# Patient Record
Sex: MALE | Race: WHITE | NOT HISPANIC OR LATINO | Employment: FULL TIME | URBAN - METROPOLITAN AREA
[De-identification: names, ages, dates, MRNs, and addresses within clinical notes are randomized per-mention and may not be internally consistent; named-entity substitution may affect disease eponyms.]

---

## 2018-08-22 ENCOUNTER — OFFICE VISIT (OUTPATIENT)
Dept: CARDIOLOGY | Facility: CLINIC | Age: 63
End: 2018-08-22
Attending: INTERNAL MEDICINE
Payer: COMMERCIAL

## 2018-08-22 VITALS
HEART RATE: 69 BPM | HEIGHT: 73 IN | SYSTOLIC BLOOD PRESSURE: 135 MMHG | DIASTOLIC BLOOD PRESSURE: 79 MMHG | WEIGHT: 212 LBS | BODY MASS INDEX: 28.1 KG/M2

## 2018-08-22 DIAGNOSIS — I25.10 CORONARY ARTERY DISEASE INVOLVING NATIVE CORONARY ARTERY OF NATIVE HEART WITHOUT ANGINA PECTORIS: ICD-10-CM

## 2018-08-22 DIAGNOSIS — M54.50 CHRONIC MIDLINE LOW BACK PAIN WITHOUT SCIATICA: ICD-10-CM

## 2018-08-22 DIAGNOSIS — G89.29 CHRONIC MIDLINE LOW BACK PAIN WITHOUT SCIATICA: ICD-10-CM

## 2018-08-22 DIAGNOSIS — Z87.898 HISTORY OF CHEST PAIN: ICD-10-CM

## 2018-08-22 DIAGNOSIS — I44.7 LEFT BUNDLE BRANCH BLOCK: ICD-10-CM

## 2018-08-22 DIAGNOSIS — E78.00 HYPERCHOLESTEROLEMIA: ICD-10-CM

## 2018-08-22 DIAGNOSIS — Z95.1 HISTORY OF CORONARY ARTERY BYPASS SURGERY: ICD-10-CM

## 2018-08-22 DIAGNOSIS — E66.3 OVERWEIGHT: ICD-10-CM

## 2018-08-22 DIAGNOSIS — I25.10 CORONARY ARTERY DISEASE DUE TO LIPID RICH PLAQUE: ICD-10-CM

## 2018-08-22 DIAGNOSIS — I25.83 CORONARY ARTERY DISEASE DUE TO LIPID RICH PLAQUE: ICD-10-CM

## 2018-08-22 PROCEDURE — 99214 OFFICE O/P EST MOD 30 MIN: CPT | Mod: 25,S$GLB,, | Performed by: INTERNAL MEDICINE

## 2018-08-22 PROCEDURE — 93000 ELECTROCARDIOGRAM COMPLETE: CPT | Mod: S$GLB,,, | Performed by: INTERNAL MEDICINE

## 2018-08-22 PROCEDURE — 3008F BODY MASS INDEX DOCD: CPT | Mod: CPTII,S$GLB,, | Performed by: INTERNAL MEDICINE

## 2018-08-22 RX ORDER — ASPIRIN 81 MG/1
81 TABLET ORAL DAILY
Qty: 90 TABLET | Refills: 3 | COMMUNITY
Start: 2018-08-22 | End: 2019-08-15 | Stop reason: SDUPTHER

## 2018-08-22 NOTE — PROGRESS NOTES
Subjective:     Richie Johnson is a 63 y.o. male with hypercholesterolemia. He is overweight. He has severe coronary artery disease and underwent coronary artery bypass grafting on 3/4/2013 receiving five grafts. He had some chest discomfort in 2016 and then had epigastric pain 6/2018. No exertional chest pain and no pain pain playing tennis. He may have had myalgia on statin in the past but appears to tolerate rosuvastatin well now. No exertional chest pain or exertional dyspnea. No palpitations or weak spells. Feeling well overall.      Coronary Artery Disease   Presents for follow-up visit. The disease course has been stable. Symptoms include chest pain. Pertinent negatives include no chest pressure, chest tightness, dizziness, leg swelling, muscle weakness, palpitations, shortness of breath or weight gain. Risk factors include hyperlipidemia. Risk factors do not include decreased physical activity, diabetes, hypertension or obesity. The symptoms have been stable.   Chest Pain    This is a recurrent problem. The current episode started more than 1 month ago. The problem has been resolved. The pain is at a severity of 2/10. The quality of the pain is described as burning. The pain radiates to the epigastrium. Associated symptoms include back pain. Pertinent negatives include no abdominal pain, claudication, cough, diaphoresis, dizziness, exertional chest pressure, fever, headaches, hemoptysis, irregular heartbeat, leg pain, lower extremity edema, malaise/fatigue, nausea, near-syncope, numbness, orthopnea, palpitations, PND, shortness of breath, sputum production, syncope, vomiting or weakness.   His past medical history is significant for CAD and hyperlipidemia.   Pertinent negatives for past medical history include no diabetes, no hypertension and no muscle weakness.   Hyperlipidemia   This is a chronic problem. The current episode started more than 1 year ago. The problem is controlled. Recent lipid tests were  reviewed and are normal. He has no history of chronic renal disease, diabetes, hypothyroidism, liver disease, obesity or nephrotic syndrome. Associated symptoms include chest pain. Pertinent negatives include no focal sensory loss, focal weakness, leg pain, myalgias or shortness of breath.       Review of Systems   Constitution: Negative for chills, diaphoresis, fever, weakness, malaise/fatigue and weight gain.   HENT: Negative for nosebleeds.    Eyes: Negative for blurred vision, double vision, pain, vision loss in left eye and vision loss in right eye.   Cardiovascular: Positive for chest pain. Negative for claudication, dyspnea on exertion, irregular heartbeat, leg swelling, near-syncope, orthopnea, palpitations, paroxysmal nocturnal dyspnea and syncope.   Respiratory: Negative for chest tightness, cough, hemoptysis, shortness of breath, sputum production and wheezing.    Endocrine: Negative for cold intolerance and heat intolerance.   Hematologic/Lymphatic: Negative for bleeding problem. Does not bruise/bleed easily.   Skin: Negative for color change, itching and rash.   Musculoskeletal: Positive for back pain. Negative for falls, muscle weakness and myalgias.   Gastrointestinal: Negative for abdominal pain, flatus, heartburn, hematemesis, hematochezia, hemorrhoids, jaundice, melena, nausea and vomiting.   Genitourinary: Negative for dysuria and hematuria.   Neurological: Negative for dizziness, focal weakness, headaches, light-headedness, loss of balance, numbness and vertigo.   Psychiatric/Behavioral: Negative for altered mental status, depression and memory loss. The patient is not nervous/anxious.    Allergic/Immunologic: Negative for hives and persistent infections.       Current Outpatient Medications on File Prior to Visit   Medication Sig Dispense Refill    aspirin (ECOTRIN) 81 MG EC tablet TAKE 1 TABLET BY MOUTH EVERY DAY 30 tablet 5    betamethasone dipropionate (DIPROLENE) 0.05 % cream as needed.    "3    CALCIUM CARBONATE/VITAMIN D3 (VITAMIN D-3 ORAL) Take 2,000 Int'l Units by mouth once daily.      CRESTOR 40 mg Tab Take 40 mg by mouth once daily.  6    hydrocodone-acetaminophen 5-325mg (NORCO) 5-325 mg per tablet Take 1 tablet by mouth every 6 (six) hours as needed for Pain. 40 tablet 0    ibuprofen (ADVIL,MOTRIN) 800 MG tablet Take 800 mg by mouth every 8 (eight) hours as needed.  0    metoprolol succinate (TOPROL-XL) 50 MG 24 hr tablet TAKE 1 TABLET BY MOUTH EVERY DAY 30 tablet 5    UBIDECARENONE (COQ-10 ORAL) Take 200 mg by mouth once daily.      ZETIA 10 mg tablet Take 10 mg by mouth once daily.  6     No current facility-administered medications on file prior to visit.        /79   Pulse 69   Ht 6' 1" (1.854 m)   Wt 96.2 kg (212 lb)   BMI 27.97 kg/m²       Objective:     Physical Exam   Constitutional: He is oriented to person, place, and time. He appears well-developed and well-nourished. No distress.   HENT:   Head: Normocephalic and atraumatic.   Eyes: Conjunctivae are normal. Right eye exhibits no discharge. Left eye exhibits no discharge. No scleral icterus.   Neck: Neck supple. No JVD present. Carotid bruit is not present. No thyromegaly present.   Cardiovascular: Normal rate, regular rhythm, S1 normal and S2 normal. Exam reveals gallop and S4. Exam reveals no S3.   No murmur heard.  Pulses:       Radial pulses are 2+ on the right side, and 2+ on the left side.        Dorsalis pedis pulses are 2+ on the right side, and 2+ on the left side.        Posterior tibial pulses are 2+ on the right side, and 2+ on the left side.   Pulmonary/Chest: Effort normal and breath sounds normal.   Abdominal: Soft. Normal appearance. There is no tenderness.   Musculoskeletal:        Right ankle: He exhibits no swelling, no ecchymosis and no deformity.        Left ankle: He exhibits no swelling, no ecchymosis and no deformity.   Lymphadenopathy:        Head (right side): No submandibular adenopathy " present.        Head (left side): No submandibular adenopathy present.     He has no cervical adenopathy.   Neurological: He is alert and oriented to person, place, and time.   Skin: Skin is warm, dry and intact. No rash noted. He is not diaphoretic.   Psychiatric: He has a normal mood and affect. His speech is normal and behavior is normal. Judgment and thought content normal. Cognition and memory are normal.       Assessment:     1. Coronary artery disease involving native coronary artery of native heart without angina pectoris    2. History of coronary artery bypass surgery    3. History of chest pain    4. Left bundle branch block    5. Hypercholesterolemia    6. Overweight    7. Chronic midline low back pain without sciatica        Plan:      1. Coronary Artery Disease   2/28/2013: [facility]: Cath: 3V CAD.    3/4/2013: [facility]: CABG x 5: LIMA to LAD, SVG1 to D1, SVG2 to RI & OM1 and SVG3 to RCA.    On aspirin 81 mg Q24.   Appears stable.    2. History of Chest Pain   8/15/2016: Experienced chest pain.   8/30/2016: ECG: New LBBB.   10/20/2016: Echo: Normal left ventricular size and systolic function. Mild LVH. Moderate diastolic dysfunction. Mildly dilated LA.   10/20/2016: Regadenoson MPI: Small moderately dense fixed inferoapical defect.   Appeared atypical.   6/2018: Episode of epigastric pain.   Appears atypical.   Do Echo.    3. Left Bundle Branch Block   8/30/2016: Diagnosed.   8/22/2018: ECG: QRS narrow.   Intermittent left bundle branch block.    4. Blood Pressure   8/22/2018: 135/79 mmHg.   Keep log at home.    5. Hypercholesterolemia   5/16/2013: Chol 209. HDL 43. . .   Was on atorvastatin 20 mg Q24 - unable to tolerate 40 mg Q24.   On rosuvastatin 40 mg Q24 and ezetimide 10 mg Q24.   12/10/2014: Chol 172. HDL 46. LDL 87. .   9/24/2015: Chol 148. HDL 50. LDL 78. TG 99.   On rosuvastatin 40 mg Q24 and ezetimide 10 mg Q24.   Well controlled    6. Overweight   8/22/2018: Weight 96 kg. BMI  28.   Encouraged to lose weight.    7. Back pain   2012: Back Surgery.    8. Primary Care   Dr. Patricia Barrett.    F/u 2 months.    Tiffanie Hawk M.D.

## 2018-10-24 ENCOUNTER — OFFICE VISIT (OUTPATIENT)
Dept: CARDIOLOGY | Facility: CLINIC | Age: 63
End: 2018-10-24
Attending: INTERNAL MEDICINE
Payer: COMMERCIAL

## 2018-10-24 VITALS
HEIGHT: 73 IN | SYSTOLIC BLOOD PRESSURE: 123 MMHG | WEIGHT: 205 LBS | HEART RATE: 56 BPM | BODY MASS INDEX: 27.17 KG/M2 | DIASTOLIC BLOOD PRESSURE: 56 MMHG

## 2018-10-24 DIAGNOSIS — E78.00 HYPERCHOLESTEROLEMIA: ICD-10-CM

## 2018-10-24 DIAGNOSIS — I25.10 CORONARY ARTERY DISEASE INVOLVING NATIVE CORONARY ARTERY OF NATIVE HEART WITHOUT ANGINA PECTORIS: ICD-10-CM

## 2018-10-24 DIAGNOSIS — Z87.898 HISTORY OF CHEST PAIN: ICD-10-CM

## 2018-10-24 DIAGNOSIS — I44.7 LEFT BUNDLE BRANCH BLOCK: ICD-10-CM

## 2018-10-24 DIAGNOSIS — Z95.1 HISTORY OF CORONARY ARTERY BYPASS SURGERY: ICD-10-CM

## 2018-10-24 DIAGNOSIS — E66.3 OVERWEIGHT: ICD-10-CM

## 2018-10-24 PROCEDURE — 99214 OFFICE O/P EST MOD 30 MIN: CPT | Mod: S$GLB,,, | Performed by: INTERNAL MEDICINE

## 2018-10-24 PROCEDURE — 3008F BODY MASS INDEX DOCD: CPT | Mod: CPTII,S$GLB,, | Performed by: INTERNAL MEDICINE

## 2018-10-24 NOTE — PROGRESS NOTES
Subjective:     Richie Johnson is a 63 y.o. male with hypercholesterolemia. He is overweight. He has severe coronary artery disease and underwent coronary artery bypass grafting on 3/4/2013 receiving five grafts. He had some chest discomfort in 2016 and then had epigastric pain 6/2018. He has intermittent left bundle branch block. No exertional chest pain and no pain pain playing tennis. He may have had myalgia on statin in the past but appears to tolerate rosuvastatin well now. No exertional chest pain or exertional dyspnea. No palpitations or weak spells. Feeling well overall.      Coronary Artery Disease   Presents for follow-up visit. The disease course has been stable. Pertinent negatives include no chest pain, chest pressure, chest tightness, dizziness, leg swelling, muscle weakness, palpitations, shortness of breath or weight gain. Risk factors include hyperlipidemia. Risk factors do not include decreased physical activity, diabetes, hypertension or obesity. The symptoms have been stable.   Chest Pain    This is a recurrent problem. The current episode started more than 1 month ago. The problem has been resolved. The pain is at a severity of 2/10. The quality of the pain is described as burning. The pain radiates to the epigastrium. Associated symptoms include back pain. Pertinent negatives include no abdominal pain, claudication, cough, diaphoresis, dizziness, exertional chest pressure, fever, headaches, hemoptysis, irregular heartbeat, leg pain, lower extremity edema, malaise/fatigue, nausea, near-syncope, numbness, orthopnea, palpitations, PND, shortness of breath, sputum production, syncope, vomiting or weakness.   His past medical history is significant for CAD and hyperlipidemia.   Pertinent negatives for past medical history include no diabetes, no hypertension and no muscle weakness.   Hyperlipidemia   This is a chronic problem. The current episode started more than 1 year ago. The problem is  controlled. Recent lipid tests were reviewed and are normal. He has no history of chronic renal disease, diabetes, hypothyroidism, liver disease, obesity or nephrotic syndrome. Pertinent negatives include no chest pain, focal sensory loss, focal weakness, leg pain, myalgias or shortness of breath.       Review of Systems   Constitution: Negative for chills, diaphoresis, fever, weakness, malaise/fatigue and weight gain.   HENT: Negative for nosebleeds.    Eyes: Negative for blurred vision, double vision, pain, vision loss in left eye and vision loss in right eye.   Cardiovascular: Negative for chest pain, claudication, dyspnea on exertion, irregular heartbeat, leg swelling, near-syncope, orthopnea, palpitations, paroxysmal nocturnal dyspnea and syncope.   Respiratory: Negative for chest tightness, cough, hemoptysis, shortness of breath, sputum production and wheezing.    Endocrine: Negative for cold intolerance and heat intolerance.   Hematologic/Lymphatic: Negative for bleeding problem. Does not bruise/bleed easily.   Skin: Negative for color change, itching and rash.   Musculoskeletal: Positive for back pain. Negative for falls, muscle weakness and myalgias.   Gastrointestinal: Negative for abdominal pain, flatus, heartburn, hematemesis, hematochezia, hemorrhoids, jaundice, melena, nausea and vomiting.   Genitourinary: Negative for dysuria and hematuria.   Neurological: Negative for dizziness, focal weakness, headaches, light-headedness, loss of balance, numbness and vertigo.   Psychiatric/Behavioral: Negative for altered mental status, depression and memory loss. The patient is not nervous/anxious.    Allergic/Immunologic: Negative for hives and persistent infections.       Current Outpatient Medications on File Prior to Visit   Medication Sig Dispense Refill    aspirin (ECOTRIN) 81 MG EC tablet Take 1 tablet (81 mg total) by mouth once daily. 90 tablet 3    betamethasone dipropionate (DIPROLENE) 0.05 % cream as  "needed.   3    CALCIUM CARBONATE/VITAMIN D3 (VITAMIN D-3 ORAL) Take 2,000 Int'l Units by mouth once daily.      CRESTOR 40 mg Tab Take 40 mg by mouth once daily.  6    hydrocodone-acetaminophen 5-325mg (NORCO) 5-325 mg per tablet Take 1 tablet by mouth every 6 (six) hours as needed for Pain. 40 tablet 0    ibuprofen (ADVIL,MOTRIN) 800 MG tablet Take 800 mg by mouth every 8 (eight) hours as needed.  0    UBIDECARENONE (COQ-10 ORAL) Take 200 mg by mouth once daily.      ZETIA 10 mg tablet Take 10 mg by mouth once daily.  6     No current facility-administered medications on file prior to visit.        BP (!) 123/56   Pulse (!) 56   Ht 6' 1" (1.854 m)   Wt 93 kg (205 lb)   BMI 27.05 kg/m²       Objective:     Physical Exam   Constitutional: He is oriented to person, place, and time. He appears well-developed and well-nourished. No distress.   HENT:   Head: Normocephalic and atraumatic.   Eyes: Conjunctivae are normal. Right eye exhibits no discharge. Left eye exhibits no discharge. No scleral icterus.   Neck: Neck supple. No JVD present. Carotid bruit is not present. No thyromegaly present.   Cardiovascular: Normal rate, regular rhythm, S1 normal and S2 normal. Exam reveals gallop and S4. Exam reveals no S3.   No murmur heard.  Pulses:       Radial pulses are 2+ on the right side, and 2+ on the left side.        Dorsalis pedis pulses are 2+ on the right side, and 2+ on the left side.        Posterior tibial pulses are 2+ on the right side, and 2+ on the left side.   Pulmonary/Chest: Effort normal and breath sounds normal.   Abdominal: Soft. Normal appearance. There is no tenderness.   Musculoskeletal:        Right ankle: He exhibits no swelling, no ecchymosis and no deformity.        Left ankle: He exhibits no swelling, no ecchymosis and no deformity.   Lymphadenopathy:        Head (right side): No submandibular adenopathy present.        Head (left side): No submandibular adenopathy present.     He has no " cervical adenopathy.   Neurological: He is alert and oriented to person, place, and time.   Skin: Skin is warm, dry and intact. No rash noted. He is not diaphoretic.   Psychiatric: He has a normal mood and affect. His speech is normal and behavior is normal. Judgment and thought content normal. Cognition and memory are normal.       Assessment:     1. Coronary artery disease involving native coronary artery of native heart without angina pectoris    2. History of coronary artery bypass surgery    3. History of chest pain    4. Left bundle branch block    5. Hypercholesterolemia    6. Overweight        Plan:      1. Coronary Artery Disease   2/28/2013: [facility]: Cath: 3V CAD.    3/4/2013: [facility]: CABG x 5: LIMA to LAD, SVG1 to D1, SVG2 to RI & OM1 and SVG3 to RCA.    On aspirin 81 mg Q24.   Appears stable.    2. History of Chest Pain   8/15/2016: Experienced chest pain.   8/30/2016: ECG: New LBBB.   10/20/2016: Echo: Normal left ventricular size and systolic function. Mild LVH. Moderate diastolic dysfunction. Mildly dilated LA.   10/20/2016: Regadenoson MPI: Small moderately dense fixed inferoapical defect.   Appeared atypical.   6/2018: Episode of epigastric pain.   Appeared atypical.   Resolved.   Do Echo.    3. Left Bundle Branch Block   8/30/2016: Diagnosed.   8/22/2018: ECG: QRS narrow.   Intermittent left bundle branch block.    4. Blood Pressure   8/22/2018: 135/79 mmHg.   Encouraged to keep log at home.    5. Hypercholesterolemia   5/16/2013: Chol 209. HDL 43. . .   Was on atorvastatin 20 mg Q24 - unable to tolerate 40 mg Q24.   On rosuvastatin 40 mg Q24 and ezetimide 10 mg Q24.   12/10/2014: Chol 172. HDL 46. LDL 87. .   9/24/2015: Chol 148. HDL 50. LDL 78. TG 99.   9/20/2018: Chol 124. HDL 48. LDL 65. TG 54.   On rosuvastatin 40 mg Q24 and ezetimide 10 mg Q24.   Well controlled    6. Overweight   8/22/2018: Weight 96 kg. BMI 28.   Encouraged to lose weight.    7. Back pain   2012: Back  Surgery.    8. Primary Care   Dr. Patricia Barrett.    F/u 6 months.    Tiffanie Hawk M.D.      10/30/2018 6:28 PM, Addendum:    10/26/2018: Echo: Normal left ventricular size and systolic function. Mild LVH. Mildly dilated LA.    I discussed the above test result and the implications of the findings over the phone.    Tiffanie Hawk M.D.

## 2018-10-26 ENCOUNTER — CLINICAL SUPPORT (OUTPATIENT)
Dept: CARDIOLOGY | Facility: CLINIC | Age: 63
End: 2018-10-26
Attending: INTERNAL MEDICINE
Payer: COMMERCIAL

## 2018-10-26 DIAGNOSIS — I25.10 CORONARY ARTERY DISEASE INVOLVING NATIVE CORONARY ARTERY OF NATIVE HEART WITHOUT ANGINA PECTORIS: ICD-10-CM

## 2018-10-26 DIAGNOSIS — I44.7 LEFT BUNDLE BRANCH BLOCK: ICD-10-CM

## 2018-10-26 DIAGNOSIS — Z87.898 HISTORY OF CHEST PAIN: ICD-10-CM

## 2018-10-26 DIAGNOSIS — Z95.1 HISTORY OF CORONARY ARTERY BYPASS SURGERY: ICD-10-CM

## 2018-10-26 PROCEDURE — 93306 TTE W/DOPPLER COMPLETE: CPT | Mod: S$GLB,,, | Performed by: INTERNAL MEDICINE

## 2018-10-26 NOTE — PROGRESS NOTES
Echo done   Lupe Harrisonier  is not active on [facility]. Please contact pt with results and offer to mail a copy of results letter. Labs show normal blood counts, normal kidney testing, normal electrolytes, normal liver markers, normal fasting blood sugar. Cholesterol levels show normal LDL \"bad\" cholesterol and slightly low HDL \"good\" cholesterol. This is similar to previous check. Your triglyceride level however is significantly improved and is now in the normal range. Vitamin D level was low at 24.7, weekly high dose supplement is recommended. Thyroid testing was normal.  DUYEN was negative. Hemoglobin A1C was stable at 5.8%, in early prediabetic range.     Urine screening is normal

## 2019-04-24 ENCOUNTER — OFFICE VISIT (OUTPATIENT)
Dept: CARDIOLOGY | Facility: CLINIC | Age: 64
End: 2019-04-24
Attending: INTERNAL MEDICINE
Payer: COMMERCIAL

## 2019-04-24 VITALS
WEIGHT: 217 LBS | SYSTOLIC BLOOD PRESSURE: 159 MMHG | HEIGHT: 73 IN | HEART RATE: 56 BPM | BODY MASS INDEX: 28.76 KG/M2 | DIASTOLIC BLOOD PRESSURE: 86 MMHG

## 2019-04-24 DIAGNOSIS — Z95.1 HISTORY OF CORONARY ARTERY BYPASS SURGERY: ICD-10-CM

## 2019-04-24 DIAGNOSIS — E78.00 HYPERCHOLESTEROLEMIA: ICD-10-CM

## 2019-04-24 DIAGNOSIS — E66.3 OVERWEIGHT: ICD-10-CM

## 2019-04-24 DIAGNOSIS — M54.50 CHRONIC MIDLINE LOW BACK PAIN WITHOUT SCIATICA: ICD-10-CM

## 2019-04-24 DIAGNOSIS — G89.29 CHRONIC MIDLINE LOW BACK PAIN WITHOUT SCIATICA: ICD-10-CM

## 2019-04-24 DIAGNOSIS — I25.10 CORONARY ARTERY DISEASE INVOLVING NATIVE CORONARY ARTERY OF NATIVE HEART WITHOUT ANGINA PECTORIS: ICD-10-CM

## 2019-04-24 DIAGNOSIS — Z87.898 HISTORY OF CHEST PAIN: ICD-10-CM

## 2019-04-24 DIAGNOSIS — I44.7 LEFT BUNDLE BRANCH BLOCK: ICD-10-CM

## 2019-04-24 PROCEDURE — 3008F PR BODY MASS INDEX (BMI) DOCUMENTED: ICD-10-PCS | Mod: CPTII,S$GLB,, | Performed by: INTERNAL MEDICINE

## 2019-04-24 PROCEDURE — 3008F BODY MASS INDEX DOCD: CPT | Mod: CPTII,S$GLB,, | Performed by: INTERNAL MEDICINE

## 2019-04-24 PROCEDURE — 99214 OFFICE O/P EST MOD 30 MIN: CPT | Mod: S$GLB,,, | Performed by: INTERNAL MEDICINE

## 2019-04-24 PROCEDURE — 99214 PR OFFICE/OUTPT VISIT, EST, LEVL IV, 30-39 MIN: ICD-10-PCS | Mod: S$GLB,,, | Performed by: INTERNAL MEDICINE

## 2019-04-24 NOTE — PROGRESS NOTES
Subjective:     Richie Johnson is a 63 y.o. male with hypercholesterolemia. He is overweight. He has severe coronary artery disease and underwent coronary artery bypass grafting on 3/4/2013 receiving five grafts. He had some chest discomfort in 2016 and then had epigastric pain 6/2018. He has intermittent left bundle branch block. No exertional chest pain and no pain pain playing tennis. He may have had myalgia on statin in the past but appears to tolerate rosuvastatin well now. No exertional chest pain or exertional dyspnea. No palpitations or weak spells. Feeling well overall.      Coronary Artery Disease   Presents for follow-up visit. The disease course has been stable. Pertinent negatives include no chest pain, chest pressure, chest tightness, dizziness, leg swelling, muscle weakness, palpitations, shortness of breath or weight gain. Risk factors include hyperlipidemia. Risk factors do not include decreased physical activity, diabetes, hypertension or obesity. The symptoms have been stable.   Chest Pain    This is a recurrent problem. The current episode started more than 1 month ago. The problem has been resolved. The pain is at a severity of 2/10. The quality of the pain is described as burning. The pain radiates to the epigastrium. Associated symptoms include back pain. Pertinent negatives include no abdominal pain, claudication, cough, diaphoresis, dizziness, exertional chest pressure, fever, headaches, hemoptysis, irregular heartbeat, leg pain, lower extremity edema, malaise/fatigue, nausea, near-syncope, numbness, orthopnea, palpitations, PND, shortness of breath, sputum production, syncope, vomiting or weakness.   His past medical history is significant for CAD and hyperlipidemia.   Pertinent negatives for past medical history include no diabetes, no hypertension and no muscle weakness.   Hyperlipidemia   This is a chronic problem. The current episode started more than 1 year ago. The problem is  controlled. Recent lipid tests were reviewed and are normal. He has no history of chronic renal disease, diabetes, hypothyroidism, liver disease, obesity or nephrotic syndrome. Pertinent negatives include no chest pain, focal sensory loss, focal weakness, leg pain, myalgias or shortness of breath.       Review of Systems   Constitution: Negative for chills, diaphoresis, fever, malaise/fatigue and weight gain.   HENT: Negative for nosebleeds.    Eyes: Negative for blurred vision, double vision, pain, vision loss in left eye and vision loss in right eye.   Cardiovascular: Negative for chest pain, claudication, dyspnea on exertion, irregular heartbeat, leg swelling, near-syncope, orthopnea, palpitations, paroxysmal nocturnal dyspnea and syncope.   Respiratory: Negative for chest tightness, cough, hemoptysis, shortness of breath, sputum production and wheezing.    Endocrine: Negative for cold intolerance and heat intolerance.   Hematologic/Lymphatic: Negative for bleeding problem. Does not bruise/bleed easily.   Skin: Negative for color change, itching and rash.   Musculoskeletal: Positive for back pain. Negative for falls, muscle weakness and myalgias.   Gastrointestinal: Negative for abdominal pain, flatus, heartburn, hematemesis, hematochezia, hemorrhoids, jaundice, melena, nausea and vomiting.   Genitourinary: Negative for dysuria and hematuria.   Neurological: Negative for dizziness, focal weakness, headaches, light-headedness, loss of balance, numbness, tremors, vertigo and weakness.   Psychiatric/Behavioral: Negative for altered mental status, depression and memory loss. The patient is not nervous/anxious.    Allergic/Immunologic: Negative for hives and persistent infections.       Current Outpatient Medications on File Prior to Visit   Medication Sig Dispense Refill    aspirin (ECOTRIN) 81 MG EC tablet Take 1 tablet (81 mg total) by mouth once daily. 90 tablet 3    betamethasone dipropionate (DIPROLENE) 0.05 %  "cream as needed.   3    CALCIUM CARBONATE/VITAMIN D3 (VITAMIN D-3 ORAL) Take 2,000 Int'l Units by mouth once daily.      CRESTOR 40 mg Tab Take 40 mg by mouth once daily.  6    hydrocodone-acetaminophen 5-325mg (NORCO) 5-325 mg per tablet Take 1 tablet by mouth every 6 (six) hours as needed for Pain. 40 tablet 0    ibuprofen (ADVIL,MOTRIN) 800 MG tablet Take 800 mg by mouth every 8 (eight) hours as needed.  0    UBIDECARENONE (COQ-10 ORAL) Take 200 mg by mouth once daily.      ZETIA 10 mg tablet Take 10 mg by mouth once daily.  6     No current facility-administered medications on file prior to visit.        BP (!) 159/86   Pulse (!) 56   Ht 6' 1" (1.854 m)   Wt 98.4 kg (217 lb)   BMI 28.63 kg/m²       Objective:     Physical Exam   Constitutional: He is oriented to person, place, and time. He appears well-developed and well-nourished. No distress.   HENT:   Head: Normocephalic and atraumatic.   Eyes: Conjunctivae are normal. Right eye exhibits no discharge. Left eye exhibits no discharge. No scleral icterus.   Neck: Neck supple. No JVD present. Carotid bruit is not present. No thyromegaly present.   Cardiovascular: Normal rate, regular rhythm, S1 normal and S2 normal. Exam reveals gallop and S4. Exam reveals no S3.   No murmur heard.  Pulses:       Radial pulses are 2+ on the right side, and 2+ on the left side.        Dorsalis pedis pulses are 2+ on the right side, and 2+ on the left side.        Posterior tibial pulses are 2+ on the right side, and 2+ on the left side.   Pulmonary/Chest: Effort normal and breath sounds normal.   Median sternotomy scar.   Abdominal: Soft. Normal appearance. There is no tenderness.   Musculoskeletal:        Right ankle: He exhibits no swelling, no ecchymosis and no deformity.        Left ankle: He exhibits no swelling, no ecchymosis and no deformity.   Lymphadenopathy:        Head (right side): No submandibular adenopathy present.        Head (left side): No submandibular " adenopathy present.     He has no cervical adenopathy.   Neurological: He is alert and oriented to person, place, and time.   Skin: Skin is warm, dry and intact. No rash noted. He is not diaphoretic.   Psychiatric: He has a normal mood and affect. His speech is normal and behavior is normal. Judgment and thought content normal. Cognition and memory are normal.       Assessment:     1. Coronary artery disease involving native coronary artery of native heart without angina pectoris    2. History of coronary artery bypass surgery    3. History of chest pain    4. Left bundle branch block    5. Hypercholesterolemia    6. Overweight    7. Chronic midline low back pain without sciatica        Plan:      1. Coronary Artery Disease   2/28/2013: [facility]: Cath: 3V CAD.    3/4/2013: [facility]: CABG x 5: LIMA to LAD, SVG1 to D1, SVG2 to RI & OM1 and SVG3 to RCA.    On aspirin 81 mg Q24.   Appears stable.    2. History of Chest Pain   8/15/2016: Experienced chest pain.   8/30/2016: ECG: New LBBB.   10/20/2016: Echo: Normal left ventricular size and systolic function. Mild LVH. Moderate diastolic dysfunction. Mildly dilated LA.   10/20/2016: Regadenoson MPI: Small moderately dense fixed inferoapical defect.   10/26/2018: Echo: Normal left ventricular size and systolic function. Mild LVH. Mildly dilated LA.   6/2018: Episode of epigastric pain.   Appeared atypical.   Resolved.     3. Left Bundle Branch Block   8/30/2016: Diagnosed.   8/22/2018: ECG: QRS narrow.   Intermittent left bundle branch block.    4. Blood Pressure   8/22/2018: 135/79 mmHg.   4/24/2019: 159/86 mmHg.   Needs to keep log at home.    5. Hypercholesterolemia   5/16/2013: Chol 209. HDL 43. . .   Was on atorvastatin 20 mg Q24 - unable to tolerate 40 mg Q24.   On rosuvastatin 40 mg Q24 and ezetimide 10 mg Q24.   12/10/2014: Chol 172. HDL 46. LDL 87. .   9/24/2015: Chol 148. HDL 50. LDL 78. TG 99.   9/20/2018: Chol 124. HDL 48. LDL 65. TG 54.   On  rosuvastatin 40 mg Q24.   Well controlled    6. Overweight   8/22/2018: Weight 96 kg. BMI 28.   Encouraged to lose weight.    7. Back pain   2012: Back Surgery.    8. Primary Care   Dr. Patricia Barrett.    F/u 3 months.    Tiffanie Hawk M.D.

## 2019-04-28 ENCOUNTER — PATIENT MESSAGE (OUTPATIENT)
Dept: CARDIOLOGY | Facility: CLINIC | Age: 64
End: 2019-04-28

## 2019-08-15 ENCOUNTER — OFFICE VISIT (OUTPATIENT)
Dept: CARDIOLOGY | Facility: CLINIC | Age: 64
End: 2019-08-15
Attending: INTERNAL MEDICINE
Payer: COMMERCIAL

## 2019-08-15 VITALS
WEIGHT: 217 LBS | HEIGHT: 73 IN | DIASTOLIC BLOOD PRESSURE: 87 MMHG | SYSTOLIC BLOOD PRESSURE: 152 MMHG | HEART RATE: 59 BPM | BODY MASS INDEX: 28.76 KG/M2

## 2019-08-15 DIAGNOSIS — E78.00 HYPERCHOLESTEROLEMIA: ICD-10-CM

## 2019-08-15 DIAGNOSIS — E66.3 OVERWEIGHT: ICD-10-CM

## 2019-08-15 DIAGNOSIS — G89.29 CHRONIC MIDLINE LOW BACK PAIN WITHOUT SCIATICA: ICD-10-CM

## 2019-08-15 DIAGNOSIS — M54.50 CHRONIC MIDLINE LOW BACK PAIN WITHOUT SCIATICA: ICD-10-CM

## 2019-08-15 DIAGNOSIS — I25.10 CORONARY ARTERY DISEASE INVOLVING NATIVE CORONARY ARTERY OF NATIVE HEART WITHOUT ANGINA PECTORIS: ICD-10-CM

## 2019-08-15 DIAGNOSIS — Z95.1 HISTORY OF CORONARY ARTERY BYPASS SURGERY: ICD-10-CM

## 2019-08-15 DIAGNOSIS — I44.7 LEFT BUNDLE BRANCH BLOCK: ICD-10-CM

## 2019-08-15 DIAGNOSIS — Z87.898 HISTORY OF CHEST PAIN: ICD-10-CM

## 2019-08-15 PROCEDURE — 99214 OFFICE O/P EST MOD 30 MIN: CPT | Mod: S$GLB,,, | Performed by: INTERNAL MEDICINE

## 2019-08-15 PROCEDURE — 99214 PR OFFICE/OUTPT VISIT, EST, LEVL IV, 30-39 MIN: ICD-10-PCS | Mod: S$GLB,,, | Performed by: INTERNAL MEDICINE

## 2019-08-15 PROCEDURE — 3008F PR BODY MASS INDEX (BMI) DOCUMENTED: ICD-10-PCS | Mod: CPTII,S$GLB,, | Performed by: INTERNAL MEDICINE

## 2019-08-15 PROCEDURE — 3008F BODY MASS INDEX DOCD: CPT | Mod: CPTII,S$GLB,, | Performed by: INTERNAL MEDICINE

## 2019-08-15 RX ORDER — ROSUVASTATIN CALCIUM 40 MG/1
40 TABLET, COATED ORAL DAILY
Qty: 90 TABLET | Refills: 3 | Status: SHIPPED | OUTPATIENT
Start: 2019-08-15 | End: 2020-06-18 | Stop reason: SDUPTHER

## 2019-08-15 RX ORDER — ASPIRIN 81 MG/1
81 TABLET ORAL DAILY
Qty: 90 TABLET | Refills: 3 | COMMUNITY
Start: 2019-08-15 | End: 2020-06-18 | Stop reason: SDUPTHER

## 2019-08-15 NOTE — PROGRESS NOTES
Subjective:     Richie Johnson is a 64 y.o. male with hypercholesterolemia. He is overweight. He has severe coronary artery disease and underwent coronary artery bypass grafting on 3/4/2013 receiving five grafts. He had some chest discomfort in 2016 and then had epigastric pain 6/2018. He has intermittent left bundle branch block. No exertional chest pain and no pain pain playing tennis. He may have had myalgia on statin in the past but appears to tolerate rosuvastatin well now. No exertional chest pain or exertional dyspnea. No palpitations or weak spells. Feeling well overall.      Coronary Artery Disease   Presents for follow-up visit. The disease course has been stable. Symptoms include chest pain. Pertinent negatives include no chest pressure, chest tightness, dizziness, leg swelling, muscle weakness, palpitations, shortness of breath or weight gain. Risk factors include hyperlipidemia. Risk factors do not include decreased physical activity, diabetes, hypertension or obesity. The symptoms have been stable.   Chest Pain    This is a recurrent problem. The current episode started more than 1 month ago. The problem has been resolved. The pain is at a severity of 2/10. The quality of the pain is described as burning. The pain radiates to the epigastrium. Associated symptoms include back pain. Pertinent negatives include no abdominal pain, claudication, cough, diaphoresis, dizziness, exertional chest pressure, fever, headaches, hemoptysis, irregular heartbeat, leg pain, lower extremity edema, malaise/fatigue, nausea, near-syncope, numbness, orthopnea, palpitations, PND, shortness of breath, sputum production, syncope, vomiting or weakness.   His past medical history is significant for CAD and hyperlipidemia.   Pertinent negatives for past medical history include no diabetes, no hypertension and no muscle weakness.   Hyperlipidemia   This is a chronic problem. The current episode started more than 1 year ago. The  problem is controlled. Recent lipid tests were reviewed and are normal. He has no history of chronic renal disease, diabetes, hypothyroidism, liver disease, obesity or nephrotic syndrome. Associated symptoms include chest pain. Pertinent negatives include no focal sensory loss, focal weakness, leg pain, myalgias or shortness of breath.       Review of Systems   Constitution: Negative for chills, diaphoresis, fever, malaise/fatigue and weight gain.   HENT: Negative for nosebleeds.    Eyes: Negative for blurred vision, double vision, pain, vision loss in left eye and vision loss in right eye.   Cardiovascular: Positive for chest pain. Negative for claudication, dyspnea on exertion, irregular heartbeat, leg swelling, near-syncope, orthopnea, palpitations, paroxysmal nocturnal dyspnea and syncope.   Respiratory: Negative for chest tightness, cough, hemoptysis, shortness of breath, sputum production and wheezing.    Endocrine: Negative for cold intolerance and heat intolerance.   Hematologic/Lymphatic: Negative for bleeding problem. Does not bruise/bleed easily.   Skin: Negative for color change, itching and rash.   Musculoskeletal: Positive for back pain. Negative for falls, muscle weakness, myalgias and neck pain.   Gastrointestinal: Negative for abdominal pain, flatus, heartburn, hematemesis, hematochezia, hemorrhoids, jaundice, melena, nausea and vomiting.   Genitourinary: Negative for dysuria and hematuria.   Neurological: Negative for dizziness, focal weakness, headaches, light-headedness, loss of balance, numbness, sensory change, tremors, vertigo and weakness.   Psychiatric/Behavioral: Negative for altered mental status, depression and memory loss. The patient is not nervous/anxious.    Allergic/Immunologic: Negative for hives and persistent infections.       Current Outpatient Medications on File Prior to Visit   Medication Sig Dispense Refill    aspirin (ECOTRIN) 81 MG EC tablet Take 1 tablet (81 mg total) by  "mouth once daily. 90 tablet 3    betamethasone dipropionate (DIPROLENE) 0.05 % cream as needed.   3    CALCIUM CARBONATE/VITAMIN D3 (VITAMIN D-3 ORAL) Take 2,000 Int'l Units by mouth once daily.      CRESTOR 40 mg Tab Take 40 mg by mouth once daily.  6    hydrocodone-acetaminophen 5-325mg (NORCO) 5-325 mg per tablet Take 1 tablet by mouth every 6 (six) hours as needed for Pain. 40 tablet 0    ibuprofen (ADVIL,MOTRIN) 800 MG tablet Take 800 mg by mouth every 8 (eight) hours as needed.  0    UBIDECARENONE (COQ-10 ORAL) Take 200 mg by mouth once daily.      ZETIA 10 mg tablet Take 10 mg by mouth once daily.  6     No current facility-administered medications on file prior to visit.        BP (!) 152/87   Pulse (!) 59   Ht 6' 1" (1.854 m)   Wt 98.4 kg (217 lb)   BMI 28.63 kg/m²       Objective:     Physical Exam   Constitutional: He is oriented to person, place, and time. He appears well-developed and well-nourished. No distress.   HENT:   Head: Normocephalic and atraumatic.   Eyes: Conjunctivae are normal. Right eye exhibits no discharge. Left eye exhibits no discharge. No scleral icterus.   Neck: Neck supple. No JVD present. Carotid bruit is not present. No thyromegaly present.   Cardiovascular: Normal rate, regular rhythm, S1 normal and S2 normal. Exam reveals gallop and S4. Exam reveals no S3.   No murmur heard.  Pulses:       Radial pulses are 2+ on the right side, and 2+ on the left side.        Dorsalis pedis pulses are 2+ on the right side, and 2+ on the left side.        Posterior tibial pulses are 2+ on the right side, and 2+ on the left side.   Pulmonary/Chest: Effort normal and breath sounds normal.   Median sternotomy scar.   Abdominal: Soft. Normal appearance. There is no tenderness.   Musculoskeletal:        Right ankle: He exhibits no swelling, no ecchymosis and no deformity.        Left ankle: He exhibits no swelling, no ecchymosis and no deformity.   Lymphadenopathy:        Head (right " side): No submandibular adenopathy present.        Head (left side): No submandibular adenopathy present.     He has no cervical adenopathy.   Neurological: He is alert and oriented to person, place, and time.   Skin: Skin is warm, dry and intact. No rash noted. He is not diaphoretic.   Psychiatric: He has a normal mood and affect. His speech is normal and behavior is normal. Judgment and thought content normal. Cognition and memory are normal.       Assessment:     1. Coronary artery disease involving native coronary artery of native heart without angina pectoris    2. History of coronary artery bypass surgery    3. History of chest pain    4. Left bundle branch block    5. Hypercholesterolemia    6. Overweight    7. Chronic midline low back pain without sciatica        Plan:      1. Coronary Artery Disease   2/28/2013: [facility]: Cath: 3V CAD.    3/4/2013: [facility]: CABG x 5: LIMA to LAD, SVG1 to D1, SVG2 to RI & OM1 and SVG3 to RCA.    On aspirin 81 mg Q24.   Appears stable.    2. History of Chest Pain   8/15/2016: Experienced chest pain.   8/30/2016: ECG: New LBBB.   10/20/2016: Echo: Normal left ventricular size and systolic function. Mild LVH. Moderate diastolic dysfunction. Mildly dilated LA.   10/20/2016: Regadenoson MPI: Small moderately dense fixed inferoapical defect.   10/26/2018: Echo: Normal left ventricular size and systolic function. Mild LVH. Mildly dilated LA.   6/2018: Episode of epigastric pain.   Appeared atypical.   Resolved.     3. Left Bundle Branch Block   8/30/2016: Diagnosed.   8/22/2018: ECG: QRS narrow.   Intermittent left bundle branch block.    4. Blood Pressure   8/22/2018: 135/79 mmHg.   4/24/2019: 159/86 mmHg.   Keeping log at home. Readings 120-130/70-80 mmHg.   Well controlled.    5. Hypercholesterolemia   5/16/2013: Chol 209. HDL 43. . .   Was on atorvastatin 20 mg Q24 - unable to tolerate 40 mg Q24.   On rosuvastatin 40 mg Q24 and ezetimide 10 mg Q24.   12/10/2014: Chol  172. HDL 46. LDL 87. .   9/24/2015: Chol 148. HDL 50. LDL 78. TG 99.   9/20/2018: Chol 124. HDL 48. LDL 65. TG 54.   On rosuvastatin 40 mg Q24.   Well controlled    6. Overweight   8/22/2018: Weight 96 kg. BMI 28.   Encouraged to lose weight.    7. Back pain   2012: Back Surgery.    8. Primary Care   Dr. Patricia Barrett.    F/u 6 months.    Tiffanie Hawk M.D.

## 2019-10-14 ENCOUNTER — CLINICAL SUPPORT (OUTPATIENT)
Dept: OTHER | Facility: CLINIC | Age: 64
End: 2019-10-14
Payer: COMMERCIAL

## 2019-10-14 DIAGNOSIS — Z00.8 ENCOUNTER FOR OTHER GENERAL EXAMINATION: ICD-10-CM

## 2019-10-14 PROCEDURE — 80061 LIPID PANEL: CPT | Mod: QW,S$GLB,, | Performed by: INTERNAL MEDICINE

## 2019-10-14 PROCEDURE — 80061 PR  LIPID PANEL: ICD-10-PCS | Mod: QW,S$GLB,, | Performed by: INTERNAL MEDICINE

## 2019-10-14 PROCEDURE — 99401 PR PREVENT COUNSEL,INDIV,15 MIN: ICD-10-PCS | Mod: S$GLB,,, | Performed by: INTERNAL MEDICINE

## 2019-10-14 PROCEDURE — 82947 PR  ASSAY QUANTITATIVE,BLOOD GLUCOSE: ICD-10-PCS | Mod: QW,S$GLB,, | Performed by: INTERNAL MEDICINE

## 2019-10-14 PROCEDURE — 82947 ASSAY GLUCOSE BLOOD QUANT: CPT | Mod: QW,S$GLB,, | Performed by: INTERNAL MEDICINE

## 2019-10-14 PROCEDURE — 99401 PREV MED CNSL INDIV APPRX 15: CPT | Mod: S$GLB,,, | Performed by: INTERNAL MEDICINE

## 2019-10-15 VITALS — BODY MASS INDEX: 28.63 KG/M2 | HEIGHT: 73 IN

## 2019-10-15 LAB
GLUCOSE SERPL-MCNC: 129 MG/DL (ref 60–140)
HDLC SERPL-MCNC: 44 MG/DL
POC CHOLESTEROL, LDL (DOCK): 110 MG/DL
POC CHOLESTEROL, TOTAL: 204 MG/DL
TRIGL SERPL-MCNC: 253 MG/DL

## 2020-02-06 ENCOUNTER — OFFICE VISIT (OUTPATIENT)
Dept: CARDIOLOGY | Facility: CLINIC | Age: 65
End: 2020-02-06
Attending: INTERNAL MEDICINE
Payer: COMMERCIAL

## 2020-02-06 VITALS
WEIGHT: 220 LBS | HEART RATE: 57 BPM | BODY MASS INDEX: 29.16 KG/M2 | DIASTOLIC BLOOD PRESSURE: 80 MMHG | SYSTOLIC BLOOD PRESSURE: 153 MMHG | HEIGHT: 73 IN

## 2020-02-06 DIAGNOSIS — R07.2 PRECORDIAL PAIN: ICD-10-CM

## 2020-02-06 DIAGNOSIS — E66.3 OVERWEIGHT: ICD-10-CM

## 2020-02-06 DIAGNOSIS — M54.50 CHRONIC MIDLINE LOW BACK PAIN WITHOUT SCIATICA: ICD-10-CM

## 2020-02-06 DIAGNOSIS — Z95.1 HISTORY OF CORONARY ARTERY BYPASS SURGERY: ICD-10-CM

## 2020-02-06 DIAGNOSIS — Z91.89 AT RISK FOR CORONARY ARTERY DISEASE: ICD-10-CM

## 2020-02-06 DIAGNOSIS — G89.29 CHRONIC MIDLINE LOW BACK PAIN WITHOUT SCIATICA: ICD-10-CM

## 2020-02-06 DIAGNOSIS — I25.119 CORONARY ARTERY DISEASE INVOLVING NATIVE CORONARY ARTERY OF NATIVE HEART WITH ANGINA PECTORIS: Primary | ICD-10-CM

## 2020-02-06 DIAGNOSIS — I44.7 LEFT BUNDLE BRANCH BLOCK: ICD-10-CM

## 2020-02-06 DIAGNOSIS — I25.119 CORONARY ARTERY DISEASE INVOLVING NATIVE CORONARY ARTERY OF NATIVE HEART WITH ANGINA PECTORIS: ICD-10-CM

## 2020-02-06 DIAGNOSIS — E78.00 HYPERCHOLESTEROLEMIA: ICD-10-CM

## 2020-02-06 PROCEDURE — 3008F PR BODY MASS INDEX (BMI) DOCUMENTED: ICD-10-PCS | Mod: CPTII,S$GLB,, | Performed by: INTERNAL MEDICINE

## 2020-02-06 PROCEDURE — 93000 PR ELECTROCARDIOGRAM, COMPLETE: ICD-10-PCS | Mod: S$GLB,,, | Performed by: INTERNAL MEDICINE

## 2020-02-06 PROCEDURE — 99214 OFFICE O/P EST MOD 30 MIN: CPT | Mod: 25,S$GLB,, | Performed by: INTERNAL MEDICINE

## 2020-02-06 PROCEDURE — 3008F BODY MASS INDEX DOCD: CPT | Mod: CPTII,S$GLB,, | Performed by: INTERNAL MEDICINE

## 2020-02-06 PROCEDURE — 99214 PR OFFICE/OUTPT VISIT, EST, LEVL IV, 30-39 MIN: ICD-10-PCS | Mod: 25,S$GLB,, | Performed by: INTERNAL MEDICINE

## 2020-02-06 PROCEDURE — 93000 ELECTROCARDIOGRAM COMPLETE: CPT | Mod: S$GLB,,, | Performed by: INTERNAL MEDICINE

## 2020-02-06 RX ORDER — PANTOPRAZOLE SODIUM 40 MG/1
40 TABLET, DELAYED RELEASE ORAL DAILY
Qty: 30 TABLET | Refills: 11 | Status: SHIPPED | OUTPATIENT
Start: 2020-02-06 | End: 2020-06-18

## 2020-02-06 RX ORDER — NITROGLYCERIN 0.4 MG/1
0.4 TABLET SUBLINGUAL EVERY 5 MIN PRN
Qty: 30 TABLET | Refills: 11 | Status: SHIPPED | OUTPATIENT
Start: 2020-02-06 | End: 2022-01-13 | Stop reason: SDUPTHER

## 2020-02-06 RX ORDER — EZETIMIBE 10 MG/1
10 TABLET ORAL DAILY
COMMUNITY
End: 2020-06-18 | Stop reason: SDUPTHER

## 2020-02-06 NOTE — PROGRESS NOTES
Subjective:     Richie Johnson is a 64 y.o. male with hypercholesterolemia. He is overweight. He has severe coronary artery disease and underwent coronary artery bypass grafting on 3/4/2013 receiving five grafts. He had some chest discomfort in 2016 and then had epigastric pain 6/2018. He has intermittent left bundle branch block. In 11/2019 he began experience epigastric discomfort and left arm pain. Not clear exertional chest pain and no clear association with playing tennis. He may have had myalgia on statin in the past but appears to tolerate rosuvastatin well now. No exertional dyspnea. No palpitations or weak spells. Feeling well overall.      Coronary Artery Disease   Presents for follow-up visit. The disease course has been stable. Symptoms include chest pain. Pertinent negatives include no chest pressure, chest tightness, dizziness, leg swelling, muscle weakness, palpitations, shortness of breath or weight gain. Risk factors include hyperlipidemia. Risk factors do not include decreased physical activity, diabetes, hypertension or obesity. The symptoms have been stable.   Chest Pain    This is a recurrent problem. The current episode started more than 1 month ago. The problem has been resolved. The pain is at a severity of 2/10. The quality of the pain is described as burning. The pain radiates to the epigastrium. Associated symptoms include back pain. Pertinent negatives include no abdominal pain, claudication, cough, diaphoresis, dizziness, exertional chest pressure, fever, headaches, hemoptysis, irregular heartbeat, leg pain, lower extremity edema, malaise/fatigue, nausea, near-syncope, numbness, orthopnea, palpitations, PND, shortness of breath, sputum production, syncope, vomiting or weakness.   His past medical history is significant for CAD and hyperlipidemia.   Pertinent negatives for past medical history include no diabetes, no hypertension and no muscle weakness.   Hyperlipidemia   This is a  chronic problem. The current episode started more than 1 year ago. The problem is controlled. Recent lipid tests were reviewed and are normal. He has no history of chronic renal disease, diabetes, hypothyroidism, liver disease, obesity or nephrotic syndrome. Associated symptoms include chest pain. Pertinent negatives include no focal sensory loss, focal weakness, leg pain, myalgias or shortness of breath.       Review of Systems   Constitution: Negative for chills, diaphoresis, fever, malaise/fatigue and weight gain.   HENT: Negative for nosebleeds.    Eyes: Negative for blurred vision, double vision, pain, vision loss in left eye and vision loss in right eye.   Cardiovascular: Positive for chest pain. Negative for claudication, dyspnea on exertion, irregular heartbeat, leg swelling, near-syncope, orthopnea, palpitations, paroxysmal nocturnal dyspnea and syncope.   Respiratory: Negative for chest tightness, cough, hemoptysis, shortness of breath, sputum production and wheezing.    Endocrine: Negative for cold intolerance and heat intolerance.   Hematologic/Lymphatic: Negative for bleeding problem. Does not bruise/bleed easily.   Skin: Negative for color change, itching and rash.   Musculoskeletal: Positive for back pain. Negative for falls, muscle weakness, myalgias and neck pain.   Gastrointestinal: Negative for abdominal pain, flatus, heartburn, hematemesis, hematochezia, hemorrhoids, jaundice, melena, nausea and vomiting.   Genitourinary: Negative for dysuria and hematuria.   Neurological: Negative for dizziness, focal weakness, headaches, light-headedness, loss of balance, numbness, sensory change, tremors, vertigo and weakness.   Psychiatric/Behavioral: Negative for altered mental status, depression and memory loss. The patient is not nervous/anxious.    Allergic/Immunologic: Negative for hives and persistent infections.       Current Outpatient Medications on File Prior to Visit   Medication Sig Dispense  "Refill    aspirin (ECOTRIN) 81 MG EC tablet Take 1 tablet (81 mg total) by mouth once daily. 90 tablet 3    betamethasone dipropionate (DIPROLENE) 0.05 % cream as needed.   3    CALCIUM CARBONATE/VITAMIN D3 (VITAMIN D-3 ORAL) Take 2,000 Int'l Units by mouth once daily.      ezetimibe (ZETIA) 10 mg tablet Take 10 mg by mouth once daily.      hydrocodone-acetaminophen 5-325mg (NORCO) 5-325 mg per tablet Take 1 tablet by mouth every 6 (six) hours as needed for Pain. 40 tablet 0    ibuprofen (ADVIL,MOTRIN) 800 MG tablet Take 800 mg by mouth every 8 (eight) hours as needed.  0    rosuvastatin (CRESTOR) 40 MG Tab Take 1 tablet (40 mg total) by mouth once daily. 90 tablet 3    UBIDECARENONE (COQ-10 ORAL) Take 200 mg by mouth once daily.       No current facility-administered medications on file prior to visit.        BP (!) 153/80   Pulse (!) 57   Ht 6' 1" (1.854 m)   Wt 99.8 kg (220 lb)   BMI 29.03 kg/m²       Objective:     Physical Exam   Constitutional: He is oriented to person, place, and time. He appears well-developed and well-nourished. No distress.   HENT:   Head: Normocephalic and atraumatic.   Eyes: Conjunctivae are normal. Right eye exhibits no discharge. Left eye exhibits no discharge. No scleral icterus.   Neck: Neck supple. No JVD present. Carotid bruit is not present. No thyromegaly present.   Cardiovascular: Normal rate, regular rhythm, S1 normal and S2 normal. Exam reveals gallop and S4. Exam reveals no S3.   No murmur heard.  Pulses:       Radial pulses are 2+ on the right side, and 2+ on the left side.        Dorsalis pedis pulses are 2+ on the right side, and 2+ on the left side.        Posterior tibial pulses are 2+ on the right side, and 2+ on the left side.   Pulmonary/Chest: Effort normal and breath sounds normal.   Median sternotomy scar.   Abdominal: Soft. Normal appearance. There is no tenderness.   Musculoskeletal:        Right ankle: He exhibits no swelling, no ecchymosis and no " deformity.        Left ankle: He exhibits no swelling, no ecchymosis and no deformity.   Lymphadenopathy:        Head (right side): No submandibular adenopathy present.        Head (left side): No submandibular adenopathy present.     He has no cervical adenopathy.   Neurological: He is alert and oriented to person, place, and time.   Skin: Skin is warm, dry and intact. No rash noted. He is not diaphoretic.   Psychiatric: He has a normal mood and affect. His speech is normal and behavior is normal. Judgment and thought content normal. Cognition and memory are normal.       Assessment:     1. Coronary artery disease involving native coronary artery of native heart with angina pectoris    2. History of coronary artery bypass surgery    3. Precordial pain    4. Left bundle branch block    5. Hypercholesterolemia    6. Overweight    7. Chronic midline low back pain without sciatica        Plan:      1. Coronary Artery Disease   2/28/2013: [facility]: Cath: 3V CAD.    3/4/2013: [facility]: CABG x 5: LIMA to LAD, SVG1 to D1, SVG2 to RI & OM1 and SVG3 to RCA.    On aspirin 81 mg Q24.   Appears stable.    2. Chest Pain   8/15/2016: Experienced chest pain.   8/30/2016: ECG: New LBBB.   10/20/2016: Echo: Normal left ventricular size and systolic function. Mild LVH. Moderate diastolic dysfunction. Mildly dilated LA.   10/20/2016: Regadenoson MPI: Small moderately dense fixed inferoapical defect.   10/26/2018: Echo: Normal left ventricular size and systolic function. Mild LVH. Mildly dilated LA.   6/2018: Episode of epigastric pain.   Appeared atypical.   11/2019: Recurrent pain.   Do Stress Echo: Begin pantoprazole 40 mg Q24. NTG 0.4 mg sl PRN.     3. Left Bundle Branch Block   8/30/2016: Diagnosed.   8/22/2018: ECG: QRS narrow.   2/6/2020: NSR. RBBB.   Intermittent left bundle branch block.   He has been totally asymptomatic.   In the absence of any symptoms, no bradycardia and good chronotropic response does not appear further  "evaluation is warranted at this time.    Will need close follow up of conduction disease    4. Blood Pressure   8/22/2018: 135/79 mmHg.   4/24/2019: 159/86 mmHg.   Keeping log at home. Readings 120-130/70-80 mmHg.   Well controlled.    5. Hypercholesterolemia   5/16/2013: Chol 209. HDL 43. . .   Was on atorvastatin 20 mg Q24 - unable to tolerate 40 mg Q24.   On rosuvastatin 40 mg Q24 and ezetimide 10 mg Q24.   12/10/2014: Chol 172. HDL 46. LDL 87. .   9/24/2015: Chol 148. HDL 50. LDL 78. TG 99.   9/20/2018: Chol 124. HDL 48. LDL 65. TG 54.   On rosuvastatin 40 mg Q24.   Well controlled    6. Overweight   8/22/2018: Weight 96 kg. BMI 28.   Encouraged to lose weight.    7. Back pain   2012: Back Surgery.    8. Primary Care   Dr. Patricia Barrett.    F/u 2 months.    Tiffanie Hawk M.D.      2/7/2020 7:33 PM, Addendum:    2/7/2020: Stress Echo: 10:00 min. No CP. ECG "negative". RBBB. Echo: "negative". Normal left ventricular size and systolic function.    Appears that his recent chest and left shoulder and arm pain has a noncardiac cause.    I discussed the above test result and the implications of the findings with the patient.    Tiffanie Hawk M.D.          "

## 2020-02-07 ENCOUNTER — CLINICAL SUPPORT (OUTPATIENT)
Dept: CARDIOLOGY | Facility: CLINIC | Age: 65
End: 2020-02-07
Attending: INTERNAL MEDICINE
Payer: COMMERCIAL

## 2020-02-07 LAB
CV STRESS BASE HR: 57 BPM
DIASTOLIC BLOOD PRESSURE: 69 MMHG
OHS CV CPX 1 MINUTE RECOVERY HEART RATE: 105 BPM
OHS CV CPX 85 PERCENT MAX PREDICTED HEART RATE MALE: 133
OHS CV CPX ESTIMATED METS: 13
OHS CV CPX MAX PREDICTED HEART RATE: 156
OHS CV CPX PATIENT IS FEMALE: 0
OHS CV CPX PATIENT IS MALE: 1
OHS CV CPX PEAK DIASTOLIC BLOOD PRESSURE: 78 MMHG
OHS CV CPX PEAK HEAR RATE: 130 BPM
OHS CV CPX PEAK RATE PRESSURE PRODUCT: NORMAL
OHS CV CPX PEAK SYSTOLIC BLOOD PRESSURE: 152 MMHG
OHS CV CPX PERCENT MAX PREDICTED HEART RATE ACHIEVED: 83
OHS CV CPX RATE PRESSURE PRODUCT PRESENTING: 7923
STRESS ANGINA INDEX: 0
STRESS DUKE TREADMILL SCORE: 10
STRESS ECHO POST EXERCISE DUR MIN: 10 MINUTES
STRESS ECHO POST EXERCISE DUR SEC: 0 SECONDS
STRESS ST DEPRESSION: 0 MM
STRESS ST ELEVATION: 0 MM
SYSTOLIC BLOOD PRESSURE: 139 MMHG

## 2020-06-18 ENCOUNTER — OFFICE VISIT (OUTPATIENT)
Dept: CARDIOLOGY | Facility: CLINIC | Age: 65
End: 2020-06-18
Attending: INTERNAL MEDICINE
Payer: COMMERCIAL

## 2020-06-18 VITALS
SYSTOLIC BLOOD PRESSURE: 137 MMHG | BODY MASS INDEX: 28.52 KG/M2 | DIASTOLIC BLOOD PRESSURE: 79 MMHG | WEIGHT: 215.19 LBS | HEIGHT: 73 IN | HEART RATE: 50 BPM

## 2020-06-18 DIAGNOSIS — G89.29 CHRONIC MIDLINE LOW BACK PAIN WITHOUT SCIATICA: ICD-10-CM

## 2020-06-18 DIAGNOSIS — E78.00 HYPERCHOLESTEROLEMIA: ICD-10-CM

## 2020-06-18 DIAGNOSIS — E66.3 OVERWEIGHT: ICD-10-CM

## 2020-06-18 DIAGNOSIS — I25.10 CORONARY ARTERY DISEASE INVOLVING NATIVE CORONARY ARTERY OF NATIVE HEART WITHOUT ANGINA PECTORIS: ICD-10-CM

## 2020-06-18 DIAGNOSIS — Z87.898 HISTORY OF CHEST PAIN: ICD-10-CM

## 2020-06-18 DIAGNOSIS — I44.7 LEFT BUNDLE BRANCH BLOCK: ICD-10-CM

## 2020-06-18 DIAGNOSIS — M54.50 CHRONIC MIDLINE LOW BACK PAIN WITHOUT SCIATICA: ICD-10-CM

## 2020-06-18 DIAGNOSIS — Z95.1 HISTORY OF CORONARY ARTERY BYPASS SURGERY: ICD-10-CM

## 2020-06-18 PROCEDURE — 99214 PR OFFICE/OUTPT VISIT, EST, LEVL IV, 30-39 MIN: ICD-10-PCS | Mod: S$GLB,,, | Performed by: INTERNAL MEDICINE

## 2020-06-18 PROCEDURE — 99999 PR PBB SHADOW E&M-EST. PATIENT-LVL III: ICD-10-PCS | Mod: PBBFAC,,, | Performed by: INTERNAL MEDICINE

## 2020-06-18 PROCEDURE — 99214 OFFICE O/P EST MOD 30 MIN: CPT | Mod: S$GLB,,, | Performed by: INTERNAL MEDICINE

## 2020-06-18 PROCEDURE — 3008F PR BODY MASS INDEX (BMI) DOCUMENTED: ICD-10-PCS | Mod: CPTII,S$GLB,, | Performed by: INTERNAL MEDICINE

## 2020-06-18 PROCEDURE — 99999 PR PBB SHADOW E&M-EST. PATIENT-LVL III: CPT | Mod: PBBFAC,,, | Performed by: INTERNAL MEDICINE

## 2020-06-18 PROCEDURE — 3008F BODY MASS INDEX DOCD: CPT | Mod: CPTII,S$GLB,, | Performed by: INTERNAL MEDICINE

## 2020-06-18 RX ORDER — ASPIRIN 81 MG/1
81 TABLET ORAL DAILY
Qty: 90 TABLET | Refills: 3 | Status: SHIPPED | OUTPATIENT
Start: 2020-06-18 | End: 2021-01-14 | Stop reason: SDUPTHER

## 2020-06-18 RX ORDER — EZETIMIBE 10 MG/1
10 TABLET ORAL DAILY
Qty: 90 TABLET | Refills: 3 | Status: SHIPPED | OUTPATIENT
Start: 2020-06-18 | End: 2021-01-14 | Stop reason: SDUPTHER

## 2020-06-18 RX ORDER — ROSUVASTATIN CALCIUM 40 MG/1
40 TABLET, COATED ORAL DAILY
Qty: 90 TABLET | Refills: 3 | Status: SHIPPED | OUTPATIENT
Start: 2020-06-18 | End: 2021-01-14 | Stop reason: SDUPTHER

## 2020-06-18 NOTE — PROGRESS NOTES
Subjective:     Richie Johnson is a 65 y.o. male with hypercholesterolemia. He is overweight. He has severe coronary artery disease and underwent coronary artery bypass grafting on 3/4/2013 receiving five grafts. He had some chest discomfort in 2016 and then had epigastric pain 6/2018. He has intermittent left bundle branch block. In 11/2019 he began experience epigastric discomfort and left arm pain. Not clear exertional chest pain and no clear association with playing tennis. He may have had myalgia on statin in the past but appears to tolerate rosuvastatin well now. No exertional dyspnea. No palpitations or weak spells. Feeling well overall.      Coronary Artery Disease  Presents for follow-up visit. The disease course has been stable. Pertinent negatives include no chest pain, chest pressure, chest tightness, dizziness, leg swelling, muscle weakness, palpitations, shortness of breath or weight gain. Risk factors include hyperlipidemia. Risk factors do not include decreased physical activity, diabetes, hypertension or obesity. The symptoms have been improving.   Chest Pain   This is a chronic problem. The current episode started more than 1 month ago. The problem has been resolved. The pain is at a severity of 2/10. Associated symptoms include back pain. Pertinent negatives include no abdominal pain, claudication, cough, diaphoresis, dizziness, exertional chest pressure, fever, headaches, hemoptysis, irregular heartbeat, leg pain, lower extremity edema, malaise/fatigue, nausea, near-syncope, numbness, orthopnea, palpitations, PND, shortness of breath, sputum production, syncope, vomiting or weakness.   His past medical history is significant for CAD and hyperlipidemia.   Pertinent negatives for past medical history include no diabetes, no hypertension and no muscle weakness.   Hyperlipidemia  This is a chronic problem. The current episode started more than 1 year ago. The problem is controlled. Recent lipid  tests were reviewed and are normal. He has no history of chronic renal disease, diabetes, hypothyroidism, liver disease, obesity or nephrotic syndrome. Pertinent negatives include no chest pain, focal sensory loss, focal weakness, leg pain, myalgias or shortness of breath.       Review of Systems   Constitution: Negative for diaphoresis, fever, malaise/fatigue and weight gain.   HENT: Negative for nosebleeds.    Eyes: Negative for blurred vision, double vision, pain, vision loss in left eye and vision loss in right eye.   Cardiovascular: Negative for chest pain, claudication, dyspnea on exertion, irregular heartbeat, leg swelling, near-syncope, orthopnea, palpitations, paroxysmal nocturnal dyspnea and syncope.   Respiratory: Negative for chest tightness, cough, hemoptysis, shortness of breath, sputum production and wheezing.    Endocrine: Negative for cold intolerance and heat intolerance.   Hematologic/Lymphatic: Negative for bleeding problem. Does not bruise/bleed easily.   Skin: Negative for color change and itching.   Musculoskeletal: Positive for back pain. Negative for falls, muscle weakness and myalgias.   Gastrointestinal: Negative for abdominal pain, flatus, heartburn, hematemesis, hematochezia, hemorrhoids, jaundice, melena, nausea and vomiting.   Genitourinary: Negative for dysuria and hematuria.   Neurological: Negative for dizziness, focal weakness, headaches, light-headedness, loss of balance, numbness, sensory change, tremors and weakness.   Psychiatric/Behavioral: Negative for altered mental status, depression and memory loss. The patient is not nervous/anxious.    Allergic/Immunologic: Negative for hives and persistent infections.       Current Outpatient Medications on File Prior to Visit   Medication Sig Dispense Refill    aspirin (ECOTRIN) 81 MG EC tablet Take 1 tablet (81 mg total) by mouth once daily. 90 tablet 3    CALCIUM CARBONATE/VITAMIN D3 (VITAMIN D-3 ORAL) Take 2,000 Int'l Units by  "mouth once daily.      ezetimibe (ZETIA) 10 mg tablet Take 10 mg by mouth once daily.      ibuprofen (ADVIL,MOTRIN) 800 MG tablet Take 800 mg by mouth every 8 (eight) hours as needed.  0    rosuvastatin (CRESTOR) 40 MG Tab Take 1 tablet (40 mg total) by mouth once daily. 90 tablet 3    UBIDECARENONE (COQ-10 ORAL) Take 200 mg by mouth once daily.      betamethasone dipropionate (DIPROLENE) 0.05 % cream as needed.   3    hydrocodone-acetaminophen 5-325mg (NORCO) 5-325 mg per tablet Take 1 tablet by mouth every 6 (six) hours as needed for Pain. 40 tablet 0    nitroGLYCERIN (NITROSTAT) 0.4 MG SL tablet Place 1 tablet (0.4 mg total) under the tongue every 5 (five) minutes as needed for Chest pain (1 Tablet under the tongue PRN chest pain.). (Patient not taking: Reported on 6/18/2020) 30 tablet 11    pantoprazole (PROTONIX) 40 MG tablet Take 1 tablet (40 mg total) by mouth once daily. (Patient not taking: Reported on 6/18/2020) 30 tablet 11     No current facility-administered medications on file prior to visit.        /79 (BP Location: Left arm, Patient Position: Sitting)   Pulse (!) 50   Ht 6' 1" (1.854 m)   Wt 97.6 kg (215 lb 2.7 oz)   BMI 28.39 kg/m²       Objective:     Physical Exam   Constitutional: He is oriented to person, place, and time. He appears well-developed and well-nourished. No distress.   HENT:   Head: Normocephalic and atraumatic.   Eyes: Conjunctivae are normal. Right eye exhibits no discharge. Left eye exhibits no discharge. No scleral icterus.   Neck: Neck supple. No JVD present. Carotid bruit is not present. No thyromegaly present.   Cardiovascular: Normal rate, regular rhythm, S1 normal and S2 normal. Exam reveals gallop and S4. Exam reveals no S3.   No murmur heard.  Pulses:       Radial pulses are 2+ on the right side and 2+ on the left side.        Dorsalis pedis pulses are 2+ on the right side and 2+ on the left side.        Posterior tibial pulses are 2+ on the right side " "and 2+ on the left side.   Pulmonary/Chest: Effort normal and breath sounds normal.   Median sternotomy scar.   Abdominal: Soft. Normal appearance. There is no abdominal tenderness.   Musculoskeletal:      Right ankle: He exhibits no swelling, no ecchymosis and no deformity.      Left ankle: He exhibits no swelling, no ecchymosis and no deformity.   Lymphadenopathy:        Head (right side): No submandibular adenopathy present.        Head (left side): No submandibular adenopathy present.     He has no cervical adenopathy.   Neurological: He is alert and oriented to person, place, and time.   Skin: Skin is warm, dry and intact. No rash noted. He is not diaphoretic.   Psychiatric: He has a normal mood and affect. His speech is normal and behavior is normal. Judgment and thought content normal. Cognition and memory are normal.       Assessment:     1. Coronary artery disease involving native coronary artery of native heart without angina pectoris    2. History of coronary artery bypass surgery    3. History of chest pain    4. Left bundle branch block    5. Hypercholesterolemia    6. Overweight    7. Chronic midline low back pain without sciatica        Plan:      1. Coronary Artery Disease   2/28/2013: [facility]: Cath: 3V CAD.    3/4/2013: [facility]: CABG x 5: LIMA to LAD, SVG1 to D1, SVG2 to RI & OM1 and SVG3 to RCA.   2/7/2020: Stress Echo: 10:00 min. No CP. ECG "negative". RBBB. Echo: "negative". Normal left ventricular size and systolic function.   On aspirin 81 mg Q24.   Appears stable.    2. History of Chest Pain   8/15/2016: Experienced chest pain.   8/30/2016: ECG: New LBBB.   10/20/2016: Echo: Normal left ventricular size and systolic function. Mild LVH. Moderate diastolic dysfunction. Mildly dilated LA.   10/20/2016: Regadenoson MPI: Small moderately dense fixed inferoapical defect.   10/26/2018: Echo: Normal left ventricular size and systolic function. Mild LVH. Mildly dilated LA.   6/2018: Episode of epigastric " pain.   11/2019: Recurrent pain.   Appeared atypical.   Resolved.     3. Left Bundle Branch Block   8/30/2016: Diagnosed.   8/22/2018: ECG: QRS narrow.   2/6/2020: NSR. RBBB.   Intermittent left bundle branch block.   He has been totally asymptomatic.   In the absence of any symptoms, no bradycardia and good chronotropic response does not appear further evaluation is warranted at this time.    Will need close follow up of conduction disease    4. Blood Pressure   8/22/2018: 135/79 mmHg.   4/24/2019: 159/86 mmHg.   6/18/2020: 137/79 mmHg.   Keeping log at home.    Well controlled.    5. Hypercholesterolemia   5/16/2013: Chol 209. HDL 43. . .   Was on atorvastatin 20 mg Q24 - unable to tolerate 40 mg Q24.   On rosuvastatin 40 mg Q24 and ezetimide 10 mg Q24.   12/10/2014: Chol 172. HDL 46. LDL 87. .   9/24/2015: Chol 148. HDL 50. LDL 78. TG 99.   9/20/2018: Chol 124. HDL 48. LDL 65. TG 54.   On rosuvastatin 40 mg Q24 and ezetimibe 10 mg Q24.   Tells me well controlled    6. Overweight   8/22/2018: Weight 96 kg. BMI 28.   Encouraged to lose weight.    7. Back pain   2012: Back Surgery.    8. Primary Care   Dr. Patricia Barrett.    F/u 3 months.    Tiffanie Hawk M.D.

## 2020-09-17 ENCOUNTER — OFFICE VISIT (OUTPATIENT)
Dept: CARDIOLOGY | Facility: CLINIC | Age: 65
End: 2020-09-17
Attending: INTERNAL MEDICINE
Payer: COMMERCIAL

## 2020-09-17 VITALS
BODY MASS INDEX: 29.41 KG/M2 | DIASTOLIC BLOOD PRESSURE: 80 MMHG | HEART RATE: 54 BPM | SYSTOLIC BLOOD PRESSURE: 152 MMHG | HEIGHT: 73 IN | WEIGHT: 221.88 LBS

## 2020-09-17 DIAGNOSIS — I25.10 CORONARY ARTERY DISEASE INVOLVING NATIVE CORONARY ARTERY OF NATIVE HEART WITHOUT ANGINA PECTORIS: ICD-10-CM

## 2020-09-17 DIAGNOSIS — Z95.1 HISTORY OF CORONARY ARTERY BYPASS SURGERY: ICD-10-CM

## 2020-09-17 DIAGNOSIS — I10 ESSENTIAL HYPERTENSION: ICD-10-CM

## 2020-09-17 DIAGNOSIS — E78.00 HYPERCHOLESTEROLEMIA: ICD-10-CM

## 2020-09-17 DIAGNOSIS — I44.7 LEFT BUNDLE BRANCH BLOCK: ICD-10-CM

## 2020-09-17 DIAGNOSIS — Z87.898 HISTORY OF CHEST PAIN: ICD-10-CM

## 2020-09-17 DIAGNOSIS — M54.50 CHRONIC MIDLINE LOW BACK PAIN WITHOUT SCIATICA: ICD-10-CM

## 2020-09-17 DIAGNOSIS — G89.29 CHRONIC MIDLINE LOW BACK PAIN WITHOUT SCIATICA: ICD-10-CM

## 2020-09-17 DIAGNOSIS — E66.3 OVERWEIGHT: ICD-10-CM

## 2020-09-17 PROCEDURE — 99999 PR PBB SHADOW E&M-EST. PATIENT-LVL III: CPT | Mod: PBBFAC,,, | Performed by: INTERNAL MEDICINE

## 2020-09-17 PROCEDURE — 3008F PR BODY MASS INDEX (BMI) DOCUMENTED: ICD-10-PCS | Mod: CPTII,S$GLB,, | Performed by: INTERNAL MEDICINE

## 2020-09-17 PROCEDURE — 99214 OFFICE O/P EST MOD 30 MIN: CPT | Mod: S$GLB,,, | Performed by: INTERNAL MEDICINE

## 2020-09-17 PROCEDURE — 99999 PR PBB SHADOW E&M-EST. PATIENT-LVL III: ICD-10-PCS | Mod: PBBFAC,,, | Performed by: INTERNAL MEDICINE

## 2020-09-17 PROCEDURE — 99214 PR OFFICE/OUTPT VISIT, EST, LEVL IV, 30-39 MIN: ICD-10-PCS | Mod: S$GLB,,, | Performed by: INTERNAL MEDICINE

## 2020-09-17 PROCEDURE — 3008F BODY MASS INDEX DOCD: CPT | Mod: CPTII,S$GLB,, | Performed by: INTERNAL MEDICINE

## 2020-09-17 RX ORDER — MULTIVITAMIN
1 TABLET ORAL DAILY
COMMUNITY

## 2020-09-17 RX ORDER — NAPROXEN SODIUM 220 MG
220 TABLET ORAL 2 TIMES DAILY WITH MEALS
COMMUNITY

## 2020-09-17 RX ORDER — RAMIPRIL 5 MG/1
5 CAPSULE ORAL DAILY
Qty: 90 CAPSULE | Refills: 3 | Status: SHIPPED | OUTPATIENT
Start: 2020-09-17 | End: 2021-01-14 | Stop reason: SDUPTHER

## 2020-09-17 NOTE — PROGRESS NOTES
Subjective:     Richie Johnson is a 65 y.o. male with hypercholesterolemia. He is overweight. He has severe coronary artery disease and underwent coronary artery bypass grafting on 3/4/2013 receiving five grafts. He had some chest discomfort in 2016 and then had epigastric pain 6/2018. He has intermittent left bundle branch block. In 11/2019 he began experience epigastric discomfort and left arm pain. Not clear exertional chest pain and no clear association with playing tennis. He may have had myalgia on statin in the past but appears to tolerate rosuvastatin well now. No exertional dyspnea. No palpitations or weak spells. Feeling well overall.      Coronary Artery Disease  Presents for follow-up visit. The disease course has been stable. Pertinent negatives include no chest pain, chest pressure, chest tightness, dizziness, leg swelling, muscle weakness, palpitations, shortness of breath or weight gain. Risk factors include hyperlipidemia. Risk factors do not include decreased physical activity, diabetes, hypertension or obesity. The symptoms have been stable.   Chest Pain   This is a chronic problem. The current episode started more than 1 month ago. The problem has been resolved. The pain is at a severity of 2/10. Associated symptoms include back pain. Pertinent negatives include no abdominal pain, claudication, cough, diaphoresis, dizziness, exertional chest pressure, fever, headaches, hemoptysis, irregular heartbeat, leg pain, lower extremity edema, malaise/fatigue, nausea, near-syncope, numbness, orthopnea, palpitations, PND, shortness of breath, sputum production, syncope, vomiting or weakness.   His past medical history is significant for CAD and hyperlipidemia.   Pertinent negatives for past medical history include no diabetes, no hypertension and no muscle weakness.   Hyperlipidemia  This is a chronic problem. The current episode started more than 1 year ago. The problem is controlled. Recent lipid  tests were reviewed and are normal. He has no history of chronic renal disease, diabetes, hypothyroidism, liver disease, obesity or nephrotic syndrome. Pertinent negatives include no chest pain, focal sensory loss, focal weakness, leg pain, myalgias or shortness of breath.   Hypertension  The problem is unchanged. The problem is uncontrolled. Pertinent negatives include no anxiety, blurred vision, chest pain, headaches, malaise/fatigue, neck pain, orthopnea, palpitations, PND, shortness of breath or sweats. There is no history of chronic renal disease.       Review of Systems   Constitution: Negative for diaphoresis, fever, malaise/fatigue and weight gain.   HENT: Negative for nosebleeds.    Eyes: Negative for blurred vision, double vision, pain, vision loss in left eye and vision loss in right eye.   Cardiovascular: Negative for chest pain, claudication, dyspnea on exertion, irregular heartbeat, leg swelling, near-syncope, orthopnea, palpitations, paroxysmal nocturnal dyspnea and syncope.   Respiratory: Negative for chest tightness, cough, hemoptysis, shortness of breath, sputum production and wheezing.    Endocrine: Negative for cold intolerance and heat intolerance.   Hematologic/Lymphatic: Negative for bleeding problem. Does not bruise/bleed easily.   Skin: Negative for color change and itching.   Musculoskeletal: Positive for back pain. Negative for falls, muscle weakness, myalgias and neck pain.   Gastrointestinal: Negative for abdominal pain, flatus, heartburn, hematemesis, hematochezia, hemorrhoids, jaundice, melena, nausea and vomiting.   Genitourinary: Negative for dysuria and hematuria.   Neurological: Negative for dizziness, focal weakness, headaches, light-headedness, loss of balance, numbness, sensory change, tremors and weakness.   Psychiatric/Behavioral: Negative for altered mental status, depression and memory loss. The patient is not nervous/anxious.    Allergic/Immunologic: Negative for hives and  "persistent infections.       Current Outpatient Medications on File Prior to Visit   Medication Sig Dispense Refill    aspirin (ECOTRIN) 81 MG EC tablet Take 1 tablet (81 mg total) by mouth once daily. 90 tablet 3    betamethasone dipropionate (DIPROLENE) 0.05 % cream as needed.   3    CALCIUM CARBONATE/VITAMIN D3 (VITAMIN D-3 ORAL) Take 2,000 Int'l Units by mouth once daily.      ezetimibe (ZETIA) 10 mg tablet Take 1 tablet (10 mg total) by mouth once daily. 90 tablet 3    multivitamin (ONE DAILY MULTIVITAMIN) per tablet Take 1 tablet by mouth once daily.      naproxen sodium (ANAPROX) 220 MG tablet Take 220 mg by mouth 2 (two) times daily with meals.      rosuvastatin (CRESTOR) 40 MG Tab Take 1 tablet (40 mg total) by mouth once daily. 90 tablet 3    UBIDECARENONE (COQ-10 ORAL) Take 300 mg by mouth once daily.       ibuprofen (ADVIL,MOTRIN) 800 MG tablet Take 800 mg by mouth every 8 (eight) hours as needed.  0    nitroGLYCERIN (NITROSTAT) 0.4 MG SL tablet Place 1 tablet (0.4 mg total) under the tongue every 5 (five) minutes as needed for Chest pain (1 Tablet under the tongue PRN chest pain.). (Patient not taking: Reported on 6/18/2020) 30 tablet 11    [DISCONTINUED] hydrocodone-acetaminophen 5-325mg (NORCO) 5-325 mg per tablet Take 1 tablet by mouth every 6 (six) hours as needed for Pain. 40 tablet 0     No current facility-administered medications on file prior to visit.        BP (!) 152/80   Pulse (!) 54   Ht 6' 1" (1.854 m)   Wt 100.7 kg (221 lb 14.3 oz)   BMI 29.28 kg/m²       Objective:     Physical Exam   Constitutional: He is oriented to person, place, and time. He appears well-developed and well-nourished. No distress.   HENT:   Head: Normocephalic and atraumatic.   Eyes: Conjunctivae are normal. Right eye exhibits no discharge. Left eye exhibits no discharge. No scleral icterus.   Neck: Neck supple. No JVD present. Carotid bruit is not present. No thyromegaly present.   Cardiovascular: " "Normal rate, regular rhythm, S1 normal and S2 normal. Exam reveals gallop and S4. Exam reveals no S3.   No murmur heard.  Pulses:       Radial pulses are 2+ on the right side and 2+ on the left side.        Dorsalis pedis pulses are 2+ on the right side and 2+ on the left side.        Posterior tibial pulses are 2+ on the right side and 2+ on the left side.   Pulmonary/Chest: Effort normal and breath sounds normal.   Median sternotomy scar.   Abdominal: Soft. Normal appearance. There is no abdominal tenderness.   Musculoskeletal:      Right ankle: He exhibits no swelling, no ecchymosis and no deformity.      Left ankle: He exhibits no swelling, no ecchymosis and no deformity.   Lymphadenopathy:        Head (right side): No submandibular adenopathy present.        Head (left side): No submandibular adenopathy present.     He has no cervical adenopathy.   Neurological: He is alert and oriented to person, place, and time.   Skin: Skin is warm, dry and intact. No rash noted. He is not diaphoretic.   Psychiatric: He has a normal mood and affect. His speech is normal and behavior is normal. Judgment and thought content normal. Cognition and memory are normal.       Assessment:     1. Coronary artery disease involving native coronary artery of native heart without angina pectoris    2. History of coronary artery bypass surgery    3. History of chest pain    4. Left bundle branch block    5. Essential hypertension    6. Hypercholesterolemia    7. Overweight    8. Chronic midline low back pain without sciatica        Plan:      1. Coronary Artery Disease   2/28/2013: [facility]: Cath: 3V CAD.    3/4/2013: [facility]: CABG x 5: LIMA to LAD, SVG1 to D1, SVG2 to RI & OM1 and SVG3 to RCA.   2/7/2020: Stress Echo: 10:00 min. No CP. ECG "negative". RBBB. Echo: "negative". Normal left ventricular size and systolic function.   On aspirin 81 mg Q24.   Appears stable.    2. History of Chest Pain   8/15/2016: Experienced chest pain.   8/30/2016: " ECG: New LBBB.   10/20/2016: Echo: Normal left ventricular size and systolic function. Mild LVH. Moderate diastolic dysfunction. Mildly dilated LA.   10/20/2016: Regadenoson MPI: Small moderately dense fixed inferoapical defect.   10/26/2018: Echo: Normal left ventricular size and systolic function. Mild LVH. Mildly dilated LA.   6/2018: Episode of epigastric pain.   11/2019: Recurrent pain.   Appeared atypical.   Resolved.     3. Left Bundle Branch Block   8/30/2016: Diagnosed.   8/22/2018: ECG: QRS narrow.   2/6/2020: NSR. RBBB.   Intermittent left bundle branch block.   He has been totally asymptomatic.   In the absence of any symptoms, no bradycardia and good chronotropic response does not appear further evaluation is warranted at this time.    Will need close follow up of conduction disease    4. Hypertension   8/22/2018: 135/79 mmHg.   4/24/2019: 159/86 mmHg.   6/18/2020: 137/79 mmHg.   9/17/2020: 152/80 mmHg.   9/17/2020: Ramipril 5 mg Q24 to begin.    Keep log at home.    5. Hypercholesterolemia   5/16/2013: Chol 209. HDL 43. . .   Was on atorvastatin 20 mg Q24 - unable to tolerate 40 mg Q24.   On rosuvastatin 40 mg Q24 and ezetimide 10 mg Q24.   12/10/2014: Chol 172. HDL 46. LDL 87. .   9/24/2015: Chol 148. HDL 50. LDL 78. TG 99.   9/20/2018: Chol 124. HDL 48. LDL 65. TG 54.   On rosuvastatin 40 mg Q24 and ezetimibe 10 mg Q24.   Tells me well controlled    6. Overweight   8/22/2018: Weight 96 kg. BMI 28.   Encouraged to lose weight.    7. Back pain   2012: Back Surgery.    8. Primary Care   Dr. Patricia Barrett.    F/u 3 months.    Tiffanie Hawk M.D.

## 2020-09-30 ENCOUNTER — CLINICAL SUPPORT (OUTPATIENT)
Dept: OTHER | Facility: CLINIC | Age: 65
End: 2020-09-30
Payer: COMMERCIAL

## 2020-09-30 DIAGNOSIS — Z00.8 ENCOUNTER FOR OTHER GENERAL EXAMINATION: ICD-10-CM

## 2020-09-30 PROCEDURE — 99401 PR PREVENT COUNSEL,INDIV,15 MIN: ICD-10-PCS | Mod: S$GLB,,, | Performed by: INTERNAL MEDICINE

## 2020-09-30 PROCEDURE — 82947 PR  ASSAY QUANTITATIVE,BLOOD GLUCOSE: ICD-10-PCS | Mod: QW,S$GLB,, | Performed by: INTERNAL MEDICINE

## 2020-09-30 PROCEDURE — 80061 LIPID PANEL: CPT | Mod: QW,S$GLB,, | Performed by: INTERNAL MEDICINE

## 2020-09-30 PROCEDURE — 80061 PR  LIPID PANEL: ICD-10-PCS | Mod: QW,S$GLB,, | Performed by: INTERNAL MEDICINE

## 2020-09-30 PROCEDURE — 82947 ASSAY GLUCOSE BLOOD QUANT: CPT | Mod: QW,S$GLB,, | Performed by: INTERNAL MEDICINE

## 2020-09-30 PROCEDURE — 99401 PREV MED CNSL INDIV APPRX 15: CPT | Mod: S$GLB,,, | Performed by: INTERNAL MEDICINE

## 2020-10-02 VITALS — HEIGHT: 73 IN | BODY MASS INDEX: 29.28 KG/M2

## 2020-10-02 LAB
HDLC SERPL-MCNC: 45 MG/DL
POC CHOLESTEROL, LDL (DOCK): 135 MG/DL
POC CHOLESTEROL, TOTAL: 202 MG/DL
POC GLUCOSE, FASTING: 93 MG/DL (ref 60–110)
TRIGL SERPL-MCNC: 110 MG/DL

## 2021-01-14 ENCOUNTER — PATIENT MESSAGE (OUTPATIENT)
Dept: CARDIOLOGY | Facility: CLINIC | Age: 66
End: 2021-01-14

## 2021-01-14 ENCOUNTER — OFFICE VISIT (OUTPATIENT)
Dept: CARDIOLOGY | Facility: CLINIC | Age: 66
End: 2021-01-14
Attending: INTERNAL MEDICINE
Payer: COMMERCIAL

## 2021-01-14 VITALS
HEIGHT: 73 IN | DIASTOLIC BLOOD PRESSURE: 75 MMHG | SYSTOLIC BLOOD PRESSURE: 128 MMHG | WEIGHT: 221.56 LBS | HEART RATE: 55 BPM | BODY MASS INDEX: 29.36 KG/M2

## 2021-01-14 DIAGNOSIS — E78.00 HYPERCHOLESTEROLEMIA: ICD-10-CM

## 2021-01-14 DIAGNOSIS — I44.7 LEFT BUNDLE BRANCH BLOCK: ICD-10-CM

## 2021-01-14 DIAGNOSIS — Z87.898 HISTORY OF CHEST PAIN: ICD-10-CM

## 2021-01-14 DIAGNOSIS — I10 ESSENTIAL HYPERTENSION: ICD-10-CM

## 2021-01-14 DIAGNOSIS — E66.3 OVERWEIGHT: ICD-10-CM

## 2021-01-14 DIAGNOSIS — Z95.1 HISTORY OF CORONARY ARTERY BYPASS SURGERY: ICD-10-CM

## 2021-01-14 DIAGNOSIS — I25.10 CORONARY ARTERY DISEASE INVOLVING NATIVE CORONARY ARTERY OF NATIVE HEART WITHOUT ANGINA PECTORIS: ICD-10-CM

## 2021-01-14 PROCEDURE — 3078F PR MOST RECENT DIASTOLIC BLOOD PRESSURE < 80 MM HG: ICD-10-PCS | Mod: CPTII,S$GLB,, | Performed by: INTERNAL MEDICINE

## 2021-01-14 PROCEDURE — 99999 PR PBB SHADOW E&M-EST. PATIENT-LVL III: ICD-10-PCS | Mod: PBBFAC,,, | Performed by: INTERNAL MEDICINE

## 2021-01-14 PROCEDURE — 99999 PR PBB SHADOW E&M-EST. PATIENT-LVL III: CPT | Mod: PBBFAC,,, | Performed by: INTERNAL MEDICINE

## 2021-01-14 PROCEDURE — 3008F BODY MASS INDEX DOCD: CPT | Mod: CPTII,S$GLB,, | Performed by: INTERNAL MEDICINE

## 2021-01-14 PROCEDURE — 99214 OFFICE O/P EST MOD 30 MIN: CPT | Mod: S$GLB,,, | Performed by: INTERNAL MEDICINE

## 2021-01-14 PROCEDURE — 99214 PR OFFICE/OUTPT VISIT, EST, LEVL IV, 30-39 MIN: ICD-10-PCS | Mod: S$GLB,,, | Performed by: INTERNAL MEDICINE

## 2021-01-14 PROCEDURE — 3008F PR BODY MASS INDEX (BMI) DOCUMENTED: ICD-10-PCS | Mod: CPTII,S$GLB,, | Performed by: INTERNAL MEDICINE

## 2021-01-14 PROCEDURE — 3074F PR MOST RECENT SYSTOLIC BLOOD PRESSURE < 130 MM HG: ICD-10-PCS | Mod: CPTII,S$GLB,, | Performed by: INTERNAL MEDICINE

## 2021-01-14 PROCEDURE — 3078F DIAST BP <80 MM HG: CPT | Mod: CPTII,S$GLB,, | Performed by: INTERNAL MEDICINE

## 2021-01-14 PROCEDURE — 3074F SYST BP LT 130 MM HG: CPT | Mod: CPTII,S$GLB,, | Performed by: INTERNAL MEDICINE

## 2021-01-14 RX ORDER — ASPIRIN 81 MG/1
81 TABLET ORAL DAILY
Qty: 90 TABLET | Refills: 3 | Status: SHIPPED | OUTPATIENT
Start: 2021-01-14 | End: 2022-01-13 | Stop reason: SDUPTHER

## 2021-01-14 RX ORDER — ROSUVASTATIN CALCIUM 40 MG/1
40 TABLET, COATED ORAL DAILY
Qty: 90 TABLET | Refills: 3 | Status: SHIPPED | OUTPATIENT
Start: 2021-01-14 | End: 2022-01-13 | Stop reason: SDUPTHER

## 2021-01-14 RX ORDER — EZETIMIBE 10 MG/1
10 TABLET ORAL DAILY
Qty: 90 TABLET | Refills: 3 | Status: SHIPPED | OUTPATIENT
Start: 2021-01-14 | End: 2022-01-13 | Stop reason: SDUPTHER

## 2021-01-14 RX ORDER — RAMIPRIL 5 MG/1
5 CAPSULE ORAL DAILY
Qty: 90 CAPSULE | Refills: 3 | Status: SHIPPED | OUTPATIENT
Start: 2021-01-14 | End: 2021-05-13 | Stop reason: SDUPTHER

## 2021-05-13 ENCOUNTER — OFFICE VISIT (OUTPATIENT)
Dept: CARDIOLOGY | Facility: CLINIC | Age: 66
End: 2021-05-13
Attending: INTERNAL MEDICINE
Payer: COMMERCIAL

## 2021-05-13 VITALS
SYSTOLIC BLOOD PRESSURE: 137 MMHG | HEIGHT: 73 IN | BODY MASS INDEX: 28.94 KG/M2 | WEIGHT: 218.38 LBS | HEART RATE: 52 BPM | DIASTOLIC BLOOD PRESSURE: 69 MMHG

## 2021-05-13 DIAGNOSIS — E66.3 OVERWEIGHT: ICD-10-CM

## 2021-05-13 DIAGNOSIS — M54.50 CHRONIC MIDLINE LOW BACK PAIN WITHOUT SCIATICA: ICD-10-CM

## 2021-05-13 DIAGNOSIS — E78.00 HYPERCHOLESTEROLEMIA: ICD-10-CM

## 2021-05-13 DIAGNOSIS — I25.10 CORONARY ARTERY DISEASE INVOLVING NATIVE CORONARY ARTERY OF NATIVE HEART WITHOUT ANGINA PECTORIS: ICD-10-CM

## 2021-05-13 DIAGNOSIS — I44.7 LEFT BUNDLE BRANCH BLOCK: ICD-10-CM

## 2021-05-13 DIAGNOSIS — I10 ESSENTIAL HYPERTENSION: ICD-10-CM

## 2021-05-13 DIAGNOSIS — Z95.1 HISTORY OF CORONARY ARTERY BYPASS SURGERY: ICD-10-CM

## 2021-05-13 DIAGNOSIS — G89.29 CHRONIC MIDLINE LOW BACK PAIN WITHOUT SCIATICA: ICD-10-CM

## 2021-05-13 DIAGNOSIS — Z87.898 HISTORY OF CHEST PAIN: ICD-10-CM

## 2021-05-13 PROCEDURE — 99214 PR OFFICE/OUTPT VISIT, EST, LEVL IV, 30-39 MIN: ICD-10-PCS | Mod: 25,S$GLB,, | Performed by: INTERNAL MEDICINE

## 2021-05-13 PROCEDURE — 99999 PR PBB SHADOW E&M-EST. PATIENT-LVL III: CPT | Mod: PBBFAC,,, | Performed by: INTERNAL MEDICINE

## 2021-05-13 PROCEDURE — 93005 ELECTROCARDIOGRAM TRACING: CPT

## 2021-05-13 PROCEDURE — 99214 OFFICE O/P EST MOD 30 MIN: CPT | Mod: 25,S$GLB,, | Performed by: INTERNAL MEDICINE

## 2021-05-13 PROCEDURE — 99999 PR PBB SHADOW E&M-EST. PATIENT-LVL III: ICD-10-PCS | Mod: PBBFAC,,, | Performed by: INTERNAL MEDICINE

## 2021-05-13 PROCEDURE — 93000 ELECTROCARDIOGRAM COMPLETE: CPT | Mod: S$GLB,,, | Performed by: INTERNAL MEDICINE

## 2021-05-13 PROCEDURE — 3008F BODY MASS INDEX DOCD: CPT | Mod: CPTII,S$GLB,, | Performed by: INTERNAL MEDICINE

## 2021-05-13 PROCEDURE — 93000 EKG 12-LEAD: ICD-10-PCS | Mod: S$GLB,,, | Performed by: INTERNAL MEDICINE

## 2021-05-13 PROCEDURE — 3008F PR BODY MASS INDEX (BMI) DOCUMENTED: ICD-10-PCS | Mod: CPTII,S$GLB,, | Performed by: INTERNAL MEDICINE

## 2021-05-13 RX ORDER — RAMIPRIL 10 MG/1
10 CAPSULE ORAL DAILY
Qty: 90 CAPSULE | Refills: 3 | Status: SHIPPED | OUTPATIENT
Start: 2021-05-13 | End: 2022-01-13 | Stop reason: SDUPTHER

## 2021-06-21 ENCOUNTER — TELEPHONE (OUTPATIENT)
Dept: CARDIOLOGY | Facility: CLINIC | Age: 66
End: 2021-06-21

## 2021-09-16 ENCOUNTER — OFFICE VISIT (OUTPATIENT)
Dept: CARDIOLOGY | Facility: CLINIC | Age: 66
End: 2021-09-16
Attending: INTERNAL MEDICINE
Payer: COMMERCIAL

## 2021-09-16 VITALS
DIASTOLIC BLOOD PRESSURE: 86 MMHG | OXYGEN SATURATION: 97 % | SYSTOLIC BLOOD PRESSURE: 146 MMHG | BODY MASS INDEX: 29.22 KG/M2 | HEIGHT: 73 IN | HEART RATE: 57 BPM | WEIGHT: 220.44 LBS

## 2021-09-16 DIAGNOSIS — M54.50 CHRONIC MIDLINE LOW BACK PAIN WITHOUT SCIATICA: ICD-10-CM

## 2021-09-16 DIAGNOSIS — I25.10 CORONARY ARTERY DISEASE INVOLVING NATIVE CORONARY ARTERY OF NATIVE HEART WITHOUT ANGINA PECTORIS: ICD-10-CM

## 2021-09-16 DIAGNOSIS — Z87.898 HISTORY OF CHEST PAIN: ICD-10-CM

## 2021-09-16 DIAGNOSIS — Z95.1 HISTORY OF CORONARY ARTERY BYPASS SURGERY: ICD-10-CM

## 2021-09-16 DIAGNOSIS — E78.00 HYPERCHOLESTEROLEMIA: ICD-10-CM

## 2021-09-16 DIAGNOSIS — G89.29 CHRONIC MIDLINE LOW BACK PAIN WITHOUT SCIATICA: ICD-10-CM

## 2021-09-16 DIAGNOSIS — E66.3 OVERWEIGHT: ICD-10-CM

## 2021-09-16 DIAGNOSIS — I44.7 LEFT BUNDLE BRANCH BLOCK: ICD-10-CM

## 2021-09-16 DIAGNOSIS — I10 ESSENTIAL HYPERTENSION: ICD-10-CM

## 2021-09-16 PROCEDURE — 1101F PR PT FALLS ASSESS DOC 0-1 FALLS W/OUT INJ PAST YR: ICD-10-PCS | Mod: CPTII,S$GLB,, | Performed by: INTERNAL MEDICINE

## 2021-09-16 PROCEDURE — 1159F PR MEDICATION LIST DOCUMENTED IN MEDICAL RECORD: ICD-10-PCS | Mod: CPTII,S$GLB,, | Performed by: INTERNAL MEDICINE

## 2021-09-16 PROCEDURE — 99214 PR OFFICE/OUTPT VISIT, EST, LEVL IV, 30-39 MIN: ICD-10-PCS | Mod: S$GLB,,, | Performed by: INTERNAL MEDICINE

## 2021-09-16 PROCEDURE — 3288F FALL RISK ASSESSMENT DOCD: CPT | Mod: CPTII,S$GLB,, | Performed by: INTERNAL MEDICINE

## 2021-09-16 PROCEDURE — 99999 PR PBB SHADOW E&M-EST. PATIENT-LVL III: ICD-10-PCS | Mod: PBBFAC,,, | Performed by: INTERNAL MEDICINE

## 2021-09-16 PROCEDURE — 3288F PR FALLS RISK ASSESSMENT DOCUMENTED: ICD-10-PCS | Mod: CPTII,S$GLB,, | Performed by: INTERNAL MEDICINE

## 2021-09-16 PROCEDURE — 1160F RVW MEDS BY RX/DR IN RCRD: CPT | Mod: CPTII,S$GLB,, | Performed by: INTERNAL MEDICINE

## 2021-09-16 PROCEDURE — 1160F PR REVIEW ALL MEDS BY PRESCRIBER/CLIN PHARMACIST DOCUMENTED: ICD-10-PCS | Mod: CPTII,S$GLB,, | Performed by: INTERNAL MEDICINE

## 2021-09-16 PROCEDURE — 1126F PR PAIN SEVERITY QUANTIFIED, NO PAIN PRESENT: ICD-10-PCS | Mod: CPTII,S$GLB,, | Performed by: INTERNAL MEDICINE

## 2021-09-16 PROCEDURE — 99214 OFFICE O/P EST MOD 30 MIN: CPT | Mod: S$GLB,,, | Performed by: INTERNAL MEDICINE

## 2021-09-16 PROCEDURE — 1159F MED LIST DOCD IN RCRD: CPT | Mod: CPTII,S$GLB,, | Performed by: INTERNAL MEDICINE

## 2021-09-16 PROCEDURE — 4010F ACE/ARB THERAPY RXD/TAKEN: CPT | Mod: CPTII,S$GLB,, | Performed by: INTERNAL MEDICINE

## 2021-09-16 PROCEDURE — 3079F DIAST BP 80-89 MM HG: CPT | Mod: CPTII,S$GLB,, | Performed by: INTERNAL MEDICINE

## 2021-09-16 PROCEDURE — 1101F PT FALLS ASSESS-DOCD LE1/YR: CPT | Mod: CPTII,S$GLB,, | Performed by: INTERNAL MEDICINE

## 2021-09-16 PROCEDURE — 4010F PR ACE/ARB THEARPY RXD/TAKEN: ICD-10-PCS | Mod: CPTII,S$GLB,, | Performed by: INTERNAL MEDICINE

## 2021-09-16 PROCEDURE — 3079F PR MOST RECENT DIASTOLIC BLOOD PRESSURE 80-89 MM HG: ICD-10-PCS | Mod: CPTII,S$GLB,, | Performed by: INTERNAL MEDICINE

## 2021-09-16 PROCEDURE — 99999 PR PBB SHADOW E&M-EST. PATIENT-LVL III: CPT | Mod: PBBFAC,,, | Performed by: INTERNAL MEDICINE

## 2021-09-16 PROCEDURE — 3077F PR MOST RECENT SYSTOLIC BLOOD PRESSURE >= 140 MM HG: ICD-10-PCS | Mod: CPTII,S$GLB,, | Performed by: INTERNAL MEDICINE

## 2021-09-16 PROCEDURE — 1126F AMNT PAIN NOTED NONE PRSNT: CPT | Mod: CPTII,S$GLB,, | Performed by: INTERNAL MEDICINE

## 2021-09-16 PROCEDURE — 3077F SYST BP >= 140 MM HG: CPT | Mod: CPTII,S$GLB,, | Performed by: INTERNAL MEDICINE

## 2021-09-16 PROCEDURE — 3008F PR BODY MASS INDEX (BMI) DOCUMENTED: ICD-10-PCS | Mod: CPTII,S$GLB,, | Performed by: INTERNAL MEDICINE

## 2021-09-16 PROCEDURE — 3008F BODY MASS INDEX DOCD: CPT | Mod: CPTII,S$GLB,, | Performed by: INTERNAL MEDICINE

## 2021-10-16 ENCOUNTER — PATIENT MESSAGE (OUTPATIENT)
Dept: CARDIOLOGY | Facility: CLINIC | Age: 66
End: 2021-10-16

## 2021-10-26 ENCOUNTER — HOSPITAL ENCOUNTER (OUTPATIENT)
Dept: PREADMISSION TESTING | Facility: OTHER | Age: 66
Discharge: HOME | End: 2021-10-26
Attending: ORTHOPAEDIC SURGERY
Payer: COMMERCIAL

## 2021-10-26 ENCOUNTER — ANESTHESIA EVENT (OUTPATIENT)
Dept: SURGERY | Facility: OTHER | Age: 66
End: 2021-10-26
Payer: COMMERCIAL

## 2021-10-26 VITALS
HEART RATE: 58 BPM | DIASTOLIC BLOOD PRESSURE: 68 MMHG | SYSTOLIC BLOOD PRESSURE: 136 MMHG | OXYGEN SATURATION: 97 % | WEIGHT: 215 LBS | TEMPERATURE: 96 F | HEIGHT: 73 IN | BODY MASS INDEX: 28.49 KG/M2

## 2021-10-26 RX ORDER — ACETAMINOPHEN 500 MG
1000 TABLET ORAL
Status: CANCELLED | OUTPATIENT
Start: 2021-10-26 | End: 2021-10-26

## 2021-10-26 RX ORDER — SODIUM CHLORIDE, SODIUM LACTATE, POTASSIUM CHLORIDE, CALCIUM CHLORIDE 600; 310; 30; 20 MG/100ML; MG/100ML; MG/100ML; MG/100ML
INJECTION, SOLUTION INTRAVENOUS CONTINUOUS
Status: CANCELLED | OUTPATIENT
Start: 2021-10-26

## 2021-10-29 ENCOUNTER — ANESTHESIA (OUTPATIENT)
Dept: SURGERY | Facility: OTHER | Age: 66
End: 2021-10-29
Payer: COMMERCIAL

## 2021-10-29 ENCOUNTER — HOSPITAL ENCOUNTER (OUTPATIENT)
Facility: OTHER | Age: 66
Discharge: HOME | End: 2021-10-29
Attending: ORTHOPAEDIC SURGERY | Admitting: ORTHOPAEDIC SURGERY
Payer: COMMERCIAL

## 2021-10-29 VITALS
SYSTOLIC BLOOD PRESSURE: 143 MMHG | RESPIRATION RATE: 18 BRPM | BODY MASS INDEX: 28.49 KG/M2 | OXYGEN SATURATION: 98 % | HEIGHT: 73 IN | DIASTOLIC BLOOD PRESSURE: 80 MMHG | WEIGHT: 215 LBS | HEART RATE: 62 BPM | TEMPERATURE: 98 F

## 2021-10-29 DIAGNOSIS — M75.100 ROTATOR CUFF TEAR: ICD-10-CM

## 2021-10-29 PROBLEM — M75.121 COMPLETE TEAR OF RIGHT ROTATOR CUFF: Status: ACTIVE | Noted: 2021-10-29

## 2021-10-29 PROCEDURE — 25000003 PHARM REV CODE 250: Performed by: ORTHOPAEDIC SURGERY

## 2021-10-29 PROCEDURE — 63600175 PHARM REV CODE 636 W HCPCS: Performed by: ANESTHESIOLOGY

## 2021-10-29 PROCEDURE — 71000016 HC POSTOP RECOV ADDL HR: Performed by: ORTHOPAEDIC SURGERY

## 2021-10-29 PROCEDURE — 36000710: Performed by: ORTHOPAEDIC SURGERY

## 2021-10-29 PROCEDURE — 25000003 PHARM REV CODE 250: Performed by: ANESTHESIOLOGY

## 2021-10-29 PROCEDURE — 36000711: Performed by: ORTHOPAEDIC SURGERY

## 2021-10-29 PROCEDURE — 71000033 HC RECOVERY, INTIAL HOUR: Performed by: ORTHOPAEDIC SURGERY

## 2021-10-29 PROCEDURE — 25000003 PHARM REV CODE 250: Performed by: NURSE ANESTHETIST, CERTIFIED REGISTERED

## 2021-10-29 PROCEDURE — 63600175 PHARM REV CODE 636 W HCPCS: Performed by: NURSE ANESTHETIST, CERTIFIED REGISTERED

## 2021-10-29 PROCEDURE — 63600175 PHARM REV CODE 636 W HCPCS: Performed by: ORTHOPAEDIC SURGERY

## 2021-10-29 PROCEDURE — 71000015 HC POSTOP RECOV 1ST HR: Performed by: ORTHOPAEDIC SURGERY

## 2021-10-29 PROCEDURE — 27201423 OPTIME MED/SURG SUP & DEVICES STERILE SUPPLY: Performed by: ORTHOPAEDIC SURGERY

## 2021-10-29 PROCEDURE — C1713 ANCHOR/SCREW BN/BN,TIS/BN: HCPCS | Performed by: ORTHOPAEDIC SURGERY

## 2021-10-29 PROCEDURE — 37000008 HC ANESTHESIA 1ST 15 MINUTES: Performed by: ORTHOPAEDIC SURGERY

## 2021-10-29 PROCEDURE — 37000009 HC ANESTHESIA EA ADD 15 MINS: Performed by: ORTHOPAEDIC SURGERY

## 2021-10-29 DEVICE — ANCHOR Y-KNOT PRO RC 3 2.8MM: Type: IMPLANTABLE DEVICE | Site: SHOULDER | Status: FUNCTIONAL

## 2021-10-29 RX ORDER — LIDOCAINE HCL/PF 100 MG/5ML
SYRINGE (ML) INTRAVENOUS
Status: DISCONTINUED | OUTPATIENT
Start: 2021-10-29 | End: 2021-10-29

## 2021-10-29 RX ORDER — ONDANSETRON 2 MG/ML
INJECTION INTRAMUSCULAR; INTRAVENOUS
Status: DISCONTINUED | OUTPATIENT
Start: 2021-10-29 | End: 2021-10-29

## 2021-10-29 RX ORDER — SODIUM CHLORIDE 0.9 % (FLUSH) 0.9 %
5 SYRINGE (ML) INJECTION
Status: DISCONTINUED | OUTPATIENT
Start: 2021-10-29 | End: 2021-10-29 | Stop reason: HOSPADM

## 2021-10-29 RX ORDER — SODIUM CHLORIDE 0.9 % (FLUSH) 0.9 %
3 SYRINGE (ML) INJECTION
Status: DISCONTINUED | OUTPATIENT
Start: 2021-10-29 | End: 2021-10-29 | Stop reason: HOSPADM

## 2021-10-29 RX ORDER — OXYCODONE HYDROCHLORIDE 5 MG/1
5 TABLET ORAL ONCE
Status: COMPLETED | OUTPATIENT
Start: 2021-10-29 | End: 2021-10-29

## 2021-10-29 RX ORDER — PROPOFOL 10 MG/ML
VIAL (ML) INTRAVENOUS
Status: DISCONTINUED | OUTPATIENT
Start: 2021-10-29 | End: 2021-10-29

## 2021-10-29 RX ORDER — NEOSTIGMINE METHYLSULFATE 1 MG/ML
INJECTION, SOLUTION INTRAVENOUS
Status: DISCONTINUED | OUTPATIENT
Start: 2021-10-29 | End: 2021-10-29

## 2021-10-29 RX ORDER — DEXAMETHASONE SODIUM PHOSPHATE 4 MG/ML
INJECTION, SOLUTION INTRA-ARTICULAR; INTRALESIONAL; INTRAMUSCULAR; INTRAVENOUS; SOFT TISSUE
Status: DISCONTINUED | OUTPATIENT
Start: 2021-10-29 | End: 2021-10-29

## 2021-10-29 RX ORDER — PHENYLEPHRINE HYDROCHLORIDE 10 MG/ML
INJECTION INTRAVENOUS
Status: DISCONTINUED | OUTPATIENT
Start: 2021-10-29 | End: 2021-10-29

## 2021-10-29 RX ORDER — ACETAMINOPHEN 500 MG
1000 TABLET ORAL
Status: COMPLETED | OUTPATIENT
Start: 2021-10-29 | End: 2021-10-29

## 2021-10-29 RX ORDER — SODIUM CHLORIDE, SODIUM LACTATE, POTASSIUM CHLORIDE, CALCIUM CHLORIDE 600; 310; 30; 20 MG/100ML; MG/100ML; MG/100ML; MG/100ML
INJECTION, SOLUTION INTRAVENOUS CONTINUOUS
Status: DISCONTINUED | OUTPATIENT
Start: 2021-10-29 | End: 2021-10-29 | Stop reason: HOSPADM

## 2021-10-29 RX ORDER — HYDROCODONE BITARTRATE AND ACETAMINOPHEN 10; 325 MG/1; MG/1
1 TABLET ORAL EVERY 6 HOURS PRN
Qty: 30 TABLET | Refills: 0 | Status: SHIPPED | OUTPATIENT
Start: 2021-10-29 | End: 2022-11-09

## 2021-10-29 RX ORDER — KETOROLAC TROMETHAMINE 30 MG/ML
INJECTION, SOLUTION INTRAMUSCULAR; INTRAVENOUS
Status: DISCONTINUED | OUTPATIENT
Start: 2021-10-29 | End: 2021-10-29

## 2021-10-29 RX ORDER — OXYCODONE HYDROCHLORIDE 5 MG/1
5 TABLET ORAL
Status: DISCONTINUED | OUTPATIENT
Start: 2021-10-29 | End: 2021-10-29 | Stop reason: HOSPADM

## 2021-10-29 RX ORDER — ROCURONIUM BROMIDE 10 MG/ML
INJECTION, SOLUTION INTRAVENOUS
Status: DISCONTINUED | OUTPATIENT
Start: 2021-10-29 | End: 2021-10-29

## 2021-10-29 RX ORDER — ROPIVACAINE HYDROCHLORIDE 5 MG/ML
INJECTION, SOLUTION EPIDURAL; INFILTRATION; PERINEURAL
Status: DISCONTINUED | OUTPATIENT
Start: 2021-10-29 | End: 2021-10-29 | Stop reason: HOSPADM

## 2021-10-29 RX ORDER — SODIUM CHLORIDE 9 MG/ML
INJECTION, SOLUTION INTRAVENOUS CONTINUOUS
Status: ACTIVE | OUTPATIENT
Start: 2021-10-29

## 2021-10-29 RX ORDER — CEFAZOLIN SODIUM 1 G/3ML
2 INJECTION, POWDER, FOR SOLUTION INTRAMUSCULAR; INTRAVENOUS
Status: COMPLETED | OUTPATIENT
Start: 2021-10-29 | End: 2021-10-29

## 2021-10-29 RX ORDER — HYDROMORPHONE HYDROCHLORIDE 2 MG/ML
0.4 INJECTION, SOLUTION INTRAMUSCULAR; INTRAVENOUS; SUBCUTANEOUS EVERY 5 MIN PRN
Status: DISCONTINUED | OUTPATIENT
Start: 2021-10-29 | End: 2021-10-29 | Stop reason: HOSPADM

## 2021-10-29 RX ORDER — DIPHENHYDRAMINE HYDROCHLORIDE 50 MG/ML
12.5 INJECTION INTRAMUSCULAR; INTRAVENOUS EVERY 30 MIN PRN
Status: DISCONTINUED | OUTPATIENT
Start: 2021-10-29 | End: 2021-10-29 | Stop reason: HOSPADM

## 2021-10-29 RX ORDER — EPINEPHRINE 1 MG/ML
INJECTION, SOLUTION INTRACARDIAC; INTRAMUSCULAR; INTRAVENOUS; SUBCUTANEOUS
Status: DISCONTINUED | OUTPATIENT
Start: 2021-10-29 | End: 2021-10-29 | Stop reason: HOSPADM

## 2021-10-29 RX ORDER — PROCHLORPERAZINE EDISYLATE 5 MG/ML
5 INJECTION INTRAMUSCULAR; INTRAVENOUS EVERY 30 MIN PRN
Status: DISCONTINUED | OUTPATIENT
Start: 2021-10-29 | End: 2021-10-29 | Stop reason: HOSPADM

## 2021-10-29 RX ORDER — FENTANYL CITRATE 50 UG/ML
INJECTION, SOLUTION INTRAMUSCULAR; INTRAVENOUS
Status: DISCONTINUED | OUTPATIENT
Start: 2021-10-29 | End: 2021-10-29

## 2021-10-29 RX ADMIN — FENTANYL CITRATE 100 MCG: 50 INJECTION, SOLUTION INTRAMUSCULAR; INTRAVENOUS at 11:10

## 2021-10-29 RX ADMIN — PROPOFOL 200 MG: 10 INJECTION, EMULSION INTRAVENOUS at 11:10

## 2021-10-29 RX ADMIN — CARBOXYMETHYLCELLULOSE SODIUM 2 DROP: 2.5 SOLUTION/ DROPS OPHTHALMIC at 11:10

## 2021-10-29 RX ADMIN — PHENYLEPHRINE HYDROCHLORIDE 200 MCG: 10 INJECTION INTRAVENOUS at 11:10

## 2021-10-29 RX ADMIN — NEOSTIGMINE METHYLSULFATE 10 MG: 1 INJECTION INTRAVENOUS at 12:10

## 2021-10-29 RX ADMIN — DEXAMETHASONE SODIUM PHOSPHATE 8 MG: 4 INJECTION, SOLUTION INTRAMUSCULAR; INTRAVENOUS at 11:10

## 2021-10-29 RX ADMIN — KETOROLAC TROMETHAMINE 30 MG: 30 INJECTION, SOLUTION INTRAMUSCULAR; INTRAVENOUS at 12:10

## 2021-10-29 RX ADMIN — LIDOCAINE HYDROCHLORIDE 50 MG: 20 INJECTION, SOLUTION INTRAVENOUS at 11:10

## 2021-10-29 RX ADMIN — HYDROMORPHONE HYDROCHLORIDE 0.4 MG: 2 INJECTION INTRAMUSCULAR; INTRAVENOUS; SUBCUTANEOUS at 01:10

## 2021-10-29 RX ADMIN — CEFAZOLIN 2 G: 330 INJECTION, POWDER, FOR SOLUTION INTRAMUSCULAR; INTRAVENOUS at 11:10

## 2021-10-29 RX ADMIN — GLYCOPYRROLATE 0.6 MG: 0.2 INJECTION, SOLUTION INTRAMUSCULAR; INTRAVITREAL at 12:10

## 2021-10-29 RX ADMIN — GLYCOPYRROLATE 0.2 MG: 0.2 INJECTION, SOLUTION INTRAMUSCULAR; INTRAVITREAL at 11:10

## 2021-10-29 RX ADMIN — ROCURONIUM BROMIDE 50 MG: 10 INJECTION, SOLUTION INTRAVENOUS at 11:10

## 2021-10-29 RX ADMIN — ONDANSETRON HYDROCHLORIDE 4 MG: 2 INJECTION INTRAMUSCULAR; INTRAVENOUS at 12:10

## 2021-10-29 RX ADMIN — ACETAMINOPHEN 1000 MG: 500 TABLET, FILM COATED ORAL at 10:10

## 2021-10-29 RX ADMIN — OXYCODONE 5 MG: 5 TABLET ORAL at 02:10

## 2021-10-29 RX ADMIN — OXYCODONE 5 MG: 5 TABLET ORAL at 01:10

## 2021-10-29 RX ADMIN — SODIUM CHLORIDE, SODIUM LACTATE, POTASSIUM CHLORIDE, AND CALCIUM CHLORIDE: 600; 310; 30; 20 INJECTION, SOLUTION INTRAVENOUS at 10:10

## 2021-10-29 RX ADMIN — PHENYLEPHRINE HYDROCHLORIDE 0.25 MCG/KG/MIN: 10 INJECTION INTRAVENOUS at 11:10

## 2022-01-07 DIAGNOSIS — S46.009A ROTATOR CUFF INJURY: Primary | ICD-10-CM

## 2022-01-07 DIAGNOSIS — S46.009A ROTATOR CUFF INJURY: ICD-10-CM

## 2022-01-13 ENCOUNTER — OFFICE VISIT (OUTPATIENT)
Dept: CARDIOLOGY | Facility: CLINIC | Age: 67
End: 2022-01-13
Attending: INTERNAL MEDICINE
Payer: MEDICARE

## 2022-01-13 VITALS
BODY MASS INDEX: 29.8 KG/M2 | HEIGHT: 73 IN | WEIGHT: 224.88 LBS | SYSTOLIC BLOOD PRESSURE: 140 MMHG | DIASTOLIC BLOOD PRESSURE: 77 MMHG | HEART RATE: 67 BPM

## 2022-01-13 DIAGNOSIS — I44.7 LEFT BUNDLE BRANCH BLOCK: ICD-10-CM

## 2022-01-13 DIAGNOSIS — E78.00 HYPERCHOLESTEROLEMIA: ICD-10-CM

## 2022-01-13 DIAGNOSIS — Z95.1 HISTORY OF CORONARY ARTERY BYPASS SURGERY: ICD-10-CM

## 2022-01-13 DIAGNOSIS — I25.10 CORONARY ARTERY DISEASE INVOLVING NATIVE CORONARY ARTERY OF NATIVE HEART WITHOUT ANGINA PECTORIS: ICD-10-CM

## 2022-01-13 DIAGNOSIS — Z87.898 HISTORY OF CHEST PAIN: ICD-10-CM

## 2022-01-13 DIAGNOSIS — G89.29 CHRONIC MIDLINE LOW BACK PAIN WITHOUT SCIATICA: ICD-10-CM

## 2022-01-13 DIAGNOSIS — I10 ESSENTIAL HYPERTENSION: ICD-10-CM

## 2022-01-13 DIAGNOSIS — R07.2 PRECORDIAL PAIN: ICD-10-CM

## 2022-01-13 DIAGNOSIS — E66.3 OVERWEIGHT: ICD-10-CM

## 2022-01-13 DIAGNOSIS — I10 PRIMARY HYPERTENSION: ICD-10-CM

## 2022-01-13 DIAGNOSIS — M54.50 CHRONIC MIDLINE LOW BACK PAIN WITHOUT SCIATICA: ICD-10-CM

## 2022-01-13 PROCEDURE — 1159F MED LIST DOCD IN RCRD: CPT | Mod: CPTII,S$GLB,, | Performed by: INTERNAL MEDICINE

## 2022-01-13 PROCEDURE — 3077F SYST BP >= 140 MM HG: CPT | Mod: CPTII,S$GLB,, | Performed by: INTERNAL MEDICINE

## 2022-01-13 PROCEDURE — 3078F DIAST BP <80 MM HG: CPT | Mod: CPTII,S$GLB,, | Performed by: INTERNAL MEDICINE

## 2022-01-13 PROCEDURE — 99999 PR PBB SHADOW E&M-EST. PATIENT-LVL III: ICD-10-PCS | Mod: PBBFAC,,, | Performed by: INTERNAL MEDICINE

## 2022-01-13 PROCEDURE — 3078F PR MOST RECENT DIASTOLIC BLOOD PRESSURE < 80 MM HG: ICD-10-PCS | Mod: CPTII,S$GLB,, | Performed by: INTERNAL MEDICINE

## 2022-01-13 PROCEDURE — 1160F PR REVIEW ALL MEDS BY PRESCRIBER/CLIN PHARMACIST DOCUMENTED: ICD-10-PCS | Mod: CPTII,S$GLB,, | Performed by: INTERNAL MEDICINE

## 2022-01-13 PROCEDURE — 3008F BODY MASS INDEX DOCD: CPT | Mod: CPTII,S$GLB,, | Performed by: INTERNAL MEDICINE

## 2022-01-13 PROCEDURE — 3008F PR BODY MASS INDEX (BMI) DOCUMENTED: ICD-10-PCS | Mod: CPTII,S$GLB,, | Performed by: INTERNAL MEDICINE

## 2022-01-13 PROCEDURE — 1159F PR MEDICATION LIST DOCUMENTED IN MEDICAL RECORD: ICD-10-PCS | Mod: CPTII,S$GLB,, | Performed by: INTERNAL MEDICINE

## 2022-01-13 PROCEDURE — 99214 PR OFFICE/OUTPT VISIT, EST, LEVL IV, 30-39 MIN: ICD-10-PCS | Mod: S$GLB,,, | Performed by: INTERNAL MEDICINE

## 2022-01-13 PROCEDURE — 99999 PR PBB SHADOW E&M-EST. PATIENT-LVL III: CPT | Mod: PBBFAC,,, | Performed by: INTERNAL MEDICINE

## 2022-01-13 PROCEDURE — 1160F RVW MEDS BY RX/DR IN RCRD: CPT | Mod: CPTII,S$GLB,, | Performed by: INTERNAL MEDICINE

## 2022-01-13 PROCEDURE — 99214 OFFICE O/P EST MOD 30 MIN: CPT | Mod: S$GLB,,, | Performed by: INTERNAL MEDICINE

## 2022-01-13 PROCEDURE — 3077F PR MOST RECENT SYSTOLIC BLOOD PRESSURE >= 140 MM HG: ICD-10-PCS | Mod: CPTII,S$GLB,, | Performed by: INTERNAL MEDICINE

## 2022-01-13 RX ORDER — ASPIRIN 81 MG/1
81 TABLET ORAL DAILY
Qty: 90 TABLET | Refills: 3 | Status: SHIPPED | OUTPATIENT
Start: 2022-01-13 | End: 2023-01-12 | Stop reason: SDUPTHER

## 2022-01-13 RX ORDER — ROSUVASTATIN CALCIUM 40 MG/1
40 TABLET, COATED ORAL DAILY
Qty: 90 TABLET | Refills: 3 | Status: SHIPPED | OUTPATIENT
Start: 2022-01-13 | End: 2023-01-12 | Stop reason: SDUPTHER

## 2022-01-13 RX ORDER — RAMIPRIL 10 MG/1
10 CAPSULE ORAL DAILY
Qty: 90 CAPSULE | Refills: 3 | Status: SHIPPED | OUTPATIENT
Start: 2022-01-13 | End: 2023-01-12 | Stop reason: SDUPTHER

## 2022-01-13 RX ORDER — EZETIMIBE 10 MG/1
10 TABLET ORAL DAILY
Qty: 90 TABLET | Refills: 3 | Status: SHIPPED | OUTPATIENT
Start: 2022-01-13 | End: 2023-01-12 | Stop reason: SDUPTHER

## 2022-01-13 RX ORDER — NITROGLYCERIN 0.4 MG/1
0.4 TABLET SUBLINGUAL EVERY 5 MIN PRN
Qty: 30 TABLET | Refills: 11 | Status: SHIPPED | OUTPATIENT
Start: 2022-01-13 | End: 2022-11-09

## 2022-01-13 NOTE — PROGRESS NOTES
Subjective:     Richie Johnson is a 66 y.o. male with hypertension and hypercholesterolemia. He is overweight. He has severe coronary artery disease and underwent coronary artery bypass grafting on 3/4/2013 receiving five grafts. He had some chest discomfort in 2016 and then had epigastric pain 6/2018. He has an intermittent left bundle branch block. In 11/2019 he began experience epigastric discomfort and left arm pain. Not clear exertional chest pain and no clear association with playing tennis. He may have had myalgia on statin in the past but appears to tolerate rosuvastatin well. In 2021 he has had some neck pain. No exertional dyspnea. No palpitations or weak spells. No issues with any of his prescribed medications. Feeling well overall.      Coronary Artery Disease  Presents for follow-up visit. The disease course has been stable. Pertinent negatives include no chest pain, chest pressure, chest tightness, dizziness, leg swelling, muscle weakness, palpitations, shortness of breath or weight gain. Risk factors include hyperlipidemia. Risk factors do not include decreased physical activity, diabetes, hypertension or obesity. The symptoms have been stable.   Chest Pain   This is a chronic problem. The current episode started more than 1 month ago. The problem has been resolved. The pain is at a severity of 2/10. Associated symptoms include back pain. Pertinent negatives include no abdominal pain, claudication, cough, diaphoresis, dizziness, exertional chest pressure, fever, headaches, hemoptysis, irregular heartbeat, leg pain, lower extremity edema, malaise/fatigue, nausea, near-syncope, numbness, orthopnea, palpitations, PND, shortness of breath, sputum production, syncope, vomiting or weakness.   His past medical history is significant for CAD and hyperlipidemia.   Pertinent negatives for past medical history include no diabetes, no hypertension and no muscle weakness.   Hypertension  The problem is  unchanged. The problem is uncontrolled (not followed at home). Associated symptoms include neck pain. Pertinent negatives include no anxiety, blurred vision, chest pain, headaches, malaise/fatigue, orthopnea, palpitations, PND, shortness of breath or sweats. There is no history of chronic renal disease.   Hyperlipidemia  This is a chronic problem. The current episode started more than 1 year ago. The problem is controlled. Recent lipid tests were reviewed and are normal. He has no history of chronic renal disease, diabetes, hypothyroidism, liver disease, obesity or nephrotic syndrome. Pertinent negatives include no chest pain, focal sensory loss, focal weakness, leg pain, myalgias or shortness of breath.       Review of Systems   Constitutional: Negative for diaphoresis, fever, malaise/fatigue and weight gain.   HENT: Negative for nosebleeds.    Eyes: Negative for blurred vision, double vision, pain, vision loss in left eye and vision loss in right eye.   Cardiovascular: Negative for chest pain, claudication, dyspnea on exertion, irregular heartbeat, leg swelling, near-syncope, orthopnea, palpitations, paroxysmal nocturnal dyspnea and syncope.   Respiratory: Negative for chest tightness, cough, hemoptysis, shortness of breath, sputum production and wheezing.    Endocrine: Negative for cold intolerance and heat intolerance.   Hematologic/Lymphatic: Negative for bleeding problem. Does not bruise/bleed easily.   Skin: Negative for color change and itching.   Musculoskeletal: Positive for back pain and neck pain. Negative for falls, muscle weakness and myalgias.   Gastrointestinal: Negative for abdominal pain, flatus, heartburn, hematemesis, hematochezia, hemorrhoids, jaundice, melena, nausea and vomiting.   Genitourinary: Negative for dysuria and hematuria.   Neurological: Negative for dizziness, focal weakness, headaches, light-headedness, loss of balance, numbness, sensory change, tremors and weakness.  "  Psychiatric/Behavioral: Negative for altered mental status, depression and memory loss. The patient is not nervous/anxious.    Allergic/Immunologic: Negative for hives and persistent infections.       Current Outpatient Medications on File Prior to Visit   Medication Sig Dispense Refill    aspirin (ECOTRIN) 81 MG EC tablet Take 1 tablet (81 mg total) by mouth once daily. 90 tablet 3    betamethasone dipropionate (DIPROLENE) 0.05 % cream as needed.   3    CALCIUM CARBONATE/VITAMIN D3 (VITAMIN D-3 ORAL) Take 2,000 Int'l Units by mouth once daily.      ezetimibe (ZETIA) 10 mg tablet Take 1 tablet (10 mg total) by mouth once daily. 90 tablet 3    ibuprofen (ADVIL,MOTRIN) 800 MG tablet Take 800 mg by mouth every 8 (eight) hours as needed.  0    multivitamin (THERAGRAN) per tablet Take 1 tablet by mouth once daily.      naproxen sodium (ANAPROX) 220 MG tablet Take 220 mg by mouth 2 (two) times daily with meals.      ramipriL (ALTACE) 10 MG capsule Take 1 capsule (10 mg total) by mouth once daily. 90 capsule 3    rosuvastatin (CRESTOR) 40 MG Tab Take 1 tablet (40 mg total) by mouth once daily. 90 tablet 3    UBIDECARENONE (COQ-10 ORAL) Take 300 mg by mouth once daily.       HYDROcodone-acetaminophen (NORCO)  mg per tablet Take 1 tablet by mouth every 6 (six) hours as needed for Pain. 30 tablet 0    nitroGLYCERIN (NITROSTAT) 0.4 MG SL tablet Place 1 tablet (0.4 mg total) under the tongue every 5 (five) minutes as needed for Chest pain (1 Tablet under the tongue PRN chest pain.). 30 tablet 11     Current Facility-Administered Medications on File Prior to Visit   Medication Dose Route Frequency Provider Last Rate Last Admin    0.9%  NaCl infusion   Intravenous Continuous Saul Gonzalez MD           BP (!) 140/77 (BP Location: Left arm, Patient Position: Sitting)   Pulse 67   Ht 6' 1" (1.854 m)   Wt 102 kg (224 lb 13.9 oz)   BMI 29.67 kg/m²       Objective:     Physical Exam  Constitutional:       " General: He is not in acute distress.     Appearance: Normal appearance. He is well-developed. He is not diaphoretic.   HENT:      Head: Normocephalic and atraumatic.   Eyes:      General: No scleral icterus.        Right eye: No discharge.         Left eye: No discharge.      Conjunctiva/sclera: Conjunctivae normal.   Neck:      Thyroid: No thyromegaly.      Vascular: No carotid bruit or JVD.   Cardiovascular:      Rate and Rhythm: Normal rate and regular rhythm.      Pulses:           Radial pulses are 2+ on the right side and 2+ on the left side.        Dorsalis pedis pulses are 2+ on the right side and 2+ on the left side.        Posterior tibial pulses are 2+ on the right side and 2+ on the left side.      Heart sounds: S1 normal and S2 normal. No murmur heard.  Gallop present. S4 sounds present. No S3 sounds.    Pulmonary:      Effort: Pulmonary effort is normal.      Breath sounds: Normal breath sounds.   Abdominal:      Palpations: Abdomen is soft.      Tenderness: There is no abdominal tenderness.   Musculoskeletal:      Cervical back: Neck supple.      Right ankle: No swelling, deformity or ecchymosis.      Left ankle: No swelling, deformity or ecchymosis.   Lymphadenopathy:      Head:      Right side of head: No submandibular adenopathy.      Left side of head: No submandibular adenopathy.      Cervical: No cervical adenopathy.   Skin:     General: Skin is warm and dry.      Findings: No rash.   Neurological:      General: No focal deficit present.      Mental Status: He is alert and oriented to person, place, and time.   Psychiatric:         Attention and Perception: Attention and perception normal.         Mood and Affect: Mood and affect normal.         Speech: Speech normal.         Behavior: Behavior normal.         Thought Content: Thought content normal.         Cognition and Memory: Cognition and memory normal.         Judgment: Judgment normal.         Assessment:     1. Coronary artery disease  "involving native coronary artery of native heart without angina pectoris    2. History of coronary artery bypass surgery    3. History of chest pain    4. Left bundle branch block    5. Primary hypertension    6. Hypercholesterolemia    7. Overweight    8. Chronic midline low back pain without sciatica        Plan:      1. Coronary Artery Disease   2/28/2013: [facility]: Cath: 3V CAD.    3/4/2013: [facility]: CABG x 5: LIMA to LAD, SVG1 to D1, SVG2 to RI & OM1 and SVG3 to RCA.   2/7/2020: Stress Echo: 10:00 min. No CP. ECG "negative". RBBB. Echo: "negative". Normal left ventricular size and systolic function.   On aspirin 81 mg Q24.   Appears stable.    2. History of Chest Pain   8/15/2016: Experienced chest pain.   8/30/2016: ECG: New LBBB.   10/20/2016: Echo: Normal left ventricular size and systolic function. Mild LVH. Moderate diastolic dysfunction. Mildly dilated LA.   10/20/2016: Regadenoson MPI: Small moderately dense fixed inferoapical defect.   10/26/2018: Echo: Normal left ventricular size and systolic function. Mild LVH. Mildly dilated LA.   6/2018: Episode of epigastric pain.   11/2019: Recurrent pain.   Appeared atypical.   Resolved.     3. Left Bundle Branch Block   8/30/2016: Diagnosed.   8/22/2018: ECG: QRS narrow.   2/6/2020: NSR. RBBB.   Intermittent left bundle branch block.   He has been totally asymptomatic.   In the absence of any symptoms, no bradycardia and good chronotropic response does not appear further evaluation is warranted at this time.    Will need close follow up of conduction disease    4. Hypertension   8/22/2018: 135/79 mmHg.   4/24/2019: 159/86 mmHg.   6/18/2020: 137/79 mmHg.   9/17/2020: 152/80 mmHg.   9/17/2020: Ramipril 5 mg Q24 was begun.    5/13/2021: Ramipril 5 mg Q24 was increased to ramipril 10 mg Q24.   On ramipril 10 mg Q24.   Not been keeping log at home.   Possibly high. Encouraged to keep log at home.     5. Hypercholesterolemia   5/16/2013: Chol 209. HDL 43. . TG " 164.   Was on atorvastatin 20 mg Q24 - unable to tolerate 40 mg Q24.   On rosuvastatin 40 mg Q24 and ezetimide 10 mg Q24.   12/10/2014: Chol 172. HDL 46. LDL 87. .   9/24/2015: Chol 148. HDL 50. LDL 78. TG 99.   9/20/2018: Chol 124. HDL 48. LDL 65. TG 54.   On rosuvastatin 40 mg Q24 and ezetimibe 10 mg Q24.   Tells me well controlled.    6. Overweight   8/22/2018: Weight 96 kg. BMI 28.   Encouraged to lose weight.    7. Back Pain   2012: Back Surgery.    8. Primary Care   Dr. Patricia Barrett.    F/u 6 months.    Tiffanie Hawk M.D.

## 2022-03-24 ENCOUNTER — OFFICE VISIT (OUTPATIENT)
Dept: INTERNAL MEDICINE | Facility: CLINIC | Age: 67
End: 2022-03-24
Payer: MEDICARE

## 2022-03-24 VITALS
HEART RATE: 79 BPM | DIASTOLIC BLOOD PRESSURE: 68 MMHG | WEIGHT: 222.44 LBS | BODY MASS INDEX: 29.48 KG/M2 | OXYGEN SATURATION: 95 % | HEIGHT: 73 IN | SYSTOLIC BLOOD PRESSURE: 136 MMHG

## 2022-03-24 DIAGNOSIS — I25.119 CORONARY ARTERY DISEASE INVOLVING NATIVE CORONARY ARTERY OF NATIVE HEART WITH ANGINA PECTORIS: ICD-10-CM

## 2022-03-24 DIAGNOSIS — Z12.5 SCREENING FOR MALIGNANT NEOPLASM OF PROSTATE: ICD-10-CM

## 2022-03-24 DIAGNOSIS — E78.00 HYPERCHOLESTEROLEMIA: ICD-10-CM

## 2022-03-24 DIAGNOSIS — Z00.00 ANNUAL PHYSICAL EXAM: Primary | ICD-10-CM

## 2022-03-24 DIAGNOSIS — Z91.89 AT RISK FOR BLEEDING: ICD-10-CM

## 2022-03-24 DIAGNOSIS — Z13.6 ENCOUNTER FOR LIPID SCREENING FOR CARDIOVASCULAR DISEASE: ICD-10-CM

## 2022-03-24 DIAGNOSIS — Z95.1 HISTORY OF CORONARY ARTERY BYPASS SURGERY: ICD-10-CM

## 2022-03-24 DIAGNOSIS — Z13.220 ENCOUNTER FOR LIPID SCREENING FOR CARDIOVASCULAR DISEASE: ICD-10-CM

## 2022-03-24 DIAGNOSIS — I10 PRIMARY HYPERTENSION: ICD-10-CM

## 2022-03-24 PROCEDURE — 99999 PR PBB SHADOW E&M-EST. PATIENT-LVL III: CPT | Mod: PBBFAC,,, | Performed by: STUDENT IN AN ORGANIZED HEALTH CARE EDUCATION/TRAINING PROGRAM

## 2022-03-24 PROCEDURE — 99999 PR PBB SHADOW E&M-EST. PATIENT-LVL III: ICD-10-PCS | Mod: PBBFAC,,, | Performed by: STUDENT IN AN ORGANIZED HEALTH CARE EDUCATION/TRAINING PROGRAM

## 2022-03-24 PROCEDURE — 1101F PT FALLS ASSESS-DOCD LE1/YR: CPT | Mod: CPTII,S$GLB,, | Performed by: STUDENT IN AN ORGANIZED HEALTH CARE EDUCATION/TRAINING PROGRAM

## 2022-03-24 PROCEDURE — 99387 PR PREVENTIVE VISIT,NEW,65 & OVER: ICD-10-PCS | Mod: S$GLB,,, | Performed by: STUDENT IN AN ORGANIZED HEALTH CARE EDUCATION/TRAINING PROGRAM

## 2022-03-24 PROCEDURE — 4010F ACE/ARB THERAPY RXD/TAKEN: CPT | Mod: CPTII,S$GLB,, | Performed by: STUDENT IN AN ORGANIZED HEALTH CARE EDUCATION/TRAINING PROGRAM

## 2022-03-24 PROCEDURE — 3288F PR FALLS RISK ASSESSMENT DOCUMENTED: ICD-10-PCS | Mod: CPTII,S$GLB,, | Performed by: STUDENT IN AN ORGANIZED HEALTH CARE EDUCATION/TRAINING PROGRAM

## 2022-03-24 PROCEDURE — 3008F PR BODY MASS INDEX (BMI) DOCUMENTED: ICD-10-PCS | Mod: CPTII,S$GLB,, | Performed by: STUDENT IN AN ORGANIZED HEALTH CARE EDUCATION/TRAINING PROGRAM

## 2022-03-24 PROCEDURE — 1101F PR PT FALLS ASSESS DOC 0-1 FALLS W/OUT INJ PAST YR: ICD-10-PCS | Mod: CPTII,S$GLB,, | Performed by: STUDENT IN AN ORGANIZED HEALTH CARE EDUCATION/TRAINING PROGRAM

## 2022-03-24 PROCEDURE — 99499 RISK ADDL DX/OHS AUDIT: ICD-10-PCS | Mod: S$GLB,,, | Performed by: STUDENT IN AN ORGANIZED HEALTH CARE EDUCATION/TRAINING PROGRAM

## 2022-03-24 PROCEDURE — 1126F AMNT PAIN NOTED NONE PRSNT: CPT | Mod: CPTII,S$GLB,, | Performed by: STUDENT IN AN ORGANIZED HEALTH CARE EDUCATION/TRAINING PROGRAM

## 2022-03-24 PROCEDURE — 3078F DIAST BP <80 MM HG: CPT | Mod: CPTII,S$GLB,, | Performed by: STUDENT IN AN ORGANIZED HEALTH CARE EDUCATION/TRAINING PROGRAM

## 2022-03-24 PROCEDURE — 3075F SYST BP GE 130 - 139MM HG: CPT | Mod: CPTII,S$GLB,, | Performed by: STUDENT IN AN ORGANIZED HEALTH CARE EDUCATION/TRAINING PROGRAM

## 2022-03-24 PROCEDURE — 3075F PR MOST RECENT SYSTOLIC BLOOD PRESS GE 130-139MM HG: ICD-10-PCS | Mod: CPTII,S$GLB,, | Performed by: STUDENT IN AN ORGANIZED HEALTH CARE EDUCATION/TRAINING PROGRAM

## 2022-03-24 PROCEDURE — 4010F PR ACE/ARB THEARPY RXD/TAKEN: ICD-10-PCS | Mod: CPTII,S$GLB,, | Performed by: STUDENT IN AN ORGANIZED HEALTH CARE EDUCATION/TRAINING PROGRAM

## 2022-03-24 PROCEDURE — 3008F BODY MASS INDEX DOCD: CPT | Mod: CPTII,S$GLB,, | Performed by: STUDENT IN AN ORGANIZED HEALTH CARE EDUCATION/TRAINING PROGRAM

## 2022-03-24 PROCEDURE — 99387 INIT PM E/M NEW PAT 65+ YRS: CPT | Mod: S$GLB,,, | Performed by: STUDENT IN AN ORGANIZED HEALTH CARE EDUCATION/TRAINING PROGRAM

## 2022-03-24 PROCEDURE — 1159F PR MEDICATION LIST DOCUMENTED IN MEDICAL RECORD: ICD-10-PCS | Mod: CPTII,S$GLB,, | Performed by: STUDENT IN AN ORGANIZED HEALTH CARE EDUCATION/TRAINING PROGRAM

## 2022-03-24 PROCEDURE — 1126F PR PAIN SEVERITY QUANTIFIED, NO PAIN PRESENT: ICD-10-PCS | Mod: CPTII,S$GLB,, | Performed by: STUDENT IN AN ORGANIZED HEALTH CARE EDUCATION/TRAINING PROGRAM

## 2022-03-24 PROCEDURE — 3288F FALL RISK ASSESSMENT DOCD: CPT | Mod: CPTII,S$GLB,, | Performed by: STUDENT IN AN ORGANIZED HEALTH CARE EDUCATION/TRAINING PROGRAM

## 2022-03-24 PROCEDURE — 3078F PR MOST RECENT DIASTOLIC BLOOD PRESSURE < 80 MM HG: ICD-10-PCS | Mod: CPTII,S$GLB,, | Performed by: STUDENT IN AN ORGANIZED HEALTH CARE EDUCATION/TRAINING PROGRAM

## 2022-03-24 PROCEDURE — 99499 UNLISTED E&M SERVICE: CPT | Mod: S$GLB,,, | Performed by: STUDENT IN AN ORGANIZED HEALTH CARE EDUCATION/TRAINING PROGRAM

## 2022-03-24 PROCEDURE — 1159F MED LIST DOCD IN RCRD: CPT | Mod: CPTII,S$GLB,, | Performed by: STUDENT IN AN ORGANIZED HEALTH CARE EDUCATION/TRAINING PROGRAM

## 2022-03-24 NOTE — PROGRESS NOTES
[facility]    Subjective:       Patient ID: Richie Johnson is a 66 y.o. male.    Chief Complaint: Annual Exam      History was obtained from the patient and supplemented through chart review.  This patient has not been seen by an [facility] internal medicine physician in the past 3 years and is new to me. Switching from Dr. Barrett ([facility]).     HPI:    Patient is a 66 y.o. male who presents to [facility] care.    HTN  Well-controlled  Cont ramipril 10    CAD s/p CABG  Dr. Hawk   No MI, taking ASA daily,    HLD  crestor 40 + coenzyme Q10    S/p rotator cuff surgery  Finished PT       Back pain chronic  After infection     3 ETOH drinks a day 5 days a week  3 glasses of water a day    Pt wishes to check PSA. Aware of risk of false positive    Medical History  Past Medical History:   Diagnosis Date    Back ache 4/2/2013    Back surgery 10/2012    Coronary atherosclerosis of native coronary artery 4/2/2013 2/28/2013. 3 V CAD. CABG 3/4/2013: BRUNSON LAD, SVG1 D1, SVG2 RI & OM1, SVG3 RCA    Hx of CABG 4/2/2013    Hypercholesterolemia 4/2/2013    Left bundle branch block 8/30/2016 8/30/2016: Diagnosed.    Overweight 8/22/2018 8/22/2018: Weight 96 kg. BMI 28.       Review of Systems   Constitutional: Negative for appetite change and diaphoresis.   HENT: Negative for trouble swallowing.    Respiratory: Negative for shortness of breath.    Cardiovascular: Negative for chest pain.   Gastrointestinal: Negative for diarrhea, nausea and vomiting.   Musculoskeletal: Negative for gait problem.   Neurological: Negative for dizziness and seizures.   Psychiatric/Behavioral: Negative for hallucinations.         Surgical hx, family hx, social hx   Have been reviewed      Current Outpatient Medications:     aspirin (ECOTRIN) 81 MG EC tablet, Take 1 tablet (81 mg total) by mouth once daily., Disp: 90 tablet, Rfl: 3    betamethasone dipropionate (DIPROLENE) 0.05 % cream, as needed. ,  "Disp: , Rfl: 3    CALCIUM CARBONATE/VITAMIN D3 (VITAMIN D-3 ORAL), Take 2,000 Int'l Units by mouth once daily., Disp: , Rfl:     ezetimibe (ZETIA) 10 mg tablet, Take 1 tablet (10 mg total) by mouth once daily., Disp: 90 tablet, Rfl: 3    multivitamin (THERAGRAN) per tablet, Take 1 tablet by mouth once daily., Disp: , Rfl:     naproxen sodium (ANAPROX) 220 MG tablet, Take 220 mg by mouth 2 (two) times daily with meals., Disp: , Rfl:     ramipriL (ALTACE) 10 MG capsule, Take 1 capsule (10 mg total) by mouth once daily., Disp: 90 capsule, Rfl: 3    rosuvastatin (CRESTOR) 40 MG Tab, Take 1 tablet (40 mg total) by mouth once daily., Disp: 90 tablet, Rfl: 3    UBIDECARENONE (COQ-10 ORAL), Take 300 mg by mouth once daily. , Disp: , Rfl:     HYDROcodone-acetaminophen (NORCO)  mg per tablet, Take 1 tablet by mouth every 6 (six) hours as needed for Pain. (Patient not taking: Reported on 3/24/2022), Disp: 30 tablet, Rfl: 0    ibuprofen (ADVIL,MOTRIN) 800 MG tablet, Take 800 mg by mouth every 8 (eight) hours as needed., Disp: , Rfl: 0    nitroGLYCERIN (NITROSTAT) 0.4 MG SL tablet, Place 1 tablet (0.4 mg total) under the tongue every 5 (five) minutes as needed for Chest pain. (Patient not taking: Reported on 3/24/2022), Disp: 30 tablet, Rfl: 11  No current facility-administered medications for this visit.    Facility-Administered Medications Ordered in Other Visits:     0.9%  NaCl infusion, , Intravenous, Continuous, Saul Gonzalez MD    Objective:        Body mass index is 29.35 kg/m².  Vitals:    03/24/22 1326   BP: 136/68   Pulse: 79   SpO2: 95%   Weight: 100.9 kg (222 lb 7.1 oz)   Height: 6' 1" (1.854 m)   PainSc: 0-No pain     Physical Exam  Vitals and nursing note reviewed.   Constitutional:       General: He is not in acute distress.     Appearance: Normal appearance. He is not ill-appearing.   HENT:      Head: Normocephalic and atraumatic.      Nose:      Comments: Wearing a mask  Eyes:      General: " No scleral icterus.  Pulmonary:      Effort: Pulmonary effort is normal.   Abdominal:      General: There is no distension.   Musculoskeletal:         General: No deformity.      Cervical back: Normal range of motion.   Skin:     General: Skin is warm and dry.   Neurological:      Mental Status: He is alert and oriented to person, place, and time.   Psychiatric:         Behavior: Behavior normal.           Lab Results   Component Value Date    WBC 6.2 05/16/2013    HGB 14.3 05/16/2013    HCT 42.9 05/16/2013     05/16/2013    CHOL 209 (H) 05/16/2013    TRIG 164 (H) 05/16/2013    HDL 43 05/16/2013    ALT 26 05/16/2013    AST 25 05/16/2013     05/16/2013    K 4.5 05/16/2013     05/16/2013    CREATININE 1.02 05/16/2013    BUN 18 05/16/2013    CO2 27 05/16/2013    PSA 1.54 10/24/2012    INR 1.1 10/31/2012       The ASCVD Risk score (Shahnaz FRANCO Jr., et al., 2013) failed to calculate for the following reasons:    Cannot find a previous HDL lab    Cannot find a previous total cholesterol lab    (Imaging have been independently reviewed)    Assessment:         1. Annual physical exam    2. Coronary artery disease involving native coronary artery of native heart with angina pectoris    3. Encounter for lipid screening for cardiovascular disease    4. Primary hypertension    5. Screening for malignant neoplasm of prostate    6. At risk for bleeding    7. History of coronary artery bypass surgery    8. Hypercholesterolemia          Plan:     Richie was seen today for annual exam.    Diagnoses and all orders for this visit:    Annual physical exam  -     Comprehensive Metabolic Panel; Future  -     Lipid Panel; Future    Coronary artery disease involving native coronary artery of native heart with angina pectoris    Encounter for lipid screening for cardiovascular disease  -     Lipid Panel; Future    Primary hypertension  -     Comprehensive Metabolic Panel; Future    Screening for malignant neoplasm of  prostate  -     PSA, Screening; Future    At risk for bleeding  -     CBC Auto Differential; Future    History of coronary artery bypass surgery    Hypercholesterolemia        Health Maintenance  - Lipids: ordered  - A1C: fasting glucose  - Colon Ca Screen:  Gets every 5 years; [facility]  - Immunizations: shingles vaccinated; covid vaccine and booster   - PSA ordered    Follow up in about 1 year (around 3/24/2023). or sooner prn        All medications were reviewed including potential side effects and risks/benefits.  Pt was counseled to call back if anything worsens or if questions arise.    Minh Means MD  Family Medicine  [facility]  [address] [address]  Phone [phone]  Fax [phone]

## 2022-03-25 DIAGNOSIS — Z12.11 COLON CANCER SCREENING: ICD-10-CM

## 2022-03-28 ENCOUNTER — PATIENT MESSAGE (OUTPATIENT)
Dept: ADMINISTRATIVE | Facility: HOSPITAL | Age: 67
End: 2022-03-28
Payer: MEDICARE

## 2022-03-30 ENCOUNTER — LAB VISIT (OUTPATIENT)
Dept: LAB | Facility: OTHER | Age: 67
End: 2022-03-30
Attending: STUDENT IN AN ORGANIZED HEALTH CARE EDUCATION/TRAINING PROGRAM
Payer: MEDICARE

## 2022-03-30 DIAGNOSIS — I10 PRIMARY HYPERTENSION: ICD-10-CM

## 2022-03-30 DIAGNOSIS — Z00.00 ANNUAL PHYSICAL EXAM: ICD-10-CM

## 2022-03-30 DIAGNOSIS — Z13.220 ENCOUNTER FOR LIPID SCREENING FOR CARDIOVASCULAR DISEASE: ICD-10-CM

## 2022-03-30 DIAGNOSIS — Z91.89 AT RISK FOR BLEEDING: ICD-10-CM

## 2022-03-30 DIAGNOSIS — Z12.5 SCREENING FOR MALIGNANT NEOPLASM OF PROSTATE: ICD-10-CM

## 2022-03-30 DIAGNOSIS — Z13.6 ENCOUNTER FOR LIPID SCREENING FOR CARDIOVASCULAR DISEASE: ICD-10-CM

## 2022-03-30 LAB
ALBUMIN SERPL BCP-MCNC: 4.1 G/DL (ref 3.5–5.2)
ALP SERPL-CCNC: 42 U/L (ref 55–135)
ALT SERPL W/O P-5'-P-CCNC: 56 U/L (ref 10–44)
ANION GAP SERPL CALC-SCNC: 11 MMOL/L (ref 8–16)
AST SERPL-CCNC: 75 U/L (ref 10–40)
BASOPHILS # BLD AUTO: 0.02 K/UL (ref 0–0.2)
BASOPHILS NFR BLD: 0.4 % (ref 0–1.9)
BILIRUB SERPL-MCNC: 0.5 MG/DL (ref 0.1–1)
BUN SERPL-MCNC: 25 MG/DL (ref 8–23)
CALCIUM SERPL-MCNC: 9.1 MG/DL (ref 8.7–10.5)
CHLORIDE SERPL-SCNC: 103 MMOL/L (ref 95–110)
CHOLEST SERPL-MCNC: 134 MG/DL (ref 120–199)
CHOLEST/HDLC SERPL: 2.6 {RATIO} (ref 2–5)
CO2 SERPL-SCNC: 25 MMOL/L (ref 23–29)
COMPLEXED PSA SERPL-MCNC: 2.6 NG/ML (ref 0–4)
CREAT SERPL-MCNC: 0.9 MG/DL (ref 0.5–1.4)
DIFFERENTIAL METHOD: ABNORMAL
EOSINOPHIL # BLD AUTO: 0.1 K/UL (ref 0–0.5)
EOSINOPHIL NFR BLD: 2.3 % (ref 0–8)
ERYTHROCYTE [DISTWIDTH] IN BLOOD BY AUTOMATED COUNT: 13.2 % (ref 11.5–14.5)
EST. GFR  (AFRICAN AMERICAN): >60 ML/MIN/1.73 M^2
EST. GFR  (NON AFRICAN AMERICAN): >60 ML/MIN/1.73 M^2
GLUCOSE SERPL-MCNC: 108 MG/DL (ref 70–110)
HCT VFR BLD AUTO: 40.9 % (ref 40–54)
HDLC SERPL-MCNC: 51 MG/DL (ref 40–75)
HDLC SERPL: 38.1 % (ref 20–50)
HGB BLD-MCNC: 13.7 G/DL (ref 14–18)
IMM GRANULOCYTES # BLD AUTO: 0.02 K/UL (ref 0–0.04)
IMM GRANULOCYTES NFR BLD AUTO: 0.4 % (ref 0–0.5)
LDLC SERPL CALC-MCNC: 73 MG/DL (ref 63–159)
LYMPHOCYTES # BLD AUTO: 1.4 K/UL (ref 1–4.8)
LYMPHOCYTES NFR BLD: 27.4 % (ref 18–48)
MCH RBC QN AUTO: 30.2 PG (ref 27–31)
MCHC RBC AUTO-ENTMCNC: 33.5 G/DL (ref 32–36)
MCV RBC AUTO: 90 FL (ref 82–98)
MONOCYTES # BLD AUTO: 0.5 K/UL (ref 0.3–1)
MONOCYTES NFR BLD: 10.1 % (ref 4–15)
NEUTROPHILS # BLD AUTO: 3.1 K/UL (ref 1.8–7.7)
NEUTROPHILS NFR BLD: 59.4 % (ref 38–73)
NONHDLC SERPL-MCNC: 83 MG/DL
NRBC BLD-RTO: 0 /100 WBC
PLATELET # BLD AUTO: 194 K/UL (ref 150–450)
PMV BLD AUTO: 9.9 FL (ref 9.2–12.9)
POTASSIUM SERPL-SCNC: 4.2 MMOL/L (ref 3.5–5.1)
PROT SERPL-MCNC: 6.3 G/DL (ref 6–8.4)
RBC # BLD AUTO: 4.53 M/UL (ref 4.6–6.2)
SODIUM SERPL-SCNC: 139 MMOL/L (ref 136–145)
TRIGL SERPL-MCNC: 50 MG/DL (ref 30–150)
WBC # BLD AUTO: 5.25 K/UL (ref 3.9–12.7)

## 2022-03-30 PROCEDURE — 80061 LIPID PANEL: CPT | Performed by: STUDENT IN AN ORGANIZED HEALTH CARE EDUCATION/TRAINING PROGRAM

## 2022-03-30 PROCEDURE — 85025 COMPLETE CBC W/AUTO DIFF WBC: CPT | Performed by: STUDENT IN AN ORGANIZED HEALTH CARE EDUCATION/TRAINING PROGRAM

## 2022-03-30 PROCEDURE — 80053 COMPREHEN METABOLIC PANEL: CPT | Performed by: STUDENT IN AN ORGANIZED HEALTH CARE EDUCATION/TRAINING PROGRAM

## 2022-03-30 PROCEDURE — 84153 ASSAY OF PSA TOTAL: CPT | Performed by: STUDENT IN AN ORGANIZED HEALTH CARE EDUCATION/TRAINING PROGRAM

## 2022-03-30 PROCEDURE — 36415 COLL VENOUS BLD VENIPUNCTURE: CPT | Performed by: STUDENT IN AN ORGANIZED HEALTH CARE EDUCATION/TRAINING PROGRAM

## 2022-04-14 DIAGNOSIS — R74.01 TRANSAMINITIS: Primary | ICD-10-CM

## 2022-04-18 ENCOUNTER — TELEPHONE (OUTPATIENT)
Dept: INTERNAL MEDICINE | Facility: CLINIC | Age: 67
End: 2022-04-18

## 2022-04-18 NOTE — TELEPHONE ENCOUNTER
----- Message from Minh Means MD sent at 4/14/2022  4:51 AM CDT -----  Please schedule repeat CMP for approx a month due to elevated liver enzyme levels

## 2022-05-30 ENCOUNTER — PATIENT MESSAGE (OUTPATIENT)
Dept: ADMINISTRATIVE | Facility: HOSPITAL | Age: 67
End: 2022-05-30
Payer: MEDICARE

## 2022-06-24 ENCOUNTER — PATIENT OUTREACH (OUTPATIENT)
Dept: FAMILY MEDICINE | Facility: CLINIC | Age: 67
End: 2022-06-24
Payer: MEDICARE

## 2022-06-24 NOTE — PROGRESS NOTES
[employer] was given to patient on 6/24/2022 9:41 AM       Daniela Cabrera  Nurse Clinical Care Coordinator  Internal Medicine  [facility]  [phone]

## 2022-07-02 ENCOUNTER — LAB VISIT (OUTPATIENT)
Dept: LAB | Facility: HOSPITAL | Age: 67
End: 2022-07-02
Payer: MEDICARE

## 2022-07-02 DIAGNOSIS — Z12.11 COLON CANCER SCREENING: ICD-10-CM

## 2022-07-02 PROCEDURE — 82274 ASSAY TEST FOR BLOOD FECAL: CPT | Performed by: STUDENT IN AN ORGANIZED HEALTH CARE EDUCATION/TRAINING PROGRAM

## 2022-07-07 ENCOUNTER — OFFICE VISIT (OUTPATIENT)
Dept: CARDIOLOGY | Facility: CLINIC | Age: 67
End: 2022-07-07
Attending: INTERNAL MEDICINE
Payer: MEDICARE

## 2022-07-07 VITALS
HEIGHT: 73 IN | WEIGHT: 212.75 LBS | DIASTOLIC BLOOD PRESSURE: 80 MMHG | BODY MASS INDEX: 28.2 KG/M2 | OXYGEN SATURATION: 96 % | SYSTOLIC BLOOD PRESSURE: 138 MMHG | HEART RATE: 60 BPM

## 2022-07-07 DIAGNOSIS — M54.50 CHRONIC MIDLINE LOW BACK PAIN WITHOUT SCIATICA: ICD-10-CM

## 2022-07-07 DIAGNOSIS — I25.10 CORONARY ARTERY DISEASE INVOLVING NATIVE CORONARY ARTERY OF NATIVE HEART WITHOUT ANGINA PECTORIS: ICD-10-CM

## 2022-07-07 DIAGNOSIS — G89.29 CHRONIC MIDLINE LOW BACK PAIN WITHOUT SCIATICA: ICD-10-CM

## 2022-07-07 DIAGNOSIS — Z87.898 HISTORY OF CHEST PAIN: ICD-10-CM

## 2022-07-07 DIAGNOSIS — E66.3 OVERWEIGHT: ICD-10-CM

## 2022-07-07 DIAGNOSIS — I44.7 LEFT BUNDLE BRANCH BLOCK: ICD-10-CM

## 2022-07-07 DIAGNOSIS — Z95.1 HISTORY OF CORONARY ARTERY BYPASS SURGERY: ICD-10-CM

## 2022-07-07 DIAGNOSIS — E78.00 HYPERCHOLESTEROLEMIA: ICD-10-CM

## 2022-07-07 DIAGNOSIS — I10 PRIMARY HYPERTENSION: ICD-10-CM

## 2022-07-07 LAB — HEMOCCULT STL QL IA: NEGATIVE

## 2022-07-07 PROCEDURE — 1160F PR REVIEW ALL MEDS BY PRESCRIBER/CLIN PHARMACIST DOCUMENTED: ICD-10-PCS | Mod: CPTII,S$GLB,, | Performed by: INTERNAL MEDICINE

## 2022-07-07 PROCEDURE — 3075F SYST BP GE 130 - 139MM HG: CPT | Mod: CPTII,S$GLB,, | Performed by: INTERNAL MEDICINE

## 2022-07-07 PROCEDURE — 1160F RVW MEDS BY RX/DR IN RCRD: CPT | Mod: CPTII,S$GLB,, | Performed by: INTERNAL MEDICINE

## 2022-07-07 PROCEDURE — 3008F BODY MASS INDEX DOCD: CPT | Mod: CPTII,S$GLB,, | Performed by: INTERNAL MEDICINE

## 2022-07-07 PROCEDURE — 93005 ELECTROCARDIOGRAM TRACING: CPT

## 2022-07-07 PROCEDURE — 4010F ACE/ARB THERAPY RXD/TAKEN: CPT | Mod: CPTII,S$GLB,, | Performed by: INTERNAL MEDICINE

## 2022-07-07 PROCEDURE — 93010 EKG 12-LEAD: ICD-10-PCS | Mod: S$GLB,,, | Performed by: INTERNAL MEDICINE

## 2022-07-07 PROCEDURE — 3079F PR MOST RECENT DIASTOLIC BLOOD PRESSURE 80-89 MM HG: ICD-10-PCS | Mod: CPTII,S$GLB,, | Performed by: INTERNAL MEDICINE

## 2022-07-07 PROCEDURE — 93010 ELECTROCARDIOGRAM REPORT: CPT | Mod: S$GLB,,, | Performed by: INTERNAL MEDICINE

## 2022-07-07 PROCEDURE — 99214 PR OFFICE/OUTPT VISIT, EST, LEVL IV, 30-39 MIN: ICD-10-PCS | Mod: 25,S$GLB,, | Performed by: INTERNAL MEDICINE

## 2022-07-07 PROCEDURE — 99999 PR PBB SHADOW E&M-EST. PATIENT-LVL III: ICD-10-PCS | Mod: PBBFAC,,, | Performed by: INTERNAL MEDICINE

## 2022-07-07 PROCEDURE — 3079F DIAST BP 80-89 MM HG: CPT | Mod: CPTII,S$GLB,, | Performed by: INTERNAL MEDICINE

## 2022-07-07 PROCEDURE — 99999 PR PBB SHADOW E&M-EST. PATIENT-LVL III: CPT | Mod: PBBFAC,,, | Performed by: INTERNAL MEDICINE

## 2022-07-07 PROCEDURE — 3008F PR BODY MASS INDEX (BMI) DOCUMENTED: ICD-10-PCS | Mod: CPTII,S$GLB,, | Performed by: INTERNAL MEDICINE

## 2022-07-07 PROCEDURE — 1159F PR MEDICATION LIST DOCUMENTED IN MEDICAL RECORD: ICD-10-PCS | Mod: CPTII,S$GLB,, | Performed by: INTERNAL MEDICINE

## 2022-07-07 PROCEDURE — 99214 OFFICE O/P EST MOD 30 MIN: CPT | Mod: 25,S$GLB,, | Performed by: INTERNAL MEDICINE

## 2022-07-07 PROCEDURE — 3075F PR MOST RECENT SYSTOLIC BLOOD PRESS GE 130-139MM HG: ICD-10-PCS | Mod: CPTII,S$GLB,, | Performed by: INTERNAL MEDICINE

## 2022-07-07 PROCEDURE — 1159F MED LIST DOCD IN RCRD: CPT | Mod: CPTII,S$GLB,, | Performed by: INTERNAL MEDICINE

## 2022-07-07 PROCEDURE — 4010F PR ACE/ARB THEARPY RXD/TAKEN: ICD-10-PCS | Mod: CPTII,S$GLB,, | Performed by: INTERNAL MEDICINE

## 2022-07-07 NOTE — PROGRESS NOTES
"  Subjective:     Richie Johnson is a 67 y.o. male with hypertension and hypercholesterolemia. He is overweight. He has severe coronary artery disease and underwent coronary artery bypass grafting on 3/4/2013 receiving five grafts. He had some chest discomfort in 2016 and then had epigastric pain 6/2018. He had an intermittent left bundle branch block. In 11/2019 he began experience epigastric discomfort and left arm pain. Not clear exertional chest pain and no clear association with playing tennis. On 2/7/2020 he underwent a stress echocardiogram and was able 10:00 minutes on the treadmill. There was no chest pain. The electrocardiogram was "negative" with a right bundle branch block. The echocardiogram was "negative" as well with normal left ventricular size and systolic function. He may have had myalgia on statin in the past but appears to tolerate rosuvastatin well. In 2021 he has had some neck pain. No exertional dyspnea. No palpitations or weak spells. No issues with any of his prescribed medications. Feeling well overall.      Coronary Artery Disease  Presents for follow-up visit. The disease course has been stable. Pertinent negatives include no chest pain, chest pressure, chest tightness, dizziness, leg swelling, muscle weakness, palpitations, shortness of breath or weight gain. Risk factors include hyperlipidemia. Risk factors do not include decreased physical activity, diabetes, hypertension or obesity. The symptoms have been stable.   Chest Pain   This is a chronic problem. The current episode started more than 1 month ago. The problem has been resolved. Associated symptoms include back pain. Pertinent negatives include no abdominal pain, claudication, cough, diaphoresis, dizziness, exertional chest pressure, fever, headaches, hemoptysis, irregular heartbeat, leg pain, lower extremity edema, malaise/fatigue, nausea, near-syncope, numbness, orthopnea, palpitations, PND, shortness of breath, sputum " production, syncope, vomiting or weakness.   His past medical history is significant for CAD and hyperlipidemia.   Pertinent negatives for past medical history include no diabetes, no hypertension and no muscle weakness.   Hypertension  The problem is unchanged. The problem is controlled (usually 120-130/60-75 mmHg at home). Associated symptoms include neck pain. Pertinent negatives include no anxiety, blurred vision, chest pain, headaches, malaise/fatigue, orthopnea, palpitations, PND, shortness of breath or sweats. There is no history of chronic renal disease.   Hyperlipidemia  This is a chronic problem. The current episode started more than 1 year ago. The problem is controlled. Recent lipid tests were reviewed and are normal. He has no history of chronic renal disease, diabetes, hypothyroidism, liver disease, obesity or nephrotic syndrome. Pertinent negatives include no chest pain, focal sensory loss, focal weakness, leg pain, myalgias or shortness of breath.       Review of Systems   Constitutional: Negative for diaphoresis, fever, malaise/fatigue and weight gain.   HENT: Negative for nosebleeds.    Eyes: Negative for blurred vision, double vision, pain, vision loss in left eye and vision loss in right eye.   Cardiovascular: Negative for chest pain, claudication, dyspnea on exertion, irregular heartbeat, leg swelling, near-syncope, orthopnea, palpitations, paroxysmal nocturnal dyspnea and syncope.   Respiratory: Negative for chest tightness, cough, hemoptysis, shortness of breath, sputum production and wheezing.    Endocrine: Negative for cold intolerance and heat intolerance.   Hematologic/Lymphatic: Negative for bleeding problem. Does not bruise/bleed easily.   Skin: Negative for color change and itching.   Musculoskeletal: Positive for back pain and neck pain. Negative for falls, muscle weakness and myalgias.   Gastrointestinal: Negative for abdominal pain, flatus, heartburn, hematemesis, hematochezia,  hemorrhoids, jaundice, melena, nausea and vomiting.   Genitourinary: Negative for dysuria and hematuria.   Neurological: Negative for dizziness, focal weakness, headaches, light-headedness, loss of balance, numbness, sensory change, tremors and weakness.   Psychiatric/Behavioral: Negative for altered mental status, depression and memory loss. The patient is not nervous/anxious.    Allergic/Immunologic: Negative for hives and persistent infections.       Current Outpatient Medications on File Prior to Visit   Medication Sig Dispense Refill    aspirin (ECOTRIN) 81 MG EC tablet Take 1 tablet (81 mg total) by mouth once daily. 90 tablet 3    betamethasone dipropionate (DIPROLENE) 0.05 % cream as needed.   3    CALCIUM CARBONATE/VITAMIN D3 (VITAMIN D-3 ORAL) Take 2,000 Int'l Units by mouth once daily.      ibuprofen (ADVIL,MOTRIN) 800 MG tablet Take 800 mg by mouth every 8 (eight) hours as needed.  0    multivitamin (THERAGRAN) per tablet Take 1 tablet by mouth once daily.      ramipriL (ALTACE) 10 MG capsule Take 1 capsule (10 mg total) by mouth once daily. 90 capsule 3    rosuvastatin (CRESTOR) 40 MG Tab Take 1 tablet (40 mg total) by mouth once daily. 90 tablet 3    UBIDECARENONE (COQ-10 ORAL) Take 300 mg by mouth once daily.       ezetimibe (ZETIA) 10 mg tablet Take 1 tablet (10 mg total) by mouth once daily. 90 tablet 3    HYDROcodone-acetaminophen (NORCO)  mg per tablet Take 1 tablet by mouth every 6 (six) hours as needed for Pain. (Patient not taking: No sig reported) 30 tablet 0    naproxen sodium (ANAPROX) 220 MG tablet Take 220 mg by mouth 2 (two) times daily with meals.      nitroGLYCERIN (NITROSTAT) 0.4 MG SL tablet Place 1 tablet (0.4 mg total) under the tongue every 5 (five) minutes as needed for Chest pain. (Patient not taking: No sig reported) 30 tablet 11     Current Facility-Administered Medications on File Prior to Visit   Medication Dose Route Frequency Provider Last Rate Last Admin  "   0.9%  NaCl infusion   Intravenous Continuous Saul Gonzalez MD           /80 (BP Location: Left arm, Patient Position: Sitting, BP Method: Large (Manual))   Pulse 60   Ht 6' 1" (1.854 m)   Wt 96.5 kg (212 lb 11.9 oz)   SpO2 96%   BMI 28.07 kg/m²       Objective:     Physical Exam  Constitutional:       General: He is not in acute distress.     Appearance: Normal appearance. He is well-developed. He is not diaphoretic.   HENT:      Head: Normocephalic and atraumatic.   Eyes:      General: No scleral icterus.        Right eye: No discharge.         Left eye: No discharge.      Conjunctiva/sclera: Conjunctivae normal.   Neck:      Thyroid: No thyromegaly.      Vascular: No carotid bruit or JVD.   Cardiovascular:      Rate and Rhythm: Normal rate and regular rhythm.      Pulses:           Radial pulses are 2+ on the right side and 2+ on the left side.        Dorsalis pedis pulses are 2+ on the right side and 2+ on the left side.        Posterior tibial pulses are 2+ on the right side and 2+ on the left side.      Heart sounds: S1 normal and S2 normal. No murmur heard.    Gallop present. S4 sounds present. No S3 sounds.   Pulmonary:      Effort: Pulmonary effort is normal.      Breath sounds: Normal breath sounds.   Abdominal:      Palpations: Abdomen is soft.      Tenderness: There is no abdominal tenderness.   Musculoskeletal:      Cervical back: Neck supple.      Right ankle: No swelling, deformity or ecchymosis.      Left ankle: No swelling, deformity or ecchymosis.   Lymphadenopathy:      Head:      Right side of head: No submandibular adenopathy.      Left side of head: No submandibular adenopathy.      Cervical: No cervical adenopathy.   Skin:     General: Skin is warm and dry.      Findings: No rash.   Neurological:      General: No focal deficit present.      Mental Status: He is alert and oriented to person, place, and time.   Psychiatric:         Attention and Perception: Attention and " "perception normal.         Mood and Affect: Mood and affect normal.         Speech: Speech normal.         Behavior: Behavior normal.         Thought Content: Thought content normal.         Cognition and Memory: Cognition and memory normal.         Judgment: Judgment normal.         Assessment:     1. Coronary artery disease involving native coronary artery of native heart without angina pectoris    2. History of coronary artery bypass surgery    3. History of chest pain    4. Left bundle branch block    5. Primary hypertension    6. Hypercholesterolemia    7. Overweight    8. Chronic midline low back pain without sciatica        Plan:      1. Coronary Artery Disease   2/28/2013: [facility]: Cath: 3V CAD.    3/4/2013: [facility]: CABG x 5: LIMA to LAD, SVG1 to D1, SVG2 to RI & OM1 and SVG3 to RCA.   2/7/2020: Stress Echo: 10:00 min. No CP. ECG "negative". RBBB. Echo: "negative". Normal left ventricular size and systolic function.   On aspirin 81 mg Q24.   Appears stable.    2. History of Chest Pain   8/15/2016: Experienced chest pain.   8/30/2016: ECG: New LBBB.   10/20/2016: Echo: Normal left ventricular size and systolic function. Mild LVH. Moderate diastolic dysfunction. Mildly dilated LA.   10/20/2016: Regadenoson MPI: Small moderately dense fixed inferoapical defect.   10/26/2018: Echo: Normal left ventricular size and systolic function. Mild LVH. Mildly dilated LA.   6/2018: Episode of epigastric pain.   11/2019: Recurrent pain.   Appeared atypical.   Resolved.     3. Left Bundle Branch Block   8/30/2016: Diagnosed with LBBB.   8/22/2018: ECG: QRS narrow.   2/6/2020: NSR. RBBB.   Intermittent left bundle branch block.   He has been totally asymptomatic.   In the absence of any symptoms, no bradycardia and good chronotropic response does not appear further evaluation is warranted at this time.    Will need close follow up of conduction disease    4. Hypertension   8/22/2018: 135/79 mmHg.   4/24/2019: 159/86 " mmHg.   6/18/2020: 137/79 mmHg.   9/17/2020: 152/80 mmHg.   9/17/2020: Ramipril 5 mg Q24 was begun.    5/13/2021: Ramipril 5 mg Q24 was increased to ramipril 10 mg Q24.   On ramipril 10 mg Q24.   Not been keeping log at home.   Possibly high. Encouraged to keep log at home.     5. Hypercholesterolemia   5/16/2013: Chol 209. HDL 43. . .   Was on atorvastatin 20 mg Q24 - unable to tolerate 40 mg Q24.   On rosuvastatin 40 mg Q24 and ezetimide 10 mg Q24.   12/10/2014: Chol 172. HDL 46. LDL 87. .   9/24/2015: Chol 148. HDL 50. LDL 78. TG 99.   9/20/2018: Chol 124. HDL 48. LDL 65. TG 54.   On rosuvastatin 40 mg Q24 and ezetimibe 10 mg Q24.   Tells me well controlled.    6. Overweight   8/22/2018: Weight 96 kg. BMI 28.   Encouraged to lose weight.    7. Back Pain   2012: Back Surgery.    8. Primary Care   Dr. Patricia Barrett.    F/u 6 months.    Tiffanie Hawk M.D.

## 2022-07-15 ENCOUNTER — PATIENT OUTREACH (OUTPATIENT)
Dept: INTERNAL MEDICINE | Facility: CLINIC | Age: 67
End: 2022-07-15
Payer: MEDICARE

## 2022-07-25 ENCOUNTER — PATIENT OUTREACH (OUTPATIENT)
Dept: INTERNAL MEDICINE | Facility: CLINIC | Age: 67
End: 2022-07-25
Payer: MEDICARE

## 2022-07-26 DIAGNOSIS — U07.1 COVID: Primary | ICD-10-CM

## 2022-07-26 NOTE — TELEPHONE ENCOUNTER
----- Message from Caroline Raza sent at 7/26/2022  8:31 AM CDT -----  Contact: SELIN MIRANDA [[id number]]  Type: Call Back      Who called: SELIN MIRANDA [[id number]]      What is the request in detail: Patient is requesting a call back. Pt states that he tested positive for COVID this morning. He would like to know if he can be placed on the medication He states that he has some congestion and sore throat. Please advise.       Can the clinic reply by [facility]? No      Would the patient rather a call back or a response via [facility]? Call back       Best call back number: [phone] (mobile)      Additional Information:

## 2022-07-26 NOTE — TELEPHONE ENCOUNTER
Returned pt's call.  Pt states he tested positive for Covid this morning with an at Home test. Symtpoms began on Sunday 7/24, but did not test positive before today.     C/O dry throat and congestion. Denies fever.    Notified patient of emergency prompts: Please go to the ER if severe or increasing shortness of breath, confusion, high fever, or chest pain.    Pt aware of isolation info and to hold statin for 5 days while on med and then 5 days after if given Paxlovid, and about Covid Home Monitoring.    Pended for PCP. Pt's last CMP 3/30/22 eGFR wnl and Covid Risk Score 4

## 2022-10-25 ENCOUNTER — PES CALL (OUTPATIENT)
Dept: ADMINISTRATIVE | Facility: CLINIC | Age: 67
End: 2022-10-25
Payer: MEDICARE

## 2022-11-08 ENCOUNTER — TELEPHONE (OUTPATIENT)
Dept: ADMINISTRATIVE | Facility: CLINIC | Age: 67
End: 2022-11-08
Payer: MEDICARE

## 2022-11-08 NOTE — TELEPHONE ENCOUNTER
Called pt, no answer; left message informing pt I was calling to remind pt of his in office EAWV on 11/8/22 and to return my call if he had any questions; left my name and number

## 2022-11-09 ENCOUNTER — OFFICE VISIT (OUTPATIENT)
Dept: INTERNAL MEDICINE | Facility: CLINIC | Age: 67
End: 2022-11-09
Payer: MEDICARE

## 2022-11-09 VITALS
SYSTOLIC BLOOD PRESSURE: 110 MMHG | HEART RATE: 63 BPM | WEIGHT: 207.31 LBS | DIASTOLIC BLOOD PRESSURE: 62 MMHG | BODY MASS INDEX: 29.02 KG/M2 | OXYGEN SATURATION: 97 % | HEIGHT: 71 IN

## 2022-11-09 DIAGNOSIS — I44.7 LEFT BUNDLE BRANCH BLOCK: ICD-10-CM

## 2022-11-09 DIAGNOSIS — Z87.898 HISTORY OF CHEST PAIN: ICD-10-CM

## 2022-11-09 DIAGNOSIS — Z95.1 HISTORY OF CORONARY ARTERY BYPASS SURGERY: ICD-10-CM

## 2022-11-09 DIAGNOSIS — Z00.00 ENCOUNTER FOR PREVENTIVE HEALTH EXAMINATION: Primary | ICD-10-CM

## 2022-11-09 DIAGNOSIS — E78.00 HYPERCHOLESTEROLEMIA: ICD-10-CM

## 2022-11-09 DIAGNOSIS — S46.011S TRAUMATIC COMPLETE TEAR OF RIGHT ROTATOR CUFF, SEQUELA: ICD-10-CM

## 2022-11-09 DIAGNOSIS — G89.29 CHRONIC MIDLINE LOW BACK PAIN WITHOUT SCIATICA: ICD-10-CM

## 2022-11-09 DIAGNOSIS — Z13.6 ENCOUNTER FOR ABDOMINAL AORTIC ANEURYSM SCREENING: ICD-10-CM

## 2022-11-09 DIAGNOSIS — I10 PRIMARY HYPERTENSION: ICD-10-CM

## 2022-11-09 DIAGNOSIS — E66.3 OVERWEIGHT: ICD-10-CM

## 2022-11-09 DIAGNOSIS — I25.10 CORONARY ARTERY DISEASE INVOLVING NATIVE CORONARY ARTERY OF NATIVE HEART WITHOUT ANGINA PECTORIS: ICD-10-CM

## 2022-11-09 DIAGNOSIS — M54.50 CHRONIC MIDLINE LOW BACK PAIN WITHOUT SCIATICA: ICD-10-CM

## 2022-11-09 PROCEDURE — 1126F PR PAIN SEVERITY QUANTIFIED, NO PAIN PRESENT: ICD-10-PCS | Mod: CPTII,S$GLB,, | Performed by: NURSE PRACTITIONER

## 2022-11-09 PROCEDURE — 3074F SYST BP LT 130 MM HG: CPT | Mod: CPTII,S$GLB,, | Performed by: NURSE PRACTITIONER

## 2022-11-09 PROCEDURE — 1170F FXNL STATUS ASSESSED: CPT | Mod: CPTII,S$GLB,, | Performed by: NURSE PRACTITIONER

## 2022-11-09 PROCEDURE — 1126F AMNT PAIN NOTED NONE PRSNT: CPT | Mod: CPTII,S$GLB,, | Performed by: NURSE PRACTITIONER

## 2022-11-09 PROCEDURE — 3074F PR MOST RECENT SYSTOLIC BLOOD PRESSURE < 130 MM HG: ICD-10-PCS | Mod: CPTII,S$GLB,, | Performed by: NURSE PRACTITIONER

## 2022-11-09 PROCEDURE — 1101F PR PT FALLS ASSESS DOC 0-1 FALLS W/OUT INJ PAST YR: ICD-10-PCS | Mod: CPTII,S$GLB,, | Performed by: NURSE PRACTITIONER

## 2022-11-09 PROCEDURE — 3008F BODY MASS INDEX DOCD: CPT | Mod: CPTII,S$GLB,, | Performed by: NURSE PRACTITIONER

## 2022-11-09 PROCEDURE — 1160F RVW MEDS BY RX/DR IN RCRD: CPT | Mod: CPTII,S$GLB,, | Performed by: NURSE PRACTITIONER

## 2022-11-09 PROCEDURE — 1170F PR FUNCTIONAL STATUS ASSESSED: ICD-10-PCS | Mod: CPTII,S$GLB,, | Performed by: NURSE PRACTITIONER

## 2022-11-09 PROCEDURE — 3288F FALL RISK ASSESSMENT DOCD: CPT | Mod: CPTII,S$GLB,, | Performed by: NURSE PRACTITIONER

## 2022-11-09 PROCEDURE — 1159F PR MEDICATION LIST DOCUMENTED IN MEDICAL RECORD: ICD-10-PCS | Mod: CPTII,S$GLB,, | Performed by: NURSE PRACTITIONER

## 2022-11-09 PROCEDURE — 3078F DIAST BP <80 MM HG: CPT | Mod: CPTII,S$GLB,, | Performed by: NURSE PRACTITIONER

## 2022-11-09 PROCEDURE — 3078F PR MOST RECENT DIASTOLIC BLOOD PRESSURE < 80 MM HG: ICD-10-PCS | Mod: CPTII,S$GLB,, | Performed by: NURSE PRACTITIONER

## 2022-11-09 PROCEDURE — 3008F PR BODY MASS INDEX (BMI) DOCUMENTED: ICD-10-PCS | Mod: CPTII,S$GLB,, | Performed by: NURSE PRACTITIONER

## 2022-11-09 PROCEDURE — 3288F PR FALLS RISK ASSESSMENT DOCUMENTED: ICD-10-PCS | Mod: CPTII,S$GLB,, | Performed by: NURSE PRACTITIONER

## 2022-11-09 PROCEDURE — 1159F MED LIST DOCD IN RCRD: CPT | Mod: CPTII,S$GLB,, | Performed by: NURSE PRACTITIONER

## 2022-11-09 PROCEDURE — 1101F PT FALLS ASSESS-DOCD LE1/YR: CPT | Mod: CPTII,S$GLB,, | Performed by: NURSE PRACTITIONER

## 2022-11-09 PROCEDURE — 4010F PR ACE/ARB THEARPY RXD/TAKEN: ICD-10-PCS | Mod: CPTII,S$GLB,, | Performed by: NURSE PRACTITIONER

## 2022-11-09 PROCEDURE — 4010F ACE/ARB THERAPY RXD/TAKEN: CPT | Mod: CPTII,S$GLB,, | Performed by: NURSE PRACTITIONER

## 2022-11-09 PROCEDURE — G0439 PPPS, SUBSEQ VISIT: HCPCS | Mod: S$GLB,,, | Performed by: NURSE PRACTITIONER

## 2022-11-09 PROCEDURE — 1160F PR REVIEW ALL MEDS BY PRESCRIBER/CLIN PHARMACIST DOCUMENTED: ICD-10-PCS | Mod: CPTII,S$GLB,, | Performed by: NURSE PRACTITIONER

## 2022-11-09 PROCEDURE — G0439 PR MEDICARE ANNUAL WELLNESS SUBSEQUENT VISIT: ICD-10-PCS | Mod: S$GLB,,, | Performed by: NURSE PRACTITIONER

## 2022-11-09 PROCEDURE — 99999 PR PBB SHADOW E&M-EST. PATIENT-LVL IV: CPT | Mod: PBBFAC,,, | Performed by: NURSE PRACTITIONER

## 2022-11-09 PROCEDURE — 99999 PR PBB SHADOW E&M-EST. PATIENT-LVL IV: ICD-10-PCS | Mod: PBBFAC,,, | Performed by: NURSE PRACTITIONER

## 2022-11-09 NOTE — PATIENT INSTRUCTIONS
Counseling and Referral of Other Preventative  (Italic type indicates deductible and co-insurance are waived)    Patient Name: Richie Johnson  Today's Date: 11/9/2022    Health Maintenance       Date Due Completion Date    TETANUS VACCINE Never done ---    Shingles Vaccine (1 of 2) Never done ---    Abdominal Aortic Aneurysm Screening Never done ---    Pneumococcal Vaccines (Age 65+) (2 - PCV) 09/30/2021 9/30/2020    High Dose Statin 07/07/2023 7/7/2022    Aspirin/Antiplatelet Therapy 07/07/2023 7/7/2022    Lipid Panel 03/30/2027 3/30/2022    Colorectal Cancer Screening 01/27/2028 7/7/2022        No orders of the defined types were placed in this encounter.      The following information is provided to all patients.  This information is to help you find resources for any of the problems found today that may be affecting your health:                Living healthy guide: www.[facility]      Understanding Diabetes: www.diabetes.org      Eating healthy: www.cdc.gov/healthyweight      [facility] home safety checklist: www.cdc.gov/steadi/patient.html      Agency on Aging: www.goea.[place]      Alcoholics anonymous (AA): www.aa.org      Physical Activity: www.beka.nih.gov/jk0mhkf      Tobacco use: www.quitwithusla.org

## 2022-11-09 NOTE — PROGRESS NOTES
"  Richie Johnson presented for a  Medicare AWV and comprehensive Health Risk Assessment today. The following components were reviewed and updated:    Medical history  Family History  Social history  Allergies and Current Medications  Health Risk Assessment  Health Maintenance  Care Team         ** See Completed Assessments for Annual Wellness Visit within the encounter summary.**         The following assessments were completed:  Living Situation  CAGE  Depression Screening  Timed Get Up and Go  Whisper Test  Cognitive Function Screening  Nutrition Screening  ADL Screening  PAQ Screening          Vitals:    11/09/22 1323   BP: 110/62   BP Location: Left arm   Patient Position: Sitting   Pulse: 63   SpO2: 97%   Weight: 94 kg (207 lb 5.5 oz)   Height: 5' 11" (1.803 m)     Body mass index is 28.92 kg/m².    Physical Exam  Vitals reviewed.   Constitutional:       Appearance: He is well-developed.   HENT:      Head: Normocephalic and atraumatic. Not macrocephalic and not microcephalic. No raccoon eyes, Vigil's sign, abrasion, contusion, right periorbital erythema, left periorbital erythema or laceration. Hair is normal.      Right Ear: No decreased hearing noted. No laceration, drainage, swelling or tenderness. No middle ear effusion. No foreign body. No mastoid tenderness. No hemotympanum. Tympanic membrane is not injected, scarred, perforated, erythematous, retracted or bulging. Tympanic membrane has normal mobility.      Left Ear: No decreased hearing noted. No laceration, drainage, swelling or tenderness.  No middle ear effusion. No foreign body. No mastoid tenderness. No hemotympanum. Tympanic membrane is not injected, scarred, perforated, erythematous, retracted or bulging. Tympanic membrane has normal mobility.      Nose: Nose normal. No nasal deformity, laceration or mucosal edema.      Mouth/Throat:      Pharynx: Uvula midline.   Eyes:      General: Lids are normal. No scleral icterus.     Conjunctiva/sclera: " Conjunctivae normal.   Neck:      Thyroid: No thyroid mass or thyromegaly.      Trachea: Trachea normal.   Cardiovascular:      Rate and Rhythm: Normal rate and regular rhythm.   Pulmonary:      Effort: Pulmonary effort is normal. No respiratory distress.      Breath sounds: Normal breath sounds.   Abdominal:      Palpations: Abdomen is soft.   Musculoskeletal:         General: Normal range of motion.      Cervical back: Neck supple. No edema or erythema. No spinous process tenderness or muscular tenderness. Normal range of motion.   Lymphadenopathy:      Head:      Right side of head: No submental, submandibular, tonsillar, preauricular or posterior auricular adenopathy.      Left side of head: No submental, submandibular, tonsillar, preauricular, posterior auricular or occipital adenopathy.   Skin:     General: Skin is warm and dry.   Neurological:      Mental Status: He is alert and oriented to person, place, and time.      Cranial Nerves: No cranial nerve deficit.      Sensory: No sensory deficit.   Psychiatric:         Behavior: Behavior normal.         Thought Content: Thought content normal.         Judgment: Judgment normal.           Diagnoses and health risks identified today and associated recommendations/orders:    1. Encounter for preventive health examination  Annual Health Risk Assessment (HRA) visit today.  Counseling and referral of health maintenance and preventative health measures performed.  Patient given annual wellness paperwork to take home.  Encouraged to return in 1 year for subsequent HRA visit.     2. Coronary artery disease involving native coronary artery of native heart without angina pectoris  Chronic. Stable. Continue current treatment plan as previously prescribed by PCP.    3. 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 Stable. Continue current treatment plan as previously prescribed by PCP.    4. Chronic midline low back pain without sciatica  Chronic. Stable.  Continue current treatment plan as previously prescribed by PCP.    5. History of chest pain  Chronic. Stable. Continue current treatment plan as previously prescribed by PCP.    6. History of coronary artery bypass surgery  Chronic. Stable. Continue current treatment plan as previously prescribed by PCP.    7. Hypercholesterolemia  Chronic. Stable. Continue current treatment plan as previously prescribed by PCP.    8. Primary hypertension  Chronic. Stable. Continue current treatment plan as previously prescribed by PCP.    9. Left bundle branch block  Chronic. Stable. Continue current treatment plan as previously prescribed by PCP.    10. Overweight  Chronic. Stable. Continue current treatment plan as previously prescribed by PCP.        Provided Richie with a 5-10 year written screening schedule and personal prevention plan. Recommendations were developed using the USPSTF age appropriate recommendations. Education, counseling, and referrals were provided as needed. After Visit Summary printed and given to patient which includes a list of additional screenings\tests needed.      I offered to discuss end of life issues, including information on how to make advance directives that the patient could use to name someone who would make medical decisions on their behalf if they became too ill to make themselves.    ___Patient declined  _X_Patient is interested, I provided paper work and offered to discuss.    No follow-ups on file.    Steven Davis NP  I offered to discuss advanced care planning, including how to pick a person who would make decisions for you if you were unable to make them for yourself, called a health care power of , and what kind of decisions you might make such as use of life sustaining treatments such as ventilators and tube feeding when faced with a life limiting illness recorded on a living will that they will need to know. (How you want to be cared for as you near the end of your  natural life)     X Patient is interested in learning more about how to make advanced directives.  I provided them paperwork and offered to discuss this with them.

## 2022-11-10 ENCOUNTER — IMMUNIZATION (OUTPATIENT)
Dept: PHARMACY | Facility: CLINIC | Age: 67
End: 2022-11-10
Payer: MEDICARE

## 2022-12-01 ENCOUNTER — HOSPITAL ENCOUNTER (OUTPATIENT)
Dept: RADIOLOGY | Facility: OTHER | Age: 67
Discharge: HOME | End: 2022-12-01
Attending: NURSE PRACTITIONER
Payer: MEDICARE

## 2022-12-01 DIAGNOSIS — Z13.6 ENCOUNTER FOR ABDOMINAL AORTIC ANEURYSM SCREENING: ICD-10-CM

## 2022-12-01 PROCEDURE — 76706 US ABDL AORTA SCREEN AAA: CPT | Mod: 26,,, | Performed by: RADIOLOGY

## 2022-12-01 PROCEDURE — 76706 US AAA SCREENING: ICD-10-PCS | Mod: 26,,, | Performed by: RADIOLOGY

## 2022-12-01 PROCEDURE — 76706 US ABDL AORTA SCREEN AAA: CPT | Mod: TC

## 2023-01-12 ENCOUNTER — OFFICE VISIT (OUTPATIENT)
Dept: CARDIOLOGY | Facility: CLINIC | Age: 68
End: 2023-01-12
Attending: INTERNAL MEDICINE
Payer: MEDICARE

## 2023-01-12 VITALS
WEIGHT: 211.31 LBS | SYSTOLIC BLOOD PRESSURE: 126 MMHG | BODY MASS INDEX: 29.58 KG/M2 | HEART RATE: 61 BPM | HEIGHT: 71 IN | OXYGEN SATURATION: 97 % | DIASTOLIC BLOOD PRESSURE: 66 MMHG

## 2023-01-12 DIAGNOSIS — E78.00 HYPERCHOLESTEROLEMIA: ICD-10-CM

## 2023-01-12 DIAGNOSIS — G89.29 CHRONIC MIDLINE LOW BACK PAIN WITHOUT SCIATICA: ICD-10-CM

## 2023-01-12 DIAGNOSIS — I10 PRIMARY HYPERTENSION: ICD-10-CM

## 2023-01-12 DIAGNOSIS — M54.50 CHRONIC MIDLINE LOW BACK PAIN WITHOUT SCIATICA: ICD-10-CM

## 2023-01-12 DIAGNOSIS — I25.10 CORONARY ARTERY DISEASE INVOLVING NATIVE CORONARY ARTERY OF NATIVE HEART WITHOUT ANGINA PECTORIS: ICD-10-CM

## 2023-01-12 DIAGNOSIS — Z87.898 HISTORY OF CHEST PAIN: ICD-10-CM

## 2023-01-12 DIAGNOSIS — I44.7 LEFT BUNDLE BRANCH BLOCK: ICD-10-CM

## 2023-01-12 DIAGNOSIS — E66.3 OVERWEIGHT: ICD-10-CM

## 2023-01-12 PROCEDURE — 3288F PR FALLS RISK ASSESSMENT DOCUMENTED: ICD-10-PCS | Mod: CPTII,S$GLB,, | Performed by: INTERNAL MEDICINE

## 2023-01-12 PROCEDURE — 1160F PR REVIEW ALL MEDS BY PRESCRIBER/CLIN PHARMACIST DOCUMENTED: ICD-10-PCS | Mod: CPTII,S$GLB,, | Performed by: INTERNAL MEDICINE

## 2023-01-12 PROCEDURE — 1101F PR PT FALLS ASSESS DOC 0-1 FALLS W/OUT INJ PAST YR: ICD-10-PCS | Mod: CPTII,S$GLB,, | Performed by: INTERNAL MEDICINE

## 2023-01-12 PROCEDURE — 99214 OFFICE O/P EST MOD 30 MIN: CPT | Mod: S$GLB,,, | Performed by: INTERNAL MEDICINE

## 2023-01-12 PROCEDURE — 3078F PR MOST RECENT DIASTOLIC BLOOD PRESSURE < 80 MM HG: ICD-10-PCS | Mod: CPTII,S$GLB,, | Performed by: INTERNAL MEDICINE

## 2023-01-12 PROCEDURE — 1159F MED LIST DOCD IN RCRD: CPT | Mod: CPTII,S$GLB,, | Performed by: INTERNAL MEDICINE

## 2023-01-12 PROCEDURE — 3008F BODY MASS INDEX DOCD: CPT | Mod: CPTII,S$GLB,, | Performed by: INTERNAL MEDICINE

## 2023-01-12 PROCEDURE — 1126F PR PAIN SEVERITY QUANTIFIED, NO PAIN PRESENT: ICD-10-PCS | Mod: CPTII,S$GLB,, | Performed by: INTERNAL MEDICINE

## 2023-01-12 PROCEDURE — 3288F FALL RISK ASSESSMENT DOCD: CPT | Mod: CPTII,S$GLB,, | Performed by: INTERNAL MEDICINE

## 2023-01-12 PROCEDURE — 3074F PR MOST RECENT SYSTOLIC BLOOD PRESSURE < 130 MM HG: ICD-10-PCS | Mod: CPTII,S$GLB,, | Performed by: INTERNAL MEDICINE

## 2023-01-12 PROCEDURE — 99214 PR OFFICE/OUTPT VISIT, EST, LEVL IV, 30-39 MIN: ICD-10-PCS | Mod: S$GLB,,, | Performed by: INTERNAL MEDICINE

## 2023-01-12 PROCEDURE — 1101F PT FALLS ASSESS-DOCD LE1/YR: CPT | Mod: CPTII,S$GLB,, | Performed by: INTERNAL MEDICINE

## 2023-01-12 PROCEDURE — 3074F SYST BP LT 130 MM HG: CPT | Mod: CPTII,S$GLB,, | Performed by: INTERNAL MEDICINE

## 2023-01-12 PROCEDURE — 1126F AMNT PAIN NOTED NONE PRSNT: CPT | Mod: CPTII,S$GLB,, | Performed by: INTERNAL MEDICINE

## 2023-01-12 PROCEDURE — 1159F PR MEDICATION LIST DOCUMENTED IN MEDICAL RECORD: ICD-10-PCS | Mod: CPTII,S$GLB,, | Performed by: INTERNAL MEDICINE

## 2023-01-12 PROCEDURE — 1160F RVW MEDS BY RX/DR IN RCRD: CPT | Mod: CPTII,S$GLB,, | Performed by: INTERNAL MEDICINE

## 2023-01-12 PROCEDURE — 3078F DIAST BP <80 MM HG: CPT | Mod: CPTII,S$GLB,, | Performed by: INTERNAL MEDICINE

## 2023-01-12 PROCEDURE — 3008F PR BODY MASS INDEX (BMI) DOCUMENTED: ICD-10-PCS | Mod: CPTII,S$GLB,, | Performed by: INTERNAL MEDICINE

## 2023-01-12 PROCEDURE — 99999 PR PBB SHADOW E&M-EST. PATIENT-LVL III: CPT | Mod: PBBFAC,,, | Performed by: INTERNAL MEDICINE

## 2023-01-12 PROCEDURE — 99999 PR PBB SHADOW E&M-EST. PATIENT-LVL III: ICD-10-PCS | Mod: PBBFAC,,, | Performed by: INTERNAL MEDICINE

## 2023-01-12 RX ORDER — RAMIPRIL 10 MG/1
10 CAPSULE ORAL DAILY
Qty: 90 CAPSULE | Refills: 3 | Status: SHIPPED | OUTPATIENT
Start: 2023-01-12 | End: 2023-11-30 | Stop reason: SDUPTHER

## 2023-01-12 RX ORDER — ROSUVASTATIN CALCIUM 40 MG/1
40 TABLET, COATED ORAL DAILY
Qty: 90 TABLET | Refills: 3 | Status: SHIPPED | OUTPATIENT
Start: 2023-01-12 | End: 2023-11-30 | Stop reason: SDUPTHER

## 2023-01-12 RX ORDER — EZETIMIBE 10 MG/1
10 TABLET ORAL DAILY
Qty: 90 TABLET | Refills: 3 | Status: SHIPPED | OUTPATIENT
Start: 2023-01-12 | End: 2023-11-30 | Stop reason: SDUPTHER

## 2023-01-12 RX ORDER — ASPIRIN 81 MG/1
81 TABLET ORAL DAILY
Qty: 90 TABLET | Refills: 3 | Status: SHIPPED | OUTPATIENT
Start: 2023-01-12 | End: 2023-11-30 | Stop reason: SDUPTHER

## 2023-01-12 NOTE — PROGRESS NOTES
"Subjective:     Richie Johnson is a 67 y.o.male with hypertension and hypercholesterolemia. He is overweight. He has severe coronary artery disease and underwent coronary artery bypass grafting on 3/4/2013 receiving five grafts. He had some chest discomfort in 2016 and then had epigastric pain 6/2018. He had an intermittent left bundle branch block. In 11/2019 he began experience epigastric discomfort and left arm pain. Not clear exertional chest pain and no clear association with playing tennis. On 2/7/2020 he underwent a stress echocardiogram and was able 10:00 minutes on the treadmill. There was no chest pain. The electrocardiogram was "negative" with a right bundle branch block. The echocardiogram was "negative" as well with normal left ventricular size and systolic function. He may have had myalgia on statin in the past but appears to tolerate rosuvastatin well. In 2021 he has had some neck pain. No exertional dyspnea. No palpitations or weak spells. No issues with any of his prescribed medications. Feeling well overall.      Coronary Artery Disease  Presents for follow-up visit. The disease course has been stable. The condition has lasted for  years. Pertinent negatives include no chest pain, chest pressure, chest tightness, dizziness, leg swelling, muscle weakness, palpitations, shortness of breath or weight gain. Risk factors include hyperlipidemia. Risk factors do not include decreased physical activity, diabetes, hypertension or obesity. The symptoms have been stable.   Chest Pain   This is a chronic problem. The current episode started more than 1 month ago. The problem has been resolved. Associated symptoms include back pain. Pertinent negatives include no abdominal pain, claudication, cough, diaphoresis, dizziness, exertional chest pressure, fever, headaches, hemoptysis, irregular heartbeat, leg pain, lower extremity edema, malaise/fatigue, nausea, near-syncope, numbness, orthopnea, palpitations, PND, " shortness of breath, sputum production, syncope, vomiting or weakness.   His past medical history is significant for CAD and hyperlipidemia.   Pertinent negatives for past medical history include no diabetes, no hypertension and no muscle weakness.   Hypertension  The problem is unchanged. The problem is controlled (usually 120-130/60-75 mmHg at home). Associated symptoms include neck pain. Pertinent negatives include no anxiety, blurred vision, chest pain, headaches, malaise/fatigue, orthopnea, palpitations, PND, shortness of breath or sweats. There is no history of chronic renal disease.   Hyperlipidemia  This is a chronic problem. The current episode started more than 1 year ago. The problem is controlled. Recent lipid tests were reviewed and are normal. He has no history of chronic renal disease, diabetes, hypothyroidism, liver disease, obesity or nephrotic syndrome. Pertinent negatives include no chest pain, focal sensory loss, focal weakness, leg pain, myalgias or shortness of breath.     Review of Systems   Constitutional: Negative for diaphoresis, fever, malaise/fatigue and weight gain.   HENT:  Negative for nosebleeds.    Eyes:  Negative for blurred vision, double vision, pain, vision loss in left eye and vision loss in right eye.   Cardiovascular:  Negative for chest pain, claudication, dyspnea on exertion, irregular heartbeat, leg swelling, near-syncope, orthopnea, palpitations, paroxysmal nocturnal dyspnea and syncope.   Respiratory:  Negative for chest tightness, cough, hemoptysis, shortness of breath, sputum production and wheezing.    Endocrine: Negative for cold intolerance and heat intolerance.   Hematologic/Lymphatic: Negative for bleeding problem. Does not bruise/bleed easily.   Skin:  Negative for color change and itching.   Musculoskeletal:  Positive for back pain and neck pain. Negative for falls, muscle weakness and myalgias.   Gastrointestinal:  Negative for abdominal pain, flatus, heartburn,  "hematemesis, hematochezia, hemorrhoids, jaundice, melena, nausea and vomiting.   Genitourinary:  Negative for dysuria and hematuria.   Neurological:  Negative for dizziness, focal weakness, headaches, light-headedness, loss of balance, numbness, sensory change, tremors and weakness.   Psychiatric/Behavioral:  Negative for altered mental status, depression and memory loss. The patient is not nervous/anxious.    Allergic/Immunologic: Negative for hives and persistent infections.       Current Outpatient Medications on File Prior to Visit   Medication Sig Dispense Refill    aspirin (ECOTRIN) 81 MG EC tablet Take 1 tablet (81 mg total) by mouth once daily. 90 tablet 3    betamethasone dipropionate (DIPROLENE) 0.05 % cream as needed.   3    CALCIUM CARBONATE/VITAMIN D3 (VITAMIN D-3 ORAL) Take 2,000 Int'l Units by mouth once daily.      ezetimibe (ZETIA) 10 mg tablet Take 1 tablet (10 mg total) by mouth once daily. 90 tablet 3    multivitamin (THERAGRAN) per tablet Take 1 tablet by mouth once daily.      naproxen sodium (ANAPROX) 220 MG tablet Take 220 mg by mouth 2 (two) times daily with meals.      ramipriL (ALTACE) 10 MG capsule Take 1 capsule (10 mg total) by mouth once daily. 90 capsule 3    rosuvastatin (CRESTOR) 40 MG Tab Take 1 tablet (40 mg total) by mouth once daily. 90 tablet 3    UBIDECARENONE (COQ-10 ORAL) Take 300 mg by mouth once daily.        Current Facility-Administered Medications on File Prior to Visit   Medication Dose Route Frequency Provider Last Rate Last Admin    0.9%  NaCl infusion   Intravenous Continuous Saul Gonzalez MD           /66 (BP Location: Right arm, Patient Position: Sitting, BP Method: Medium (Manual))   Pulse 61   Ht 5' 11" (1.803 m)   Wt 95.8 kg (211 lb 5 oz)   SpO2 97%   BMI 29.47 kg/m²       Objective:     Physical Exam  Constitutional:       General: He is not in acute distress.     Appearance: Normal appearance. He is well-developed. He is not diaphoretic. "   HENT:      Head: Normocephalic and atraumatic.   Eyes:      General: No scleral icterus.        Right eye: No discharge.         Left eye: No discharge.      Conjunctiva/sclera: Conjunctivae normal.   Neck:      Thyroid: No thyromegaly.      Vascular: No carotid bruit or JVD.   Cardiovascular:      Rate and Rhythm: Normal rate and regular rhythm.      Pulses:           Radial pulses are 2+ on the right side and 2+ on the left side.        Dorsalis pedis pulses are 2+ on the right side and 2+ on the left side.        Posterior tibial pulses are 2+ on the right side and 2+ on the left side.      Heart sounds: S1 normal and S2 normal. No murmur heard.    Gallop present. S4 sounds present. No S3 sounds.   Pulmonary:      Effort: Pulmonary effort is normal.      Breath sounds: Normal breath sounds.   Abdominal:      Palpations: Abdomen is soft.      Tenderness: There is no abdominal tenderness.   Musculoskeletal:      Cervical back: Neck supple.      Right ankle: No swelling, deformity or ecchymosis.      Left ankle: No swelling, deformity or ecchymosis.   Lymphadenopathy:      Head:      Right side of head: No submandibular adenopathy.      Left side of head: No submandibular adenopathy.      Cervical: No cervical adenopathy.   Skin:     General: Skin is warm and dry.      Findings: No rash.   Neurological:      General: No focal deficit present.      Mental Status: He is alert and oriented to person, place, and time.   Psychiatric:         Attention and Perception: Attention and perception normal.         Mood and Affect: Mood and affect normal.         Speech: Speech normal.         Behavior: Behavior normal.         Thought Content: Thought content normal.         Cognition and Memory: Cognition and memory normal.         Judgment: Judgment normal.       Assessment:     1. Coronary artery disease involving native coronary artery of native heart without angina pectoris    2. History of chest pain    3. Left bundle  "branch block    4. Primary hypertension    5. Hypercholesterolemia    6. Overweight    7. Chronic midline low back pain without sciatica        Plan:      1. Coronary Artery Disease   2/28/2013: [facility]: Cath: 3V CAD.    3/4/2013: [facility]: CABG x 5: LIMA to LAD, SVG1 to D1, SVG2 to RI & OM1 and SVG3 to RCA.   2/7/2020: Stress Echo: 10:00 min. No CP. ECG "negative". RBBB. Echo: "negative". Normal left ventricular size and systolic function.   On aspirin 81 mg Q24.   Appears stable.    2. History of Chest Pain   8/15/2016: Experienced chest pain.   8/30/2016: ECG: New LBBB.   10/20/2016: Echo: Normal left ventricular size and systolic function. Mild LVH. Moderate diastolic dysfunction. Mildly dilated LA.   10/20/2016: Regadenoson MPI: Small moderately dense fixed inferoapical defect.   10/26/2018: Echo: Normal left ventricular size and systolic function. Mild LVH. Mildly dilated LA.   6/2018: Episode of epigastric pain.   11/2019: Recurrent pain.   Appeared atypical.   Resolved.     3. Left Bundle Branch Block   8/30/2016: Diagnosed with LBBB.   8/22/2018: ECG: QRS narrow.   2/6/2020: NSR. RBBB.   Intermittent left bundle branch block.   He has been totally asymptomatic.   In the absence of any symptoms, no bradycardia and good chronotropic response does not appear further evaluation is warranted at this time.    Will need close follow up of conduction disease.   1/12/2023: Do Holter.    4. Hypertension   8/22/2018: 135/79 mmHg.   4/24/2019: 159/86 mmHg.   6/18/2020: 137/79 mmHg.   9/17/2020: 152/80 mmHg.   9/17/2020: Ramipril 5 mg Q24 was begun.    5/13/2021: Ramipril 5 mg Q24 was increased to ramipril 10 mg Q24.   On ramipril 10 mg Q24.   Keeping log at home.   Well controlled.     5. Hypercholesterolemia   5/16/2013: Chol 209. HDL 43. . .   Was on atorvastatin 20 mg Q24 - unable to tolerate 40 mg Q24.   On rosuvastatin 40 mg Q24 and ezetimide 10 mg Q24.   12/10/2014: Chol 172. HDL 46. LDL 87. TG " 196.   9/24/2015: Chol 148. HDL 50. LDL 78. TG 99.   9/20/2018: Chol 124. HDL 48. LDL 65. TG 54.   3/30/2022: Chol 134. HDL 51. LDL 73. TG 50.   On rosuvastatin 40 mg Q24 and ezetimibe 10 mg Q24.   Vet favorable lipid panel.    6. Overweight   8/22/2018: Weight 96 kg. BMI 28.   Encouraged to lose weight.    7. Back Pain   2012: Back Surgery.    8. Primary Care   Dr. Patricia Barrett.    F/u 6 months.    Tiffanie Hawk M.D.

## 2023-04-10 ENCOUNTER — TELEPHONE (OUTPATIENT)
Dept: INTERNAL MEDICINE | Facility: CLINIC | Age: 68
End: 2023-04-10
Payer: MEDICARE

## 2023-04-10 NOTE — TELEPHONE ENCOUNTER
----- Message from Michelle Dumas sent at 4/10/2023  9:30 AM CDT -----  Name of Who is Calling:SELIN MIRANDA [[id number]]              What is the request in detail:Requesting a call back to schedule annual appt. Also need to schedule second part of shingles shot.               Can the clinic reply by [facility]:              What Number to Call Back if not in [facility]:[phone]

## 2023-04-13 ENCOUNTER — PES CALL (OUTPATIENT)
Dept: ADMINISTRATIVE | Facility: CLINIC | Age: 68
End: 2023-04-13
Payer: MEDICARE

## 2023-06-07 ENCOUNTER — PATIENT MESSAGE (OUTPATIENT)
Dept: INTERNAL MEDICINE | Facility: CLINIC | Age: 68
End: 2023-06-07

## 2023-06-07 ENCOUNTER — OFFICE VISIT (OUTPATIENT)
Dept: INTERNAL MEDICINE | Facility: CLINIC | Age: 68
End: 2023-06-07
Payer: MEDICARE

## 2023-06-07 ENCOUNTER — TELEPHONE (OUTPATIENT)
Dept: INTERNAL MEDICINE | Facility: CLINIC | Age: 68
End: 2023-06-07

## 2023-06-07 ENCOUNTER — LAB VISIT (OUTPATIENT)
Dept: LAB | Facility: OTHER | Age: 68
End: 2023-06-07
Attending: STUDENT IN AN ORGANIZED HEALTH CARE EDUCATION/TRAINING PROGRAM
Payer: MEDICARE

## 2023-06-07 VITALS
SYSTOLIC BLOOD PRESSURE: 142 MMHG | HEIGHT: 71 IN | BODY MASS INDEX: 29.94 KG/M2 | HEART RATE: 55 BPM | OXYGEN SATURATION: 95 % | WEIGHT: 213.88 LBS | DIASTOLIC BLOOD PRESSURE: 76 MMHG

## 2023-06-07 DIAGNOSIS — Z91.89 AT RISK FOR SIDE EFFECT OF MEDICATION: ICD-10-CM

## 2023-06-07 DIAGNOSIS — Z12.5 SCREENING FOR MALIGNANT NEOPLASM OF PROSTATE: ICD-10-CM

## 2023-06-07 DIAGNOSIS — I25.10 CORONARY ARTERY DISEASE INVOLVING NATIVE CORONARY ARTERY OF NATIVE HEART WITHOUT ANGINA PECTORIS: ICD-10-CM

## 2023-06-07 DIAGNOSIS — Z91.89 AT RISK FOR BLEEDING: ICD-10-CM

## 2023-06-07 DIAGNOSIS — E78.00 HYPERCHOLESTEROLEMIA: ICD-10-CM

## 2023-06-07 DIAGNOSIS — I25.119 CORONARY ARTERY DISEASE INVOLVING NATIVE CORONARY ARTERY OF NATIVE HEART WITH ANGINA PECTORIS: ICD-10-CM

## 2023-06-07 DIAGNOSIS — R79.9 ABNORMAL FINDING OF BLOOD CHEMISTRY, UNSPECIFIED: ICD-10-CM

## 2023-06-07 DIAGNOSIS — Z95.1 HISTORY OF CORONARY ARTERY BYPASS SURGERY: ICD-10-CM

## 2023-06-07 DIAGNOSIS — R73.03 PREDIABETES: Primary | ICD-10-CM

## 2023-06-07 DIAGNOSIS — Z13.6 ENCOUNTER FOR LIPID SCREENING FOR CARDIOVASCULAR DISEASE: ICD-10-CM

## 2023-06-07 DIAGNOSIS — N50.811 TESTICULAR PAIN, RIGHT: ICD-10-CM

## 2023-06-07 DIAGNOSIS — Z00.00 ANNUAL PHYSICAL EXAM: ICD-10-CM

## 2023-06-07 DIAGNOSIS — E66.3 OVERWEIGHT: ICD-10-CM

## 2023-06-07 DIAGNOSIS — I10 PRIMARY HYPERTENSION: ICD-10-CM

## 2023-06-07 DIAGNOSIS — Z13.220 ENCOUNTER FOR LIPID SCREENING FOR CARDIOVASCULAR DISEASE: ICD-10-CM

## 2023-06-07 DIAGNOSIS — Z00.00 ANNUAL PHYSICAL EXAM: Primary | ICD-10-CM

## 2023-06-07 LAB
ALBUMIN SERPL BCP-MCNC: 4.3 G/DL (ref 3.5–5.2)
ALP SERPL-CCNC: 50 U/L (ref 55–135)
ALT SERPL W/O P-5'-P-CCNC: 35 U/L (ref 10–44)
ANION GAP SERPL CALC-SCNC: 11 MMOL/L (ref 8–16)
AST SERPL-CCNC: 34 U/L (ref 10–40)
BASOPHILS # BLD AUTO: 0.03 K/UL (ref 0–0.2)
BASOPHILS NFR BLD: 0.7 % (ref 0–1.9)
BILIRUB SERPL-MCNC: 0.4 MG/DL (ref 0.1–1)
BILIRUB UR QL STRIP: NEGATIVE
BUN SERPL-MCNC: 21 MG/DL (ref 8–23)
CALCIUM SERPL-MCNC: 9.5 MG/DL (ref 8.7–10.5)
CHLORIDE SERPL-SCNC: 105 MMOL/L (ref 95–110)
CHOLEST SERPL-MCNC: 151 MG/DL (ref 120–199)
CHOLEST/HDLC SERPL: 3 {RATIO} (ref 2–5)
CK SERPL-CCNC: 527 U/L (ref 20–200)
CLARITY UR: CLEAR
CO2 SERPL-SCNC: 24 MMOL/L (ref 23–29)
COLOR UR: YELLOW
COMPLEXED PSA SERPL-MCNC: 2.5 NG/ML (ref 0–4)
CREAT SERPL-MCNC: 0.9 MG/DL (ref 0.5–1.4)
DIFFERENTIAL METHOD: NORMAL
EOSINOPHIL # BLD AUTO: 0.1 K/UL (ref 0–0.5)
EOSINOPHIL NFR BLD: 2.1 % (ref 0–8)
ERYTHROCYTE [DISTWIDTH] IN BLOOD BY AUTOMATED COUNT: 12.9 % (ref 11.5–14.5)
EST. GFR  (NO RACE VARIABLE): >60 ML/MIN/1.73 M^2
ESTIMATED AVG GLUCOSE: 120 MG/DL (ref 68–131)
GLUCOSE SERPL-MCNC: 108 MG/DL (ref 70–110)
GLUCOSE UR QL STRIP: NEGATIVE
HBA1C MFR BLD: 5.8 % (ref 4–5.6)
HCT VFR BLD AUTO: 42.7 % (ref 40–54)
HDLC SERPL-MCNC: 50 MG/DL (ref 40–75)
HDLC SERPL: 33.1 % (ref 20–50)
HGB BLD-MCNC: 14.2 G/DL (ref 14–18)
HGB UR QL STRIP: NEGATIVE
IMM GRANULOCYTES # BLD AUTO: 0.02 K/UL (ref 0–0.04)
IMM GRANULOCYTES NFR BLD AUTO: 0.5 % (ref 0–0.5)
KETONES UR QL STRIP: NEGATIVE
LDLC SERPL CALC-MCNC: 84 MG/DL (ref 63–159)
LEUKOCYTE ESTERASE UR QL STRIP: NEGATIVE
LYMPHOCYTES # BLD AUTO: 1.4 K/UL (ref 1–4.8)
LYMPHOCYTES NFR BLD: 32.7 % (ref 18–48)
MCH RBC QN AUTO: 29.9 PG (ref 27–31)
MCHC RBC AUTO-ENTMCNC: 33.3 G/DL (ref 32–36)
MCV RBC AUTO: 90 FL (ref 82–98)
MONOCYTES # BLD AUTO: 0.4 K/UL (ref 0.3–1)
MONOCYTES NFR BLD: 8 % (ref 4–15)
NEUTROPHILS # BLD AUTO: 2.5 K/UL (ref 1.8–7.7)
NEUTROPHILS NFR BLD: 56 % (ref 38–73)
NITRITE UR QL STRIP: NEGATIVE
NONHDLC SERPL-MCNC: 101 MG/DL
NRBC BLD-RTO: 0 /100 WBC
PH UR STRIP: 6 [PH] (ref 5–8)
PLATELET # BLD AUTO: 175 K/UL (ref 150–450)
PMV BLD AUTO: 9.7 FL (ref 9.2–12.9)
POTASSIUM SERPL-SCNC: 4.3 MMOL/L (ref 3.5–5.1)
PROT SERPL-MCNC: 6.8 G/DL (ref 6–8.4)
PROT UR QL STRIP: NEGATIVE
RBC # BLD AUTO: 4.75 M/UL (ref 4.6–6.2)
SODIUM SERPL-SCNC: 140 MMOL/L (ref 136–145)
SP GR UR STRIP: 1.02 (ref 1–1.03)
TRIGL SERPL-MCNC: 85 MG/DL (ref 30–150)
URN SPEC COLLECT METH UR: NORMAL
WBC # BLD AUTO: 4.37 K/UL (ref 3.9–12.7)

## 2023-06-07 PROCEDURE — 1101F PR PT FALLS ASSESS DOC 0-1 FALLS W/OUT INJ PAST YR: ICD-10-PCS | Mod: CPTII,S$GLB,, | Performed by: STUDENT IN AN ORGANIZED HEALTH CARE EDUCATION/TRAINING PROGRAM

## 2023-06-07 PROCEDURE — 80053 COMPREHEN METABOLIC PANEL: CPT | Performed by: STUDENT IN AN ORGANIZED HEALTH CARE EDUCATION/TRAINING PROGRAM

## 2023-06-07 PROCEDURE — 83036 HEMOGLOBIN GLYCOSYLATED A1C: CPT | Performed by: STUDENT IN AN ORGANIZED HEALTH CARE EDUCATION/TRAINING PROGRAM

## 2023-06-07 PROCEDURE — 99215 OFFICE O/P EST HI 40 MIN: CPT | Mod: S$GLB,,, | Performed by: STUDENT IN AN ORGANIZED HEALTH CARE EDUCATION/TRAINING PROGRAM

## 2023-06-07 PROCEDURE — 1126F PR PAIN SEVERITY QUANTIFIED, NO PAIN PRESENT: ICD-10-PCS | Mod: CPTII,S$GLB,, | Performed by: STUDENT IN AN ORGANIZED HEALTH CARE EDUCATION/TRAINING PROGRAM

## 2023-06-07 PROCEDURE — 3288F FALL RISK ASSESSMENT DOCD: CPT | Mod: CPTII,S$GLB,, | Performed by: STUDENT IN AN ORGANIZED HEALTH CARE EDUCATION/TRAINING PROGRAM

## 2023-06-07 PROCEDURE — 3077F PR MOST RECENT SYSTOLIC BLOOD PRESSURE >= 140 MM HG: ICD-10-PCS | Mod: CPTII,S$GLB,, | Performed by: STUDENT IN AN ORGANIZED HEALTH CARE EDUCATION/TRAINING PROGRAM

## 2023-06-07 PROCEDURE — 80061 LIPID PANEL: CPT | Performed by: STUDENT IN AN ORGANIZED HEALTH CARE EDUCATION/TRAINING PROGRAM

## 2023-06-07 PROCEDURE — 81003 URINALYSIS AUTO W/O SCOPE: CPT | Performed by: STUDENT IN AN ORGANIZED HEALTH CARE EDUCATION/TRAINING PROGRAM

## 2023-06-07 PROCEDURE — 4010F ACE/ARB THERAPY RXD/TAKEN: CPT | Mod: CPTII,S$GLB,, | Performed by: STUDENT IN AN ORGANIZED HEALTH CARE EDUCATION/TRAINING PROGRAM

## 2023-06-07 PROCEDURE — 3288F PR FALLS RISK ASSESSMENT DOCUMENTED: ICD-10-PCS | Mod: CPTII,S$GLB,, | Performed by: STUDENT IN AN ORGANIZED HEALTH CARE EDUCATION/TRAINING PROGRAM

## 2023-06-07 PROCEDURE — 36415 COLL VENOUS BLD VENIPUNCTURE: CPT | Performed by: STUDENT IN AN ORGANIZED HEALTH CARE EDUCATION/TRAINING PROGRAM

## 2023-06-07 PROCEDURE — 3008F PR BODY MASS INDEX (BMI) DOCUMENTED: ICD-10-PCS | Mod: CPTII,S$GLB,, | Performed by: STUDENT IN AN ORGANIZED HEALTH CARE EDUCATION/TRAINING PROGRAM

## 2023-06-07 PROCEDURE — 3077F SYST BP >= 140 MM HG: CPT | Mod: CPTII,S$GLB,, | Performed by: STUDENT IN AN ORGANIZED HEALTH CARE EDUCATION/TRAINING PROGRAM

## 2023-06-07 PROCEDURE — 99999 PR PBB SHADOW E&M-EST. PATIENT-LVL IV: CPT | Mod: PBBFAC,,, | Performed by: STUDENT IN AN ORGANIZED HEALTH CARE EDUCATION/TRAINING PROGRAM

## 2023-06-07 PROCEDURE — 1159F MED LIST DOCD IN RCRD: CPT | Mod: CPTII,S$GLB,, | Performed by: STUDENT IN AN ORGANIZED HEALTH CARE EDUCATION/TRAINING PROGRAM

## 2023-06-07 PROCEDURE — 1159F PR MEDICATION LIST DOCUMENTED IN MEDICAL RECORD: ICD-10-PCS | Mod: CPTII,S$GLB,, | Performed by: STUDENT IN AN ORGANIZED HEALTH CARE EDUCATION/TRAINING PROGRAM

## 2023-06-07 PROCEDURE — 82550 ASSAY OF CK (CPK): CPT | Performed by: STUDENT IN AN ORGANIZED HEALTH CARE EDUCATION/TRAINING PROGRAM

## 2023-06-07 PROCEDURE — 1126F AMNT PAIN NOTED NONE PRSNT: CPT | Mod: CPTII,S$GLB,, | Performed by: STUDENT IN AN ORGANIZED HEALTH CARE EDUCATION/TRAINING PROGRAM

## 2023-06-07 PROCEDURE — 3008F BODY MASS INDEX DOCD: CPT | Mod: CPTII,S$GLB,, | Performed by: STUDENT IN AN ORGANIZED HEALTH CARE EDUCATION/TRAINING PROGRAM

## 2023-06-07 PROCEDURE — 84153 ASSAY OF PSA TOTAL: CPT | Performed by: STUDENT IN AN ORGANIZED HEALTH CARE EDUCATION/TRAINING PROGRAM

## 2023-06-07 PROCEDURE — 85025 COMPLETE CBC W/AUTO DIFF WBC: CPT | Performed by: STUDENT IN AN ORGANIZED HEALTH CARE EDUCATION/TRAINING PROGRAM

## 2023-06-07 PROCEDURE — 3078F PR MOST RECENT DIASTOLIC BLOOD PRESSURE < 80 MM HG: ICD-10-PCS | Mod: CPTII,S$GLB,, | Performed by: STUDENT IN AN ORGANIZED HEALTH CARE EDUCATION/TRAINING PROGRAM

## 2023-06-07 PROCEDURE — 99215 PR OFFICE/OUTPT VISIT, EST, LEVL V, 40-54 MIN: ICD-10-PCS | Mod: S$GLB,,, | Performed by: STUDENT IN AN ORGANIZED HEALTH CARE EDUCATION/TRAINING PROGRAM

## 2023-06-07 PROCEDURE — 4010F PR ACE/ARB THEARPY RXD/TAKEN: ICD-10-PCS | Mod: CPTII,S$GLB,, | Performed by: STUDENT IN AN ORGANIZED HEALTH CARE EDUCATION/TRAINING PROGRAM

## 2023-06-07 PROCEDURE — 3078F DIAST BP <80 MM HG: CPT | Mod: CPTII,S$GLB,, | Performed by: STUDENT IN AN ORGANIZED HEALTH CARE EDUCATION/TRAINING PROGRAM

## 2023-06-07 PROCEDURE — 99999 PR PBB SHADOW E&M-EST. PATIENT-LVL IV: ICD-10-PCS | Mod: PBBFAC,,, | Performed by: STUDENT IN AN ORGANIZED HEALTH CARE EDUCATION/TRAINING PROGRAM

## 2023-06-07 PROCEDURE — 1101F PT FALLS ASSESS-DOCD LE1/YR: CPT | Mod: CPTII,S$GLB,, | Performed by: STUDENT IN AN ORGANIZED HEALTH CARE EDUCATION/TRAINING PROGRAM

## 2023-06-07 NOTE — PROGRESS NOTES
"Subjective:       Richie Johnson is a 68 y.o. male who is a new patient who was referred by Dr. Minh Means  for evaluation of testicular pain.      He reports intermittent mild R scrotal pain. Described as ache, 1 out 10. Bilateral. No aggravating/alleviating factors. Present x 5 minutes, occurs daily. Denies LUTS. Denies family history of  malignancy.     MILA 6/23 - 5mm cyst in rete testis. No solid masses. Normal.     PSA 6/23 - 2.5      The following portions of the patient's history were reviewed and updated as appropriate: allergies, current medications, past family history, past medical history, past social history, past surgical history and problem list.    Review of Systems  Twelve point review of systems completed. Pertinent positive and negatives listed in HPI.      Objective:    Vitals: BP (!) 119/58 (BP Location: Right arm, Patient Position: Sitting, BP Method: Large (Automatic))   Pulse 64   Ht 5' 11" (1.803 m)   Wt 96.1 kg (211 lb 13.8 oz)   BMI 29.55 kg/m²     Physical Exam   General: well developed, well nourished in no acute distress  Head: normocephalic, atraumatic  Neck: supple, trachea midline, no obvious enlargement of thyroid  HEENT: EOMI, mucus membranes moist, sclera anicteric, no hearing impairment  Lungs: symmetric expansion, non-labored breathing  Neuro: alert and oriented x 3, no gross deficits  Psych: normal judgment and insight, normal mood/affect and non-anxious  Genitourinary:   Penis - uncircumcised penis without plaques, lesions, or scarring.  Urethra - orthotopic location without stenosis.  Scrotum  - no lesions or rashes, no hydrocele or hernia.  Testes - descended bilaterally, symmetric without masses, non tender.  Epididymides - no cysts or lesions, no spermatocele, symmetric.   VY: deferred      Lab Review   Urine analysis today in clinic shows no urine     Lab Results   Component Value Date    WBC 4.37 06/07/2023    HGB 14.2 06/07/2023    HCT 42.7 06/07/2023 "    MCV 90 06/07/2023     06/07/2023     Lab Results   Component Value Date    CREATININE 0.9 06/07/2023    BUN 21 06/07/2023     Lab Results   Component Value Date    PSA 2.5 06/07/2023     No results found for: PSADIAG    Imaging  MILA reviewed   Reports and images were personally reviewed by me and discussed with the patient today         Assessment/Plan:      1. Testicular pain, right    - MILA normal   - Mild, intermittent   - Ibuprofen, scrotal support, ice if worsens     2. PCa screening   - Cont PSAs with PCP    Follow up PRN

## 2023-06-07 NOTE — PROGRESS NOTES
[facility]    Subjective:       Patient ID: Richie Johnson is a 68 y.o. male.    Chief Complaint: Annual Exam      History was obtained from the patient and supplemented through chart review.  This patient is known to me. Prior was Dr. Barrett ([facility]).     HPI:    Patient is a 68 y.o. male w/ pmhx HTN, CAD s/p CABGwho presents for annual.    HTN  Well-controlled  Cont ramipril 10    Intermittent right scrotal ache  Mild and not constant  No concern for sti, no penile discharge, no swelling, no systemic symptoms  No hematuria, no changes in ejaculate      CAD s/p CABG  Dr. Hawk   No MI, taking ASA daily,    HLD  crestor 40 + coenzyme Q10    S/p rotator cuff surgery, knee pain    Following with ortho    Heavy etoh drinking, cutting back  Cut back Sunday through Tues  Down approx 1 drink a day on other day    Discussed R/B checking PSA    Wt Readings from Last 15 Encounters:   06/07/23 97 kg (213 lb 13.5 oz)   01/12/23 95.8 kg (211 lb 5 oz)   11/09/22 94 kg (207 lb 5.5 oz)   07/07/22 96.5 kg (212 lb 11.9 oz)   03/24/22 100.9 kg (222 lb 7.1 oz)   01/13/22 102 kg (224 lb 13.9 oz)   10/27/21 97.5 kg (215 lb)   10/26/21 97.5 kg (215 lb)   09/16/21 100 kg (220 lb 7.4 oz)   05/13/21 99 kg (218 lb 5.9 oz)   01/14/21 100.5 kg (221 lb 9 oz)   09/17/20 100.7 kg (221 lb 14.3 oz)   06/18/20 97.6 kg (215 lb 2.7 oz)   02/06/20 99.8 kg (220 lb)   08/15/19 98.4 kg (217 lb)        Medical History  Past Medical History:   Diagnosis Date    Back ache 4/2/2013    Back surgery 10/2012    Coronary atherosclerosis of native coronary artery 4/2/2013 2/28/2013. 3 V CAD. CABG 3/4/2013: [place] L[place], SVG1 D1, SVG2 RI & OM1, SVG3 RCA    Hx of CABG 4/2/2013    Hypercholesterolemia 4/2/2013    Left bundle branch block 8/30/2016 8/30/2016: Diagnosed.    Overweight 8/22/2018 8/22/2018: Weight 96 kg. BMI 28.       Review of Systems   Constitutional:  Negative for appetite change and diaphoresis.    HENT:  Negative for trouble swallowing.    Respiratory:  Negative for shortness of breath.    Cardiovascular:  Negative for chest pain.   Gastrointestinal:  Negative for diarrhea, nausea and vomiting.   Genitourinary:  Positive for testicular pain (right).   Musculoskeletal:  Negative for gait problem.   Neurological:  Negative for dizziness and seizures.   Psychiatric/Behavioral:  Negative for hallucinations.        Surgical hx, family hx, social hx   Have been reviewed      Current Outpatient Medications:     aspirin (ECOTRIN) 81 MG EC tablet, Take 1 tablet (81 mg total) by mouth once daily., Disp: 90 tablet, Rfl: 3    CALCIUM CARBONATE/VITAMIN D3 (VITAMIN D-3 ORAL), Take 2,000 Int'l Units by mouth once daily., Disp: , Rfl:     ezetimibe (ZETIA) 10 mg tablet, Take 1 tablet (10 mg total) by mouth once daily., Disp: 90 tablet, Rfl: 3    multivitamin (THERAGRAN) per tablet, Take 1 tablet by mouth once daily., Disp: , Rfl:     naproxen sodium (ANAPROX) 220 MG tablet, Take 220 mg by mouth 2 (two) times daily with meals., Disp: , Rfl:     ramipriL (ALTACE) 10 MG capsule, Take 1 capsule (10 mg total) by mouth once daily., Disp: 90 capsule, Rfl: 3    rosuvastatin (CRESTOR) 40 MG Tab, Take 1 tablet (40 mg total) by mouth once daily., Disp: 90 tablet, Rfl: 3    UBIDECARENONE (COQ-10 ORAL), Take 300 mg by mouth once daily. , Disp: , Rfl:     betamethasone dipropionate (DIPROLENE) 0.05 % cream, as needed. , Disp: , Rfl: 3    diphth,pertus,acell,,tetanus (BOOSTRIX TDAP) 2.5-8-5 Lf-mcg-Lf/0.5mL Syrg injection, Inject 0.5 mLs into the muscle once. For one dose. for 1 dose, Disp: 0.5 mL, Rfl: 0    pneumoc 20-migel conj-dip cr,PF, (PREVNAR 20, PF,) 0.5 mL Syrg injection, Inject into the muscle., Disp: 0.5 mL, Rfl: 0  No current facility-administered medications for this visit.    Facility-Administered Medications Ordered in Other Visits:     0.9%  NaCl infusion, , Intravenous, Continuous, Saul Gonzalez MD    Objective:       "  Body mass index is 29.83 kg/m².  Vitals:    06/07/23 0751 06/07/23 0834   BP: (!) 148/74 (!) 142/76   Pulse: (!) 55    SpO2: 95%    Weight: 97 kg (213 lb 13.5 oz)    Height: 5' 11" (1.803 m)    PainSc: 0-No pain      Physical Exam  Vitals and nursing note reviewed.   Constitutional:       General: He is not in acute distress.     Appearance: Normal appearance. He is not ill-appearing.   HENT:      Head: Normocephalic and atraumatic.   Eyes:      General: No scleral icterus.  Cardiovascular:      Rate and Rhythm: Normal rate and regular rhythm.      Heart sounds: Normal heart sounds.   Pulmonary:      Effort: Pulmonary effort is normal.   Abdominal:      General: There is no distension.   Genitourinary:     Penis: Normal.       Testes: Normal.      Comments: Non tender to palpitation, no mass, no swelling, no obvious hernia, though possible more so on left  Musculoskeletal:         General: No deformity.      Cervical back: Normal range of motion.   Skin:     General: Skin is warm and dry.   Neurological:      Mental Status: He is alert and oriented to person, place, and time.   Psychiatric:         Behavior: Behavior normal.         Lab Results   Component Value Date    WBC 4.37 06/07/2023    HGB 14.2 06/07/2023    HCT 42.7 06/07/2023     06/07/2023    CHOL 134 03/30/2022    TRIG 50 03/30/2022    HDL 51 03/30/2022    ALT 35 06/07/2023    AST 34 06/07/2023     06/07/2023    K 4.3 06/07/2023     06/07/2023    CREATININE 0.9 06/07/2023    BUN 21 06/07/2023    CO2 24 06/07/2023    PSA 2.6 03/30/2022    INR 1.1 10/31/2012       The 10-year ASCVD risk score (Bhupinder RIVERA, et al., 2019) is: 17.1%    Values used to calculate the score:      Age: 68 years      Sex: Male      Is Non- : No      Diabetic: No      Tobacco smoker: No      Systolic Blood Pressure: 142 mmHg      Is BP treated: Yes      HDL Cholesterol: 51 mg/dL      Total Cholesterol: 134 mg/dL    On statin    (Imaging have " been independently reviewed)    No AAA    Assessment:         1. Annual physical exam    2. Coronary artery disease involving native coronary artery of native heart with angina pectoris    3. At risk for bleeding    4. Encounter for lipid screening for cardiovascular disease    5. Hypercholesterolemia    6. Primary hypertension    7. Screening for malignant neoplasm of prostate    8. At risk for side effect of medication    9. Abnormal finding of blood chemistry, unspecified    10. Testicular pain, right    11. Coronary artery disease involving native coronary artery of native heart without angina pectoris    12. History of coronary artery bypass surgery    13. Overweight          Plan:     Richie was seen today for annual exam.    Diagnoses and all orders for this visit:    Annual physical exam  -     Comprehensive Metabolic Panel; Future  -     Lipid Panel; Future  -     CBC Auto Differential; Future  -     Hemoglobin A1C; Future  -     PSA, Screening; Future    Coronary artery disease involving native coronary artery of native heart with angina pectoris    At risk for bleeding    Encounter for lipid screening for cardiovascular disease    Hypercholesterolemia  -     Lipid Panel; Future    Primary hypertension  -     Comprehensive Metabolic Panel; Future    Screening for malignant neoplasm of prostate  -     PSA, Screening; Future    At risk for side effect of medication  -     CK; Future    Abnormal finding of blood chemistry, unspecified  -     CBC Auto Differential; Future  -     Hemoglobin A1C; Future    Testicular pain, right  -     US Scrotum And Testicles; Future  -     Urinalysis, Reflex to Urine Culture Urine, Clean Catch; Future  -     Ambulatory referral/consult to Urology; Future    Coronary artery disease involving native coronary artery of native heart without angina pectoris    History of coronary artery bypass surgery    Overweight        Health Maintenance  - Lipids: ordered  - A1C: fasting  glucose  - Colon Ca Screen: 2021 Dr. Valente 2021, due in 7 years, plans for just stool sample at that point  - Immunizations: shingles vaccinated; covid vaccine and booster   - PSA ordered    AAA neg    Follow up in about 6 months (around 12/7/2023). or sooner prn      46 min were used in chart review, evaluation and counseling of patient re: wellness and scrotal discomfort, documentation and review of results on same day of service     All medications were reviewed including potential side effects and risks/benefits.  Pt was counseled to call back if anything worsens or if questions arise.    Minh Means MD  Family Medicine  [facility]  [address]  [address]  [place] [address]  Phone [phone]  Fax [phone]

## 2023-06-09 ENCOUNTER — PATIENT MESSAGE (OUTPATIENT)
Dept: ADMINISTRATIVE | Facility: HOSPITAL | Age: 68
End: 2023-06-09
Payer: MEDICARE

## 2023-06-13 ENCOUNTER — HOSPITAL ENCOUNTER (OUTPATIENT)
Dept: RADIOLOGY | Facility: OTHER | Age: 68
Discharge: HOME | End: 2023-06-13
Attending: STUDENT IN AN ORGANIZED HEALTH CARE EDUCATION/TRAINING PROGRAM
Payer: MEDICARE

## 2023-06-13 DIAGNOSIS — N50.811 TESTICULAR PAIN, RIGHT: ICD-10-CM

## 2023-06-13 PROCEDURE — 76870 US SCROTUM AND TESTICLES: ICD-10-PCS | Mod: 26,,, | Performed by: RADIOLOGY

## 2023-06-13 PROCEDURE — 76870 US EXAM SCROTUM: CPT | Mod: TC

## 2023-06-13 PROCEDURE — 76870 US EXAM SCROTUM: CPT | Mod: 26,,, | Performed by: RADIOLOGY

## 2023-06-21 ENCOUNTER — PATIENT MESSAGE (OUTPATIENT)
Dept: ADMINISTRATIVE | Facility: HOSPITAL | Age: 68
End: 2023-06-21
Payer: MEDICARE

## 2023-06-21 ENCOUNTER — OFFICE VISIT (OUTPATIENT)
Dept: UROLOGY | Facility: CLINIC | Age: 68
End: 2023-06-21
Attending: UROLOGY
Payer: MEDICARE

## 2023-06-21 ENCOUNTER — TELEPHONE (OUTPATIENT)
Dept: INTERNAL MEDICINE | Facility: CLINIC | Age: 68
End: 2023-06-21

## 2023-06-21 ENCOUNTER — CLINICAL SUPPORT (OUTPATIENT)
Dept: INTERNAL MEDICINE | Facility: CLINIC | Age: 68
End: 2023-06-21
Payer: MEDICARE

## 2023-06-21 VITALS
BODY MASS INDEX: 29.66 KG/M2 | WEIGHT: 211.88 LBS | SYSTOLIC BLOOD PRESSURE: 119 MMHG | DIASTOLIC BLOOD PRESSURE: 58 MMHG | HEIGHT: 71 IN | HEART RATE: 64 BPM

## 2023-06-21 VITALS
SYSTOLIC BLOOD PRESSURE: 150 MMHG | DIASTOLIC BLOOD PRESSURE: 75 MMHG | SYSTOLIC BLOOD PRESSURE: 150 MMHG | DIASTOLIC BLOOD PRESSURE: 75 MMHG

## 2023-06-21 DIAGNOSIS — N50.811 TESTICULAR PAIN, RIGHT: Primary | ICD-10-CM

## 2023-06-21 PROCEDURE — 3074F SYST BP LT 130 MM HG: CPT | Mod: CPTII,S$GLB,, | Performed by: UROLOGY

## 2023-06-21 PROCEDURE — 3044F HG A1C LEVEL LT 7.0%: CPT | Mod: CPTII,S$GLB,, | Performed by: UROLOGY

## 2023-06-21 PROCEDURE — 3044F PR MOST RECENT HEMOGLOBIN A1C LEVEL <7.0%: ICD-10-PCS | Mod: CPTII,S$GLB,, | Performed by: UROLOGY

## 2023-06-21 PROCEDURE — 99999 PR PBB SHADOW E&M-EST. PATIENT-LVL II: CPT | Mod: PBBFAC,,,

## 2023-06-21 PROCEDURE — 1159F PR MEDICATION LIST DOCUMENTED IN MEDICAL RECORD: ICD-10-PCS | Mod: CPTII,S$GLB,, | Performed by: UROLOGY

## 2023-06-21 PROCEDURE — 3074F PR MOST RECENT SYSTOLIC BLOOD PRESSURE < 130 MM HG: ICD-10-PCS | Mod: CPTII,S$GLB,, | Performed by: UROLOGY

## 2023-06-21 PROCEDURE — 4010F PR ACE/ARB THEARPY RXD/TAKEN: ICD-10-PCS | Mod: CPTII,S$GLB,, | Performed by: UROLOGY

## 2023-06-21 PROCEDURE — 99203 PR OFFICE/OUTPT VISIT, NEW, LEVL III, 30-44 MIN: ICD-10-PCS | Mod: S$GLB,,, | Performed by: UROLOGY

## 2023-06-21 PROCEDURE — 3288F FALL RISK ASSESSMENT DOCD: CPT | Mod: CPTII,S$GLB,, | Performed by: UROLOGY

## 2023-06-21 PROCEDURE — 1160F PR REVIEW ALL MEDS BY PRESCRIBER/CLIN PHARMACIST DOCUMENTED: ICD-10-PCS | Mod: CPTII,S$GLB,, | Performed by: UROLOGY

## 2023-06-21 PROCEDURE — 3078F PR MOST RECENT DIASTOLIC BLOOD PRESSURE < 80 MM HG: ICD-10-PCS | Mod: CPTII,S$GLB,, | Performed by: UROLOGY

## 2023-06-21 PROCEDURE — 1101F PR PT FALLS ASSESS DOC 0-1 FALLS W/OUT INJ PAST YR: ICD-10-PCS | Mod: CPTII,S$GLB,, | Performed by: UROLOGY

## 2023-06-21 PROCEDURE — 4010F ACE/ARB THERAPY RXD/TAKEN: CPT | Mod: CPTII,S$GLB,, | Performed by: UROLOGY

## 2023-06-21 PROCEDURE — 1126F AMNT PAIN NOTED NONE PRSNT: CPT | Mod: CPTII,S$GLB,, | Performed by: UROLOGY

## 2023-06-21 PROCEDURE — 3008F BODY MASS INDEX DOCD: CPT | Mod: CPTII,S$GLB,, | Performed by: UROLOGY

## 2023-06-21 PROCEDURE — 3008F PR BODY MASS INDEX (BMI) DOCUMENTED: ICD-10-PCS | Mod: CPTII,S$GLB,, | Performed by: UROLOGY

## 2023-06-21 PROCEDURE — 1160F RVW MEDS BY RX/DR IN RCRD: CPT | Mod: CPTII,S$GLB,, | Performed by: UROLOGY

## 2023-06-21 PROCEDURE — 1159F MED LIST DOCD IN RCRD: CPT | Mod: CPTII,S$GLB,, | Performed by: UROLOGY

## 2023-06-21 PROCEDURE — 1101F PT FALLS ASSESS-DOCD LE1/YR: CPT | Mod: CPTII,S$GLB,, | Performed by: UROLOGY

## 2023-06-21 PROCEDURE — 3288F PR FALLS RISK ASSESSMENT DOCUMENTED: ICD-10-PCS | Mod: CPTII,S$GLB,, | Performed by: UROLOGY

## 2023-06-21 PROCEDURE — 99999 PR PBB SHADOW E&M-EST. PATIENT-LVL II: ICD-10-PCS | Mod: PBBFAC,,,

## 2023-06-21 PROCEDURE — 3078F DIAST BP <80 MM HG: CPT | Mod: CPTII,S$GLB,, | Performed by: UROLOGY

## 2023-06-21 PROCEDURE — 1126F PR PAIN SEVERITY QUANTIFIED, NO PAIN PRESENT: ICD-10-PCS | Mod: CPTII,S$GLB,, | Performed by: UROLOGY

## 2023-06-21 PROCEDURE — 99203 OFFICE O/P NEW LOW 30 MIN: CPT | Mod: S$GLB,,, | Performed by: UROLOGY

## 2023-06-21 NOTE — TELEPHONE ENCOUNTER
Richie Johnson 68 y.o. male is here for Blood Pressure check. remote 150/75  Is at work    Manual Blood pressure reading was  See Above  Pulse See Above. (Checked at the end of the visit)    If high, was it repeated after 15 minutes? no    Pt's Home blood pressure machine read in office NO Pulse No.     Diagnosed with Hypertension yes.    Patient took blood pressure medication today no.  Last dose of blood pressure medication was taken at Takes nightly- Last dose at 10 PM yesterday. Patient took Altace 10 mg daily.     All Medications and OTC medication updated yes    Does patient have record of home blood pressure readings / Blood Pressure Log yes. 130/63, 138/58    Does the pt have any complaints today in regards to their blood pressure medication? no. Complains of None. Patient is asymptomatic.   Were you sitting still for 5-10 minutes prior to taking your Blood pressure? yes   Has your blood pressure monitor ever been checked? no When was last time we checked your blood pressure monitor? No  Updated vitals yes  Follow up date is 12/07/2023.   Dr. Means notified.     Creatinine   Date Value Ref Range Status   06/07/2023 0.9 0.5 - 1.4 mg/dL Final     Sodium   Date Value Ref Range Status   06/07/2023 140 136 - 145 mmol/L Final     Potassium   Date Value Ref Range Status   06/07/2023 4.3 3.5 - 5.1 mmol/L Final

## 2023-06-21 NOTE — PROGRESS NOTES
Richie oJhnson 68 y.o. male is here for Blood Pressure check. remote 150/75  Is at work    Manual Blood pressure reading was  See Above  Pulse See Above. (Checked at the end of the visit)    If high, was it repeated after 15 minutes? no    Pt's Home blood pressure machine read in office NO Pulse No.     Diagnosed with Hypertension yes.    Patient took blood pressure medication today no.  Last dose of blood pressure medication was taken at Takes nightly- Last dose at 10 PM yesterday. Patient took Altace 10 mg daily.     All Medications and OTC medication updated yes    Does patient have record of home blood pressure readings / Blood Pressure Log yes. 130/63, 138/58    Does the pt have any complaints today in regards to their blood pressure medication? no. Complains of None. Patient is asymptomatic.   Were you sitting still for 5-10 minutes prior to taking your Blood pressure? yes   Has your blood pressure monitor ever been checked? no When was last time we checked your blood pressure monitor? No  Updated vitals yes  Follow up date is 12/07/2023.   Dr. Means notified.     Creatinine   Date Value Ref Range Status   06/07/2023 0.9 0.5 - 1.4 mg/dL Final     Sodium   Date Value Ref Range Status   06/07/2023 140 136 - 145 mmol/L Final     Potassium   Date Value Ref Range Status   06/07/2023 4.3 3.5 - 5.1 mmol/L Final

## 2023-07-13 ENCOUNTER — PATIENT MESSAGE (OUTPATIENT)
Dept: CARDIOLOGY | Facility: CLINIC | Age: 68
End: 2023-07-13

## 2023-07-13 ENCOUNTER — OFFICE VISIT (OUTPATIENT)
Dept: CARDIOLOGY | Facility: CLINIC | Age: 68
End: 2023-07-13
Attending: INTERNAL MEDICINE
Payer: MEDICARE

## 2023-07-13 VITALS
HEIGHT: 73 IN | BODY MASS INDEX: 28.43 KG/M2 | OXYGEN SATURATION: 97 % | HEART RATE: 58 BPM | DIASTOLIC BLOOD PRESSURE: 56 MMHG | WEIGHT: 214.5 LBS | SYSTOLIC BLOOD PRESSURE: 136 MMHG

## 2023-07-13 DIAGNOSIS — I25.10 CORONARY ARTERY DISEASE INVOLVING NATIVE CORONARY ARTERY OF NATIVE HEART WITHOUT ANGINA PECTORIS: ICD-10-CM

## 2023-07-13 DIAGNOSIS — I10 PRIMARY HYPERTENSION: ICD-10-CM

## 2023-07-13 DIAGNOSIS — Z87.898 HISTORY OF CHEST PAIN: ICD-10-CM

## 2023-07-13 DIAGNOSIS — I45.10 RIGHT BUNDLE BRANCH BLOCK: ICD-10-CM

## 2023-07-13 DIAGNOSIS — E66.3 OVERWEIGHT: ICD-10-CM

## 2023-07-13 DIAGNOSIS — E78.00 HYPERCHOLESTEROLEMIA: ICD-10-CM

## 2023-07-13 DIAGNOSIS — Z95.1 HISTORY OF CORONARY ARTERY BYPASS SURGERY: ICD-10-CM

## 2023-07-13 PROBLEM — I25.119 CORONARY ARTERY DISEASE INVOLVING NATIVE CORONARY ARTERY OF NATIVE HEART WITH ANGINA PECTORIS: Status: RESOLVED | Noted: 2023-06-07 | Resolved: 2023-07-13

## 2023-07-13 PROCEDURE — 3044F PR MOST RECENT HEMOGLOBIN A1C LEVEL <7.0%: ICD-10-PCS | Mod: CPTII,S$GLB,, | Performed by: INTERNAL MEDICINE

## 2023-07-13 PROCEDURE — 99999 PR PBB SHADOW E&M-EST. PATIENT-LVL III: CPT | Mod: PBBFAC,,, | Performed by: INTERNAL MEDICINE

## 2023-07-13 PROCEDURE — 3008F BODY MASS INDEX DOCD: CPT | Mod: CPTII,S$GLB,, | Performed by: INTERNAL MEDICINE

## 2023-07-13 PROCEDURE — 99999 PR PBB SHADOW E&M-EST. PATIENT-LVL III: ICD-10-PCS | Mod: PBBFAC,,, | Performed by: INTERNAL MEDICINE

## 2023-07-13 PROCEDURE — 4010F ACE/ARB THERAPY RXD/TAKEN: CPT | Mod: CPTII,S$GLB,, | Performed by: INTERNAL MEDICINE

## 2023-07-13 PROCEDURE — 1159F PR MEDICATION LIST DOCUMENTED IN MEDICAL RECORD: ICD-10-PCS | Mod: CPTII,S$GLB,, | Performed by: INTERNAL MEDICINE

## 2023-07-13 PROCEDURE — 3008F PR BODY MASS INDEX (BMI) DOCUMENTED: ICD-10-PCS | Mod: CPTII,S$GLB,, | Performed by: INTERNAL MEDICINE

## 2023-07-13 PROCEDURE — 3288F PR FALLS RISK ASSESSMENT DOCUMENTED: ICD-10-PCS | Mod: CPTII,S$GLB,, | Performed by: INTERNAL MEDICINE

## 2023-07-13 PROCEDURE — 99214 OFFICE O/P EST MOD 30 MIN: CPT | Mod: 25,S$GLB,, | Performed by: INTERNAL MEDICINE

## 2023-07-13 PROCEDURE — 3078F DIAST BP <80 MM HG: CPT | Mod: CPTII,S$GLB,, | Performed by: INTERNAL MEDICINE

## 2023-07-13 PROCEDURE — 1160F PR REVIEW ALL MEDS BY PRESCRIBER/CLIN PHARMACIST DOCUMENTED: ICD-10-PCS | Mod: CPTII,S$GLB,, | Performed by: INTERNAL MEDICINE

## 2023-07-13 PROCEDURE — 3288F FALL RISK ASSESSMENT DOCD: CPT | Mod: CPTII,S$GLB,, | Performed by: INTERNAL MEDICINE

## 2023-07-13 PROCEDURE — 93000 PR ELECTROCARDIOGRAM, COMPLETE: ICD-10-PCS | Mod: S$GLB,,, | Performed by: INTERNAL MEDICINE

## 2023-07-13 PROCEDURE — 3078F PR MOST RECENT DIASTOLIC BLOOD PRESSURE < 80 MM HG: ICD-10-PCS | Mod: CPTII,S$GLB,, | Performed by: INTERNAL MEDICINE

## 2023-07-13 PROCEDURE — 1160F RVW MEDS BY RX/DR IN RCRD: CPT | Mod: CPTII,S$GLB,, | Performed by: INTERNAL MEDICINE

## 2023-07-13 PROCEDURE — 1159F MED LIST DOCD IN RCRD: CPT | Mod: CPTII,S$GLB,, | Performed by: INTERNAL MEDICINE

## 2023-07-13 PROCEDURE — 1126F AMNT PAIN NOTED NONE PRSNT: CPT | Mod: CPTII,S$GLB,, | Performed by: INTERNAL MEDICINE

## 2023-07-13 PROCEDURE — 93000 ELECTROCARDIOGRAM COMPLETE: CPT | Mod: S$GLB,,, | Performed by: INTERNAL MEDICINE

## 2023-07-13 PROCEDURE — 93005 ELECTROCARDIOGRAM TRACING: CPT

## 2023-07-13 PROCEDURE — 3075F PR MOST RECENT SYSTOLIC BLOOD PRESS GE 130-139MM HG: ICD-10-PCS | Mod: CPTII,S$GLB,, | Performed by: INTERNAL MEDICINE

## 2023-07-13 PROCEDURE — 3075F SYST BP GE 130 - 139MM HG: CPT | Mod: CPTII,S$GLB,, | Performed by: INTERNAL MEDICINE

## 2023-07-13 PROCEDURE — 1101F PT FALLS ASSESS-DOCD LE1/YR: CPT | Mod: CPTII,S$GLB,, | Performed by: INTERNAL MEDICINE

## 2023-07-13 PROCEDURE — 99214 PR OFFICE/OUTPT VISIT, EST, LEVL IV, 30-39 MIN: ICD-10-PCS | Mod: 25,S$GLB,, | Performed by: INTERNAL MEDICINE

## 2023-07-13 PROCEDURE — 3044F HG A1C LEVEL LT 7.0%: CPT | Mod: CPTII,S$GLB,, | Performed by: INTERNAL MEDICINE

## 2023-07-13 PROCEDURE — 1126F PR PAIN SEVERITY QUANTIFIED, NO PAIN PRESENT: ICD-10-PCS | Mod: CPTII,S$GLB,, | Performed by: INTERNAL MEDICINE

## 2023-07-13 PROCEDURE — 1101F PR PT FALLS ASSESS DOC 0-1 FALLS W/OUT INJ PAST YR: ICD-10-PCS | Mod: CPTII,S$GLB,, | Performed by: INTERNAL MEDICINE

## 2023-07-13 PROCEDURE — 4010F PR ACE/ARB THEARPY RXD/TAKEN: ICD-10-PCS | Mod: CPTII,S$GLB,, | Performed by: INTERNAL MEDICINE

## 2023-07-13 NOTE — PROGRESS NOTES
"  Subjective:     Richie Johnson is a 68 y.o.male with hypertension and hypercholesterolemia. He is overweight. He has severe coronary artery disease and underwent coronary artery bypass grafting on 3/4/2013 receiving five grafts. He had some chest discomfort in 2016 and then had epigastric pain 6/2018. In 11/2019 he began experience epigastric discomfort and left arm pain. No clear exertional chest pain and no clear association with playing tennis. On 2/7/2020 he underwent a stress echocardiogram and was able 10:00 minutes on the treadmill. There was no chest pain. The electrocardiogram was "negative" with a right bundle branch block. The echocardiogram was "negative" as well with normal left ventricular size and systolic function. He may have had myalgia on statin in the past but appears to tolerate rosuvastatin well. In 2021 he has had some neck pain. No exertional chest pain or exertional dyspnea. No palpitations or weak spells. No issues with any of his prescribed medications. Feeling well overall.      Coronary Artery Disease  Presents for follow-up visit. The disease course has been stable. The condition has lasted for  years. Pertinent negatives include no chest pain, chest pressure, chest tightness, dizziness, leg swelling, muscle weakness, palpitations, shortness of breath or weight gain. Risk factors include hyperlipidemia. Risk factors do not include decreased physical activity, diabetes, hypertension or obesity. The symptoms have been stable.   Chest Pain   This is a chronic problem. The current episode started more than 1 month ago. The problem has been resolved. Associated symptoms include back pain. Pertinent negatives include no abdominal pain, claudication, cough, diaphoresis, dizziness, exertional chest pressure, fever, headaches, hemoptysis, irregular heartbeat, leg pain, lower extremity edema, malaise/fatigue, nausea, near-syncope, numbness, orthopnea, palpitations, PND, shortness of breath, " sputum production, syncope, vomiting or weakness.   His past medical history is significant for CAD and hyperlipidemia.   Pertinent negatives for past medical history include no diabetes, no hypertension and no muscle weakness.   Hypertension  The problem is unchanged. The problem is controlled (usually 120-130/60-75 mmHg at home). Associated symptoms include neck pain. Pertinent negatives include no anxiety, blurred vision, chest pain, headaches, malaise/fatigue, orthopnea, palpitations, PND, shortness of breath or sweats. There is no history of chronic renal disease.   Hyperlipidemia  This is a chronic problem. The current episode started more than 1 year ago. The problem is controlled. Recent lipid tests were reviewed and are normal. He has no history of chronic renal disease, diabetes, hypothyroidism, liver disease, obesity or nephrotic syndrome. Pertinent negatives include no chest pain, focal sensory loss, focal weakness, leg pain, myalgias or shortness of breath.     Review of Systems   Constitutional: Negative for diaphoresis, fever, malaise/fatigue and weight gain.   HENT:  Negative for nosebleeds.    Eyes:  Negative for blurred vision, double vision, pain, vision loss in left eye and vision loss in right eye.   Cardiovascular:  Negative for chest pain, claudication, dyspnea on exertion, irregular heartbeat, leg swelling, near-syncope, orthopnea, palpitations, paroxysmal nocturnal dyspnea and syncope.   Respiratory:  Negative for chest tightness, cough, hemoptysis, shortness of breath, sputum production and wheezing.    Endocrine: Negative for cold intolerance and heat intolerance.   Hematologic/Lymphatic: Negative for bleeding problem. Does not bruise/bleed easily.   Skin:  Negative for color change and itching.   Musculoskeletal:  Positive for back pain and neck pain. Negative for falls, muscle weakness and myalgias.   Gastrointestinal:  Negative for abdominal pain, flatus, heartburn, hematemesis,  "hematochezia, hemorrhoids, jaundice, melena, nausea and vomiting.   Genitourinary:  Negative for dysuria and hematuria.   Neurological:  Negative for dizziness, focal weakness, headaches, light-headedness, loss of balance, numbness, sensory change, tremors and weakness.   Psychiatric/Behavioral:  Negative for altered mental status, depression and memory loss. The patient is not nervous/anxious.    Allergic/Immunologic: Negative for hives and persistent infections.       Current Outpatient Medications on File Prior to Visit   Medication Sig Dispense Refill    aspirin (ECOTRIN) 81 MG EC tablet Take 1 tablet (81 mg total) by mouth once daily. 90 tablet 3    CALCIUM CARBONATE/VITAMIN D3 (VITAMIN D-3 ORAL) Take 2,000 Int'l Units by mouth once daily.      ezetimibe (ZETIA) 10 mg tablet Take 1 tablet (10 mg total) by mouth once daily. 90 tablet 3    multivitamin (THERAGRAN) per tablet Take 1 tablet by mouth once daily.      ramipriL (ALTACE) 10 MG capsule Take 1 capsule (10 mg total) by mouth once daily. 90 capsule 3    rosuvastatin (CRESTOR) 40 MG Tab Take 1 tablet (40 mg total) by mouth once daily. 90 tablet 3    UBIDECARENONE (COQ-10 ORAL) Take 300 mg by mouth once daily.       naproxen sodium (ANAPROX) 220 MG tablet Take 220 mg by mouth 2 (two) times daily with meals.      pneumoc 20-migel conj-dip cr,PF, (PREVNAR 20, PF,) 0.5 mL Syrg injection Inject into the muscle. 0.5 mL 0     Current Facility-Administered Medications on File Prior to Visit   Medication Dose Route Frequency Provider Last Rate Last Admin    0.9%  NaCl infusion   Intravenous Continuous Saul Gonzalez MD           BP (!) 136/56 (BP Location: Right arm, Patient Position: Sitting, BP Method: Medium (Manual))   Pulse (!) 58   Ht 6' 1" (1.854 m)   Wt 97.3 kg (214 lb 8.1 oz)   SpO2 97%   BMI 28.30 kg/m²       Objective:     Physical Exam  Constitutional:       General: He is not in acute distress.     Appearance: Normal appearance. He is " well-developed. He is not diaphoretic.   HENT:      Head: Normocephalic and atraumatic.   Eyes:      General: No scleral icterus.        Right eye: No discharge.         Left eye: No discharge.      Conjunctiva/sclera: Conjunctivae normal.   Neck:      Thyroid: No thyromegaly.      Vascular: No carotid bruit or JVD.   Cardiovascular:      Rate and Rhythm: Normal rate and regular rhythm.      Pulses:           Radial pulses are 2+ on the right side and 2+ on the left side.        Dorsalis pedis pulses are 2+ on the right side and 2+ on the left side.        Posterior tibial pulses are 2+ on the right side and 2+ on the left side.      Heart sounds: S1 normal and S2 normal. No murmur heard.    Gallop present. S4 sounds present. No S3 sounds.   Pulmonary:      Effort: Pulmonary effort is normal.      Breath sounds: Normal breath sounds.   Abdominal:      Palpations: Abdomen is soft.      Tenderness: There is no abdominal tenderness.   Musculoskeletal:      Cervical back: Neck supple.      Right ankle: No swelling, deformity or ecchymosis.      Left ankle: No swelling, deformity or ecchymosis.   Lymphadenopathy:      Head:      Right side of head: No submandibular adenopathy.      Left side of head: No submandibular adenopathy.      Cervical: No cervical adenopathy.   Skin:     General: Skin is warm and dry.      Findings: No rash.   Neurological:      General: No focal deficit present.      Mental Status: He is alert and oriented to person, place, and time.   Psychiatric:         Attention and Perception: Attention and perception normal.         Mood and Affect: Mood and affect normal.         Speech: Speech normal.         Behavior: Behavior normal.         Thought Content: Thought content normal.         Cognition and Memory: Cognition and memory normal.         Judgment: Judgment normal.       Assessment:     1. Coronary artery disease involving native coronary artery of native heart without angina pectoris    2.  "History of coronary artery bypass surgery    3. History of chest pain    4. Right bundle branch block    5. Primary hypertension    6. Hypercholesterolemia    7. Overweight        Plan:      1. Coronary Artery Disease   2/28/2013: [facility]: Cath: 3V CAD.    3/4/2013: [facility]: CABG x 5: LIMA to LAD, SVG1 to D1, SVG2 to RI & OM1 and SVG3 to RCA.   2/7/2020: Stress Echo: 10:00 min. No CP. ECG "negative". RBBB. Echo: "negative". Normal left ventricular size and systolic function.   On aspirin 81 mg Q24.   Appears stable.   7/13/2023: Do Echo.    2. History of Chest Pain   8/15/2016: Experienced chest pain.   10/20/2016: Echo: Normal left ventricular size and systolic function. Mild LVH. Moderate diastolic dysfunction. Mildly dilated LA.   10/20/2016: Regadenoson MPI: Small moderately dense fixed inferoapical defect.   10/26/2018: Echo: Normal left ventricular size and systolic function. Mild LVH. Mildly dilated LA.   6/2018: Episode of epigastric pain.   11/2019: Recurrent pain.   Appeared atypical.   Resolved.     3. Right Bundle Branch Block   2/6/2020: NSR. RBBB.   7/13/2023: NSR. RBBB.   1/12/2023: Do Holter x 48 hours.    4. Hypertension   8/22/2018: 135/79 mmHg.   4/24/2019: 159/86 mmHg.   6/18/2020: 137/79 mmHg.   9/17/2020: 152/80 mmHg.   9/17/2020: Ramipril 5 mg Q24 was begun.    5/13/2021: Ramipril 5 mg Q24 was increased to ramipril 10 mg Q24.   On ramipril 10 mg Q24.   Keeping log at home.   Well controlled.     5. Hypercholesterolemia   5/16/2013: Chol 209. HDL 43. . .   Was on atorvastatin 20 mg Q24 - unable to tolerate 40 mg Q24.   On rosuvastatin 40 mg Q24 and ezetimide 10 mg Q24.   12/10/2014: Chol 172. HDL 46. LDL 87. .   9/24/2015: Chol 148. HDL 50. LDL 78. TG 99.   9/20/2018: Chol 124. HDL 48. LDL 65. TG 54.   3/30/2022: Chol 134. HDL 51. LDL 73. TG 50.   On rosuvastatin 40 mg Q24 and ezetimibe 10 mg Q24.   Vet favorable lipid panel.    6. Overweight   8/22/2018: Weight 96 kg. BMI " 28.   Encouraged to lose weight.    7. Back Pain   2012: Back Surgery.    8. Primary Care   Dr. Patricia Barrett.    F/u 3 months.    Tiffanie Hawk M.D.      Copy:    Dr. Ezequiel Roger.      9/15/2023 3:53 PM, Addendum:    9/15/2023: Echo: Normal left ventricular size and systolic function. EF 65%. Moderate diastolic dysfunction. Moderately enlarged LA.     I discussed the above test result and the implications of the findings over the phone.    Tiffanie Hawk M.D.         9/22/2023 4:43 PM, Addendum:    9/22/2023: Holter: Sinus rhythm 68 () bpm. Normal MD interval. Widened QRS with RBBB morphology. No pauses. Rare APC's - <1/hr. Rare VPC's - <1/hr. No reported symptoms.    I discussed the above test result and the implications of the findings over the phone.    Tiffanie Hawk M.D.

## 2023-09-13 ENCOUNTER — LAB VISIT (OUTPATIENT)
Dept: LAB | Facility: OTHER | Age: 68
End: 2023-09-13
Attending: STUDENT IN AN ORGANIZED HEALTH CARE EDUCATION/TRAINING PROGRAM
Payer: MEDICARE

## 2023-09-13 DIAGNOSIS — R73.03 PREDIABETES: ICD-10-CM

## 2023-09-13 LAB
ESTIMATED AVG GLUCOSE: 120 MG/DL (ref 68–131)
HBA1C MFR BLD: 5.8 % (ref 4–5.6)

## 2023-09-13 PROCEDURE — 36415 COLL VENOUS BLD VENIPUNCTURE: CPT | Performed by: STUDENT IN AN ORGANIZED HEALTH CARE EDUCATION/TRAINING PROGRAM

## 2023-09-13 PROCEDURE — 83036 HEMOGLOBIN GLYCOSYLATED A1C: CPT | Performed by: STUDENT IN AN ORGANIZED HEALTH CARE EDUCATION/TRAINING PROGRAM

## 2023-09-14 ENCOUNTER — HOSPITAL ENCOUNTER (OUTPATIENT)
Dept: CARDIOLOGY | Facility: OTHER | Age: 68
Discharge: HOME | End: 2023-09-14
Attending: INTERNAL MEDICINE
Payer: MEDICARE

## 2023-09-14 VITALS
WEIGHT: 214 LBS | SYSTOLIC BLOOD PRESSURE: 136 MMHG | HEIGHT: 73 IN | DIASTOLIC BLOOD PRESSURE: 56 MMHG | BODY MASS INDEX: 28.36 KG/M2 | HEART RATE: 58 BPM

## 2023-09-14 DIAGNOSIS — I45.10 RIGHT BUNDLE BRANCH BLOCK: ICD-10-CM

## 2023-09-14 DIAGNOSIS — I25.10 CORONARY ARTERY DISEASE INVOLVING NATIVE CORONARY ARTERY OF NATIVE HEART WITHOUT ANGINA PECTORIS: ICD-10-CM

## 2023-09-14 PROCEDURE — 93306 ECHO (CUPID ONLY): ICD-10-PCS | Mod: 26,,, | Performed by: INTERNAL MEDICINE

## 2023-09-14 PROCEDURE — 93227 XTRNL ECG REC<48 HR R&I: CPT | Mod: ,,, | Performed by: INTERNAL MEDICINE

## 2023-09-14 PROCEDURE — 93226 XTRNL ECG REC<48 HR SCAN A/R: CPT

## 2023-09-14 PROCEDURE — 93227 HOLTER MONITOR - 48 HOUR (CUPID ONLY): ICD-10-PCS | Mod: ,,, | Performed by: INTERNAL MEDICINE

## 2023-09-14 PROCEDURE — 93306 TTE W/DOPPLER COMPLETE: CPT | Mod: 26,,, | Performed by: INTERNAL MEDICINE

## 2023-09-14 PROCEDURE — 93306 TTE W/DOPPLER COMPLETE: CPT

## 2023-09-15 LAB
ASCENDING AORTA: 3.01 CM
AV INDEX (PROSTH): 0.93
AV MEAN GRADIENT: 3 MMHG
AV PEAK GRADIENT: 5 MMHG
AV VALVE AREA BY VELOCITY RATIO: 3.94 CM²
AV VALVE AREA: 4 CM²
AV VELOCITY RATIO: 0.92
BSA FOR ECHO PROCEDURE: 2.24 M2
CV ECHO LV RWT: 0.43 CM
DOP CALC AO PEAK VEL: 1.09 M/S
DOP CALC AO VTI: 28.5 CM
DOP CALC LVOT AREA: 4.3 CM2
DOP CALC LVOT DIAMETER: 2.34 CM
DOP CALC LVOT PEAK VEL: 1 M/S
DOP CALC LVOT STROKE VOLUME: 113.91 CM3
DOP CALCLVOT PEAK VEL VTI: 26.5 CM
E WAVE DECELERATION TIME: 207.05 MSEC
E/A RATIO: 1.37
E/E' RATIO: 10.11 M/S
ECHO LV POSTERIOR WALL: 1.05 CM (ref 0.6–1.1)
EJECTION FRACTION: 65 %
FRACTIONAL SHORTENING: 39 % (ref 28–44)
INTERVENTRICULAR SEPTUM: 1.12 CM (ref 0.6–1.1)
IVC DIAMETER: 1.44 CM
IVRT: 83.73 MSEC
LA MAJOR: 6.12 CM
LA MINOR: 5.28 CM
LA WIDTH: 5.2 CM
LEFT ATRIUM SIZE: 4.43 CM
LEFT ATRIUM VOLUME INDEX MOD: 27.4 ML/M2
LEFT ATRIUM VOLUME INDEX: 50.2 ML/M2
LEFT ATRIUM VOLUME MOD: 60.56 CM3
LEFT ATRIUM VOLUME: 111 CM3
LEFT INTERNAL DIMENSION IN SYSTOLE: 2.97 CM (ref 2.1–4)
LEFT VENTRICLE DIASTOLIC VOLUME INDEX: 50.43 ML/M2
LEFT VENTRICLE DIASTOLIC VOLUME: 111.45 ML
LEFT VENTRICLE MASS INDEX: 88 G/M2
LEFT VENTRICLE SYSTOLIC VOLUME INDEX: 15.5 ML/M2
LEFT VENTRICLE SYSTOLIC VOLUME: 34.17 ML
LEFT VENTRICULAR INTERNAL DIMENSION IN DIASTOLE: 4.87 CM (ref 3.5–6)
LEFT VENTRICULAR MASS: 194.8 G
LV LATERAL E/E' RATIO: 9.6 M/S
LV SEPTAL E/E' RATIO: 10.67 M/S
LVOT MG: 2.21 MMHG
LVOT MV: 0.7 CM/S
MV PEAK A VEL: 0.7 M/S
MV PEAK E VEL: 0.96 M/S
MV STENOSIS PRESSURE HALF TIME: 61.58 MS
MV VALVE AREA P 1/2 METHOD: 3.57 CM2
PISA MRMAX VEL: 4.08 M/S
PISA TR MAX VEL: 2.14 M/S
PULM VEIN S/D RATIO: 1.36
PV PEAK D VEL: 0.36 M/S
PV PEAK GRADIENT: 5 MMHG
PV PEAK S VEL: 0.49 M/S
PV PEAK VELOCITY: 1.16 M/S
RA MAJOR: 4.48 CM
RA PRESSURE ESTIMATED: 3 MMHG
RA WIDTH: 3.9 CM
RV TB RVSP: 5 MMHG
STJ: 3.17 CM
TDI LATERAL: 0.1 M/S
TDI SEPTAL: 0.09 M/S
TDI: 0.1 M/S
TR MAX PG: 18 MMHG
TV REST PULMONARY ARTERY PRESSURE: 21 MMHG
Z-SCORE OF LEFT VENTRICULAR DIMENSION IN END DIASTOLE: -4.49
Z-SCORE OF LEFT VENTRICULAR DIMENSION IN END SYSTOLE: -3.52

## 2023-09-22 LAB
OHS CV EVENT MONITOR DAY: 0
OHS CV HOLTER LENGTH DECIMAL HOURS: 48
OHS CV HOLTER LENGTH HOURS: 48
OHS CV HOLTER LENGTH MINUTES: 0
OHS CV HOLTER SINUS AVERAGE HR: 68
OHS CV HOLTER SINUS MAX HR: 114
OHS CV HOLTER SINUS MIN HR: 49

## 2023-11-27 ENCOUNTER — OFFICE VISIT (OUTPATIENT)
Dept: INTERNAL MEDICINE | Facility: CLINIC | Age: 68
End: 2023-11-27
Payer: MEDICARE

## 2023-11-27 VITALS
OXYGEN SATURATION: 98 % | WEIGHT: 228.63 LBS | HEIGHT: 73 IN | SYSTOLIC BLOOD PRESSURE: 132 MMHG | BODY MASS INDEX: 30.3 KG/M2 | DIASTOLIC BLOOD PRESSURE: 70 MMHG

## 2023-11-27 DIAGNOSIS — I25.10 CORONARY ARTERY DISEASE INVOLVING NATIVE CORONARY ARTERY OF NATIVE HEART WITHOUT ANGINA PECTORIS: ICD-10-CM

## 2023-11-27 DIAGNOSIS — M54.50 CHRONIC MIDLINE LOW BACK PAIN WITHOUT SCIATICA: ICD-10-CM

## 2023-11-27 DIAGNOSIS — Z87.898 HISTORY OF CHEST PAIN: ICD-10-CM

## 2023-11-27 DIAGNOSIS — E78.00 HYPERCHOLESTEROLEMIA: ICD-10-CM

## 2023-11-27 DIAGNOSIS — Z95.1 HISTORY OF CORONARY ARTERY BYPASS SURGERY: ICD-10-CM

## 2023-11-27 DIAGNOSIS — R73.03 PREDIABETES: ICD-10-CM

## 2023-11-27 DIAGNOSIS — G89.29 CHRONIC MIDLINE LOW BACK PAIN WITHOUT SCIATICA: ICD-10-CM

## 2023-11-27 DIAGNOSIS — I45.10 RIGHT BUNDLE BRANCH BLOCK: ICD-10-CM

## 2023-11-27 DIAGNOSIS — Z00.00 ENCOUNTER FOR PREVENTIVE HEALTH EXAMINATION: Primary | ICD-10-CM

## 2023-11-27 DIAGNOSIS — I10 PRIMARY HYPERTENSION: ICD-10-CM

## 2023-11-27 DIAGNOSIS — S46.011S TRAUMATIC COMPLETE TEAR OF RIGHT ROTATOR CUFF, SEQUELA: ICD-10-CM

## 2023-11-27 DIAGNOSIS — E66.3 OVERWEIGHT: ICD-10-CM

## 2023-11-27 PROCEDURE — 3288F FALL RISK ASSESSMENT DOCD: CPT | Mod: CPTII,S$GLB,, | Performed by: NURSE PRACTITIONER

## 2023-11-27 PROCEDURE — 3288F PR FALLS RISK ASSESSMENT DOCUMENTED: ICD-10-PCS | Mod: CPTII,S$GLB,, | Performed by: NURSE PRACTITIONER

## 2023-11-27 PROCEDURE — 4010F PR ACE/ARB THEARPY RXD/TAKEN: ICD-10-PCS | Mod: CPTII,S$GLB,, | Performed by: NURSE PRACTITIONER

## 2023-11-27 PROCEDURE — 4010F ACE/ARB THERAPY RXD/TAKEN: CPT | Mod: CPTII,S$GLB,, | Performed by: NURSE PRACTITIONER

## 2023-11-27 PROCEDURE — G0439 PR MEDICARE ANNUAL WELLNESS SUBSEQUENT VISIT: ICD-10-PCS | Mod: S$GLB,,, | Performed by: NURSE PRACTITIONER

## 2023-11-27 PROCEDURE — 3075F PR MOST RECENT SYSTOLIC BLOOD PRESS GE 130-139MM HG: ICD-10-PCS | Mod: CPTII,S$GLB,, | Performed by: NURSE PRACTITIONER

## 2023-11-27 PROCEDURE — 1101F PR PT FALLS ASSESS DOC 0-1 FALLS W/OUT INJ PAST YR: ICD-10-PCS | Mod: CPTII,S$GLB,, | Performed by: NURSE PRACTITIONER

## 2023-11-27 PROCEDURE — 99999 PR PBB SHADOW E&M-EST. PATIENT-LVL IV: ICD-10-PCS | Mod: PBBFAC,,, | Performed by: NURSE PRACTITIONER

## 2023-11-27 PROCEDURE — 3078F PR MOST RECENT DIASTOLIC BLOOD PRESSURE < 80 MM HG: ICD-10-PCS | Mod: CPTII,S$GLB,, | Performed by: NURSE PRACTITIONER

## 2023-11-27 PROCEDURE — 1101F PT FALLS ASSESS-DOCD LE1/YR: CPT | Mod: CPTII,S$GLB,, | Performed by: NURSE PRACTITIONER

## 2023-11-27 PROCEDURE — 99999 PR PBB SHADOW E&M-EST. PATIENT-LVL IV: CPT | Mod: PBBFAC,,, | Performed by: NURSE PRACTITIONER

## 2023-11-27 PROCEDURE — 3044F HG A1C LEVEL LT 7.0%: CPT | Mod: CPTII,S$GLB,, | Performed by: NURSE PRACTITIONER

## 2023-11-27 PROCEDURE — 1159F PR MEDICATION LIST DOCUMENTED IN MEDICAL RECORD: ICD-10-PCS | Mod: CPTII,S$GLB,, | Performed by: NURSE PRACTITIONER

## 2023-11-27 PROCEDURE — 3078F DIAST BP <80 MM HG: CPT | Mod: CPTII,S$GLB,, | Performed by: NURSE PRACTITIONER

## 2023-11-27 PROCEDURE — 3044F PR MOST RECENT HEMOGLOBIN A1C LEVEL <7.0%: ICD-10-PCS | Mod: CPTII,S$GLB,, | Performed by: NURSE PRACTITIONER

## 2023-11-27 PROCEDURE — 1159F MED LIST DOCD IN RCRD: CPT | Mod: CPTII,S$GLB,, | Performed by: NURSE PRACTITIONER

## 2023-11-27 PROCEDURE — 1170F PR FUNCTIONAL STATUS ASSESSED: ICD-10-PCS | Mod: CPTII,S$GLB,, | Performed by: NURSE PRACTITIONER

## 2023-11-27 PROCEDURE — 3075F SYST BP GE 130 - 139MM HG: CPT | Mod: CPTII,S$GLB,, | Performed by: NURSE PRACTITIONER

## 2023-11-27 PROCEDURE — 1170F FXNL STATUS ASSESSED: CPT | Mod: CPTII,S$GLB,, | Performed by: NURSE PRACTITIONER

## 2023-11-27 PROCEDURE — G0439 PPPS, SUBSEQ VISIT: HCPCS | Mod: S$GLB,,, | Performed by: NURSE PRACTITIONER

## 2023-11-27 NOTE — PROGRESS NOTES
"  Richie Johnson presented for a  Medicare AWV and comprehensive Health Risk Assessment today. The following components were reviewed and updated:    Medical history  Family History  Social history  Allergies and Current Medications  Health Risk Assessment  Health Maintenance  Care Team         ** See Completed Assessments for Annual Wellness Visit within the encounter summary.**         The following assessments were completed:  Living Situation  CAGE  Depression Screening  Timed Get Up and Go  Whisper Test  Cognitive Function Screening  Nutrition Screening  ADL Screening  PAQ Screening        Vitals:    11/27/23 1056   BP: 132/70   BP Location: Right arm   Patient Position: Sitting   BP Method: Large (Manual)   Pulse: (!) 0   SpO2: 98%   Weight: 103.7 kg (228 lb 9.9 oz)   Height: 6' 1" (1.854 m)     Body mass index is 30.16 kg/m².    Physical Exam  Vitals reviewed.   Constitutional:       Appearance: He is well-developed.   HENT:      Head: Normocephalic and atraumatic. Not macrocephalic and not microcephalic. No raccoon eyes, Vigil's sign, abrasion, contusion, right periorbital erythema, left periorbital erythema or laceration. Hair is normal.      Right Ear: No decreased hearing noted. No laceration, drainage, swelling or tenderness. No middle ear effusion. No foreign body. No mastoid tenderness. No hemotympanum. Tympanic membrane is not injected, scarred, perforated, erythematous, retracted or bulging. Tympanic membrane has normal mobility.      Left Ear: No decreased hearing noted. No laceration, drainage, swelling or tenderness.  No middle ear effusion. No foreign body. No mastoid tenderness. No hemotympanum. Tympanic membrane is not injected, scarred, perforated, erythematous, retracted or bulging. Tympanic membrane has normal mobility.      Nose: Nose normal. No nasal deformity, laceration or mucosal edema.      Mouth/Throat:      Pharynx: Uvula midline.   Eyes:      General: Lids are normal.      " Conjunctiva/sclera: Conjunctivae normal.   Neck:      Thyroid: No thyroid mass or thyromegaly.      Trachea: Trachea normal.   Cardiovascular:      Rate and Rhythm: Normal rate and regular rhythm.   Pulmonary:      Effort: Pulmonary effort is normal.      Breath sounds: Normal breath sounds.   Abdominal:      Palpations: Abdomen is soft.   Musculoskeletal:         General: Normal range of motion.      Cervical back: Neck supple. No edema or erythema. No spinous process tenderness or muscular tenderness. Normal range of motion.   Lymphadenopathy:      Head:      Right side of head: No submental, submandibular, tonsillar, preauricular or posterior auricular adenopathy.      Left side of head: No submental, submandibular, tonsillar, preauricular, posterior auricular or occipital adenopathy.   Skin:     General: Skin is warm and dry.   Neurological:      Mental Status: He is alert and oriented to person, place, and time.      Cranial Nerves: No cranial nerve deficit.      Sensory: No sensory deficit.   Psychiatric:         Behavior: Behavior normal.         Thought Content: Thought content normal.         Judgment: Judgment normal.             Diagnoses and health risks identified today and associated recommendations/orders:    1. Encounter for preventive health examination  Annual Health Risk Assessment (HRA) visit today.  Counseling and referral of health maintenance and preventative health measures performed.  Patient given annual wellness paperwork to take home.  Encouraged to return in 1 year for subsequent HRA visit.     2. Coronary artery disease involving native coronary artery of native heart without angina pectoris  Chronic. Stable. Continue current treatment plan as previously prescribed by PCP.    3. Prediabetes  Chronic. Stable. Last Hgb A1c=5.8 from 9/13/23. Continue current treatment plan as previously prescribed by PCP.    4. Right bundle branch block  Chronic. Stable. Continue current treatment plan as  previously prescribed by PCP.    5. Primary hypertension  Chronic. Stable. Controlled. Encouraged to increase exercise as tolerated (moderate-intensity aerobic activity and muscle-strengthening activities) improve diet to heart healthy, low sodium diet.  Continue current treatment plan as previously prescribed by PCP.    6. Hypercholesterolemia  Chronic. Stable. Continue current treatment plan as previously prescribed by PCP.    7. History of coronary artery bypass surgery  Chronic. Stable. Continue current treatment plan as previously prescribed by PCP.    8. History of chest pain  Chronic. Stable. Continue current treatment plan as previously prescribed by PCP.    9. Overweight  Chronic. Stable. Encouraged to increase exercise as tolerated and improve diet to heart healthy, low sodium diet. Continue current treatment plan as previously prescribed by PCP.    10. Chronic midline low back pain without sciatica  Chronic. Stable. Continue current treatment plan as previously prescribed by PCP.    11. 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 Stable. Continue current treatment plan as previously prescribed by PCP.        Provided Richie with a 5-10 year written screening schedule and personal prevention plan. Recommendations were developed using the USPSTF age appropriate recommendations. Education, counseling, and referrals were provided as needed. After Visit Summary printed and given to patient which includes a list of additional screenings\tests needed.      I offered to discuss end of life issues, including information on how to make advance directives that the patient could use to name someone who would make medical decisions on their behalf if they became too ill to make themselves.    ___Patient declined  _X_Patient is interested, I provided paper work and offered to discuss.    No follow-ups on file.    RENETTA Jaimes offered to discuss advanced care planning, including how to pick a  person who would make decisions for you if you were unable to make them for yourself, called a health care power of , and what kind of decisions you might make such as use of life sustaining treatments such as ventilators and tube feeding when faced with a life limiting illness recorded on a living will that they will need to know. (How you want to be cared for as you near the end of your natural life)     X Patient is interested in learning more about how to make advanced directives.  I provided them paperwork and offered to discuss this with them.

## 2023-11-27 NOTE — PATIENT INSTRUCTIONS
Counseling and Referral of Other Preventative  (Italic type indicates deductible and co-insurance are waived)    Patient Name: Richie Johnson  Today's Date: 11/27/2023    Health Maintenance       Date Due Completion Date    RSV Vaccine (Age 60+ and Pregnant patients) (1 - 1-dose 60+ series) Never done ---    PROSTATE-SPECIFIC ANTIGEN 06/07/2024 6/7/2023    High Dose Statin 07/13/2024 7/13/2023    Aspirin/Antiplatelet Therapy 07/13/2024 7/13/2023    Hemoglobin A1c (Prediabetes) 09/13/2024 9/13/2023    Colorectal Cancer Screening 01/27/2028 7/7/2022    Lipid Panel 06/07/2028 6/7/2023    TETANUS VACCINE 06/07/2033 6/7/2023        No orders of the defined types were placed in this encounter.      The following information is provided to all patients.  This information is to help you find resources for any of the problems found today that may be affecting your health:                Living healthy guide: www.[facility].[place].gov      Understanding Diabetes: www.diabetes.org      Eating healthy: www.cdc.gov/healthyweight      [facility] home safety checklist: www.cdc.gov/steadi/patient.html      Agency on Aging: www.goea.[place].gov      Alcoholics anonymous (AA): www.aa.org      Physical Activity: www.beka.nih.gov/rz4ikum      Tobacco use: www.quitwithusla.org

## 2023-11-30 ENCOUNTER — OFFICE VISIT (OUTPATIENT)
Dept: CARDIOLOGY | Facility: CLINIC | Age: 68
End: 2023-11-30
Attending: INTERNAL MEDICINE
Payer: MEDICARE

## 2023-11-30 VITALS
BODY MASS INDEX: 29.73 KG/M2 | HEIGHT: 73 IN | DIASTOLIC BLOOD PRESSURE: 69 MMHG | WEIGHT: 224.31 LBS | SYSTOLIC BLOOD PRESSURE: 139 MMHG

## 2023-11-30 DIAGNOSIS — I25.10 CORONARY ARTERY DISEASE INVOLVING NATIVE CORONARY ARTERY OF NATIVE HEART WITHOUT ANGINA PECTORIS: ICD-10-CM

## 2023-11-30 DIAGNOSIS — Z87.898 HISTORY OF CHEST PAIN: ICD-10-CM

## 2023-11-30 DIAGNOSIS — E78.00 HYPERCHOLESTEROLEMIA: ICD-10-CM

## 2023-11-30 DIAGNOSIS — I10 PRIMARY HYPERTENSION: ICD-10-CM

## 2023-11-30 DIAGNOSIS — Z95.1 HISTORY OF CORONARY ARTERY BYPASS SURGERY: ICD-10-CM

## 2023-11-30 DIAGNOSIS — E66.3 OVERWEIGHT: ICD-10-CM

## 2023-11-30 DIAGNOSIS — I45.10 RIGHT BUNDLE BRANCH BLOCK: ICD-10-CM

## 2023-11-30 PROCEDURE — 1126F AMNT PAIN NOTED NONE PRSNT: CPT | Mod: CPTII,S$GLB,, | Performed by: INTERNAL MEDICINE

## 2023-11-30 PROCEDURE — 1159F PR MEDICATION LIST DOCUMENTED IN MEDICAL RECORD: ICD-10-PCS | Mod: CPTII,S$GLB,, | Performed by: INTERNAL MEDICINE

## 2023-11-30 PROCEDURE — 3075F PR MOST RECENT SYSTOLIC BLOOD PRESS GE 130-139MM HG: ICD-10-PCS | Mod: CPTII,S$GLB,, | Performed by: INTERNAL MEDICINE

## 2023-11-30 PROCEDURE — 3044F PR MOST RECENT HEMOGLOBIN A1C LEVEL <7.0%: ICD-10-PCS | Mod: CPTII,S$GLB,, | Performed by: INTERNAL MEDICINE

## 2023-11-30 PROCEDURE — 1101F PR PT FALLS ASSESS DOC 0-1 FALLS W/OUT INJ PAST YR: ICD-10-PCS | Mod: CPTII,S$GLB,, | Performed by: INTERNAL MEDICINE

## 2023-11-30 PROCEDURE — 4010F ACE/ARB THERAPY RXD/TAKEN: CPT | Mod: CPTII,S$GLB,, | Performed by: INTERNAL MEDICINE

## 2023-11-30 PROCEDURE — 3044F HG A1C LEVEL LT 7.0%: CPT | Mod: CPTII,S$GLB,, | Performed by: INTERNAL MEDICINE

## 2023-11-30 PROCEDURE — 99214 OFFICE O/P EST MOD 30 MIN: CPT | Mod: S$GLB,,, | Performed by: INTERNAL MEDICINE

## 2023-11-30 PROCEDURE — 3008F PR BODY MASS INDEX (BMI) DOCUMENTED: ICD-10-PCS | Mod: CPTII,S$GLB,, | Performed by: INTERNAL MEDICINE

## 2023-11-30 PROCEDURE — 3078F DIAST BP <80 MM HG: CPT | Mod: CPTII,S$GLB,, | Performed by: INTERNAL MEDICINE

## 2023-11-30 PROCEDURE — 99999 PR PBB SHADOW E&M-EST. PATIENT-LVL III: CPT | Mod: PBBFAC,,, | Performed by: INTERNAL MEDICINE

## 2023-11-30 PROCEDURE — 99214 PR OFFICE/OUTPT VISIT, EST, LEVL IV, 30-39 MIN: ICD-10-PCS | Mod: S$GLB,,, | Performed by: INTERNAL MEDICINE

## 2023-11-30 PROCEDURE — 3288F FALL RISK ASSESSMENT DOCD: CPT | Mod: CPTII,S$GLB,, | Performed by: INTERNAL MEDICINE

## 2023-11-30 PROCEDURE — 1160F PR REVIEW ALL MEDS BY PRESCRIBER/CLIN PHARMACIST DOCUMENTED: ICD-10-PCS | Mod: CPTII,S$GLB,, | Performed by: INTERNAL MEDICINE

## 2023-11-30 PROCEDURE — 1160F RVW MEDS BY RX/DR IN RCRD: CPT | Mod: CPTII,S$GLB,, | Performed by: INTERNAL MEDICINE

## 2023-11-30 PROCEDURE — 3008F BODY MASS INDEX DOCD: CPT | Mod: CPTII,S$GLB,, | Performed by: INTERNAL MEDICINE

## 2023-11-30 PROCEDURE — 1101F PT FALLS ASSESS-DOCD LE1/YR: CPT | Mod: CPTII,S$GLB,, | Performed by: INTERNAL MEDICINE

## 2023-11-30 PROCEDURE — 1159F MED LIST DOCD IN RCRD: CPT | Mod: CPTII,S$GLB,, | Performed by: INTERNAL MEDICINE

## 2023-11-30 PROCEDURE — 3075F SYST BP GE 130 - 139MM HG: CPT | Mod: CPTII,S$GLB,, | Performed by: INTERNAL MEDICINE

## 2023-11-30 PROCEDURE — 1126F PR PAIN SEVERITY QUANTIFIED, NO PAIN PRESENT: ICD-10-PCS | Mod: CPTII,S$GLB,, | Performed by: INTERNAL MEDICINE

## 2023-11-30 PROCEDURE — 3078F PR MOST RECENT DIASTOLIC BLOOD PRESSURE < 80 MM HG: ICD-10-PCS | Mod: CPTII,S$GLB,, | Performed by: INTERNAL MEDICINE

## 2023-11-30 PROCEDURE — 4010F PR ACE/ARB THEARPY RXD/TAKEN: ICD-10-PCS | Mod: CPTII,S$GLB,, | Performed by: INTERNAL MEDICINE

## 2023-11-30 PROCEDURE — 99999 PR PBB SHADOW E&M-EST. PATIENT-LVL III: ICD-10-PCS | Mod: PBBFAC,,, | Performed by: INTERNAL MEDICINE

## 2023-11-30 PROCEDURE — 3288F PR FALLS RISK ASSESSMENT DOCUMENTED: ICD-10-PCS | Mod: CPTII,S$GLB,, | Performed by: INTERNAL MEDICINE

## 2023-11-30 RX ORDER — ROSUVASTATIN CALCIUM 40 MG/1
40 TABLET, COATED ORAL DAILY
Qty: 90 TABLET | Refills: 3 | Status: SHIPPED | OUTPATIENT
Start: 2023-11-30

## 2023-11-30 RX ORDER — RAMIPRIL 10 MG/1
10 CAPSULE ORAL DAILY
Qty: 90 CAPSULE | Refills: 3 | Status: SHIPPED | OUTPATIENT
Start: 2023-11-30

## 2023-11-30 RX ORDER — ASPIRIN 81 MG/1
81 TABLET ORAL DAILY
Qty: 90 TABLET | Refills: 3 | Status: SHIPPED | OUTPATIENT
Start: 2023-11-30

## 2023-11-30 RX ORDER — EZETIMIBE 10 MG/1
10 TABLET ORAL DAILY
Qty: 90 TABLET | Refills: 3 | Status: SHIPPED | OUTPATIENT
Start: 2023-11-30

## 2023-11-30 NOTE — PROGRESS NOTES
"Subjective:     Richie Johnson is a 68 y.o.male with hypertension and hypercholesterolemia. He is overweight. He has severe coronary artery disease and underwent coronary artery bypass grafting on 3/4/2013 receiving five grafts. He had some chest discomfort in 2016 and then had epigastric pain 6/2018. In 11/2019 he began experience epigastric discomfort and left arm pain. No clear exertional chest pain and no clear association with playing tennis. On 2/7/2020 he underwent a stress echocardiogram and was able 10:00 minutes on the treadmill. There was no chest pain. The electrocardiogram was "negative" with a right bundle branch block. The echocardiogram was "negative" as well with normal left ventricular size and systolic function. He may have had myalgia on statin in the past but appears to tolerate rosuvastatin well. In 2021 he ha some neck pain. No exertional chest pain or exertional dyspnea. No palpitations or weak spells. No issues with any of his prescribed medications. Feeling well overall.      Coronary Artery Disease  Presents for follow-up visit. The disease course has been stable. The condition has lasted for  years. Pertinent negatives include no chest pain, chest pressure, chest tightness, dizziness, leg swelling, muscle weakness, palpitations, shortness of breath or weight gain. Risk factors include hyperlipidemia. Risk factors do not include decreased physical activity, diabetes, hypertension or obesity. The symptoms have been stable.   Chest Pain   This is a chronic problem. The current episode started more than 1 month ago. The problem has been resolved. Associated symptoms include back pain. Pertinent negatives include no abdominal pain, claudication, cough, diaphoresis, dizziness, exertional chest pressure, fever, headaches, hemoptysis, irregular heartbeat, leg pain, lower extremity edema, malaise/fatigue, nausea, near-syncope, numbness, orthopnea, palpitations, PND, shortness of breath, sputum " production, syncope, vomiting or weakness.   His past medical history is significant for CAD and hyperlipidemia.   Pertinent negatives for past medical history include no diabetes, no hypertension and no muscle weakness.   Hypertension  The problem is unchanged. The problem is controlled (usually 120-130/60-75 mmHg at home). Associated symptoms include neck pain. Pertinent negatives include no anxiety, blurred vision, chest pain, headaches, malaise/fatigue, orthopnea, palpitations, PND, shortness of breath or sweats. There is no history of chronic renal disease.   Hyperlipidemia  This is a chronic problem. The current episode started more than 1 year ago. The problem is controlled. Recent lipid tests were reviewed and are normal. He has no history of chronic renal disease, diabetes, hypothyroidism, liver disease, obesity or nephrotic syndrome. Pertinent negatives include no chest pain, focal sensory loss, focal weakness, leg pain, myalgias or shortness of breath.       Review of Systems   Constitutional: Negative for diaphoresis, fever, malaise/fatigue and weight gain.   HENT:  Negative for nosebleeds.    Eyes:  Negative for blurred vision, double vision, pain, vision loss in left eye and vision loss in right eye.   Cardiovascular:  Negative for chest pain, claudication, dyspnea on exertion, irregular heartbeat, leg swelling, near-syncope, orthopnea, palpitations, paroxysmal nocturnal dyspnea and syncope.   Respiratory:  Negative for chest tightness, cough, hemoptysis, shortness of breath, sputum production and wheezing.    Endocrine: Negative for cold intolerance and heat intolerance.   Hematologic/Lymphatic: Negative for bleeding problem. Does not bruise/bleed easily.   Skin:  Negative for color change and itching.   Musculoskeletal:  Positive for back pain and neck pain. Negative for falls, muscle weakness and myalgias.   Gastrointestinal:  Negative for abdominal pain, flatus, heartburn, hematemesis,  "hematochezia, hemorrhoids, jaundice, melena, nausea and vomiting.   Genitourinary:  Negative for dysuria and hematuria.   Neurological:  Negative for dizziness, focal weakness, headaches, light-headedness, loss of balance, numbness, sensory change, tremors and weakness.   Psychiatric/Behavioral:  Negative for altered mental status, depression and memory loss. The patient is not nervous/anxious.    Allergic/Immunologic: Negative for hives and persistent infections.       Current Outpatient Medications on File Prior to Visit   Medication Sig Dispense Refill    aspirin (ECOTRIN) 81 MG EC tablet Take 1 tablet (81 mg total) by mouth once daily. 90 tablet 3    CALCIUM CARBONATE/VITAMIN D3 (VITAMIN D-3 ORAL) Take 2,000 Int'l Units by mouth once daily.      ezetimibe (ZETIA) 10 mg tablet Take 1 tablet (10 mg total) by mouth once daily. 90 tablet 3    multivitamin (THERAGRAN) per tablet Take 1 tablet by mouth once daily.      naproxen sodium (ANAPROX) 220 MG tablet Take 220 mg by mouth 2 (two) times daily with meals.      pneumoc 20-migel conj-dip cr,PF, (PREVNAR 20, PF,) 0.5 mL Syrg injection Inject into the muscle. 0.5 mL 0    ramipriL (ALTACE) 10 MG capsule Take 1 capsule (10 mg total) by mouth once daily. 90 capsule 3    rosuvastatin (CRESTOR) 40 MG Tab Take 1 tablet (40 mg total) by mouth once daily. 90 tablet 3    UBIDECARENONE (COQ-10 ORAL) Take 300 mg by mouth once daily.        Current Facility-Administered Medications on File Prior to Visit   Medication Dose Route Frequency Provider Last Rate Last Admin    0.9%  NaCl infusion   Intravenous Continuous Saul Gonzalez MD           /69 (BP Location: Right arm, Patient Position: Sitting, BP Method: Medium (Manual))   Ht 6' 1" (1.854 m)   Wt 101.8 kg (224 lb 5.1 oz)   BMI 29.60 kg/m²     Objective:     Physical Exam  Constitutional:       General: He is not in acute distress.     Appearance: Normal appearance. He is well-developed. He is not diaphoretic. "   HENT:      Head: Normocephalic and atraumatic.   Eyes:      General: No scleral icterus.        Right eye: No discharge.         Left eye: No discharge.      Conjunctiva/sclera: Conjunctivae normal.   Neck:      Thyroid: No thyromegaly.      Vascular: No carotid bruit or JVD.   Cardiovascular:      Rate and Rhythm: Normal rate and regular rhythm.      Pulses:           Radial pulses are 2+ on the right side and 2+ on the left side.        Dorsalis pedis pulses are 2+ on the right side and 2+ on the left side.        Posterior tibial pulses are 2+ on the right side and 2+ on the left side.      Heart sounds: S1 normal and S2 normal. No murmur heard.     Gallop present. S4 sounds present. No S3 sounds.   Pulmonary:      Effort: Pulmonary effort is normal.      Breath sounds: Normal breath sounds.   Abdominal:      Palpations: Abdomen is soft.      Tenderness: There is no abdominal tenderness.   Musculoskeletal:      Cervical back: Neck supple.      Right ankle: No swelling, deformity or ecchymosis.      Left ankle: No swelling, deformity or ecchymosis.   Lymphadenopathy:      Head:      Right side of head: No submandibular adenopathy.      Left side of head: No submandibular adenopathy.      Cervical: No cervical adenopathy.   Skin:     General: Skin is warm and dry.      Findings: No rash.   Neurological:      General: No focal deficit present.      Mental Status: He is alert and oriented to person, place, and time.   Psychiatric:         Attention and Perception: Attention and perception normal.         Mood and Affect: Mood and affect normal.         Speech: Speech normal.         Behavior: Behavior normal.         Thought Content: Thought content normal.         Cognition and Memory: Cognition and memory normal.         Judgment: Judgment normal.         Assessment:     1. Coronary artery disease involving native coronary artery of native heart without angina pectoris    2. History of coronary artery bypass  "surgery    3. History of chest pain    4. Right bundle branch block    5. Primary hypertension    6. Hypercholesterolemia    7. Overweight        Plan:      1. Coronary Artery Disease   2/28/2013: [facility]: Cath: 3V CAD.    3/4/2013: [facility]: CABG x 5: LIMA to LAD, SVG1 to D1, SVG2 to RI & OM1 and SVG3 to RCA.   2/7/2020: Stress Echo: 10:00 min. No CP. ECG "negative". RBBB. Echo: "negative". Normal left ventricular size and systolic function.   9/15/2023: Echo: Normal left ventricular size and systolic function. EF 65%. Moderate diastolic dysfunction. Moderately enlarged LA.    On aspirin 81 mg Q24.   Appears stable.     2. History of Chest Pain   8/15/2016: Experienced chest pain.   10/20/2016: Echo: Normal left ventricular size and systolic function. Mild LVH. Moderate diastolic dysfunction. Mildly dilated LA.   10/20/2016: Regadenoson MPI: Small moderately dense fixed inferoapical defect.   10/26/2018: Echo: Normal left ventricular size and systolic function. Mild LVH. Mildly dilated LA.   6/2018: Episode of epigastric pain.   11/2019: Recurrent pain.   Appeared atypical.   Resolved.     3. Right Bundle Branch Block   2/6/2020: NSR. RBBB.   7/13/2023: NSR. RBBB.   9/22/2023: Holter: Sinus rhythm 68 () bpm. Normal LA interval. Widened QRS with RBBB morphology. No pauses. Rare APC's - <1/hr. Rare VPC's - <1/hr. No reported symptoms.     4. Hypertension   8/22/2018: 135/79 mmHg.   4/24/2019: 159/86 mmHg.   6/18/2020: 137/79 mmHg.   9/17/2020: 152/80 mmHg.   9/17/2020: Ramipril 5 mg Q24 was begun.    5/13/2021: Ramipril 5 mg Q24 was increased to ramipril 10 mg Q24.   On ramipril 10 mg Q24.   Keeping log at home.   Well controlled.     5. Hypercholesterolemia   5/16/2013: Chol 209. HDL 43. . .   Was on atorvastatin 20 mg Q24 - unable to tolerate 40 mg Q24.   On rosuvastatin 40 mg Q24 and ezetimide 10 mg Q24.   12/10/2014: Chol 172. HDL 46. LDL 87. .   9/24/2015: Chol 148. HDL 50. LDL 78. TG " 99.   9/20/2018: Chol 124. HDL 48. LDL 65. TG 54.   3/30/2022: Chol 134. HDL 51. LDL 73. TG 50.   6/7/2023: Chol 151. HDL 50. LDL 84. TG 85.   On rosuvastatin 40 mg Q24 and ezetimibe 10 mg Q24.   Favorable lipid panel.   Try to improve diet and lose weight.    6. Overweight   8/22/2018: Weight 96 kg. BMI 28.   11/30/2023: Weight 102 kg. BMI 30.   Encouraged to lose weight.    7. Back Pain   2012: Back Surgery.    8. Primary Care   Dr. Patricia Barrett.    F/u 6 months.    Tiffanie Hawk M.D.

## 2023-12-01 ENCOUNTER — PATIENT MESSAGE (OUTPATIENT)
Dept: CARDIOLOGY | Facility: CLINIC | Age: 68
End: 2023-12-01
Payer: MEDICARE

## 2023-12-07 ENCOUNTER — OFFICE VISIT (OUTPATIENT)
Dept: INTERNAL MEDICINE | Facility: CLINIC | Age: 68
End: 2023-12-07
Payer: MEDICARE

## 2023-12-07 ENCOUNTER — TELEPHONE (OUTPATIENT)
Dept: INTERNAL MEDICINE | Facility: CLINIC | Age: 68
End: 2023-12-07

## 2023-12-07 ENCOUNTER — PATIENT MESSAGE (OUTPATIENT)
Dept: INTERNAL MEDICINE | Facility: CLINIC | Age: 68
End: 2023-12-07

## 2023-12-07 VITALS
SYSTOLIC BLOOD PRESSURE: 132 MMHG | WEIGHT: 226.63 LBS | DIASTOLIC BLOOD PRESSURE: 76 MMHG | BODY MASS INDEX: 30.04 KG/M2 | HEART RATE: 63 BPM | HEIGHT: 73 IN

## 2023-12-07 DIAGNOSIS — R73.03 PREDIABETES: ICD-10-CM

## 2023-12-07 DIAGNOSIS — L98.9 SKIN LESION: ICD-10-CM

## 2023-12-07 DIAGNOSIS — L23.9 ALLERGIC DERMATITIS: ICD-10-CM

## 2023-12-07 DIAGNOSIS — Z95.1 HISTORY OF CORONARY ARTERY BYPASS SURGERY: ICD-10-CM

## 2023-12-07 DIAGNOSIS — Z87.898 HISTORY OF CHEST PAIN: ICD-10-CM

## 2023-12-07 DIAGNOSIS — Z12.5 SCREENING FOR MALIGNANT NEOPLASM OF PROSTATE: ICD-10-CM

## 2023-12-07 DIAGNOSIS — R94.5 ABNORMAL RESULTS OF LIVER FUNCTION STUDIES: ICD-10-CM

## 2023-12-07 DIAGNOSIS — Z00.00 PREVENTATIVE HEALTH CARE: ICD-10-CM

## 2023-12-07 DIAGNOSIS — I10 PRIMARY HYPERTENSION: Primary | ICD-10-CM

## 2023-12-07 DIAGNOSIS — Z01.818 PREOP EXAMINATION: Primary | ICD-10-CM

## 2023-12-07 DIAGNOSIS — Z91.89 AT RISK FOR BLEEDING: ICD-10-CM

## 2023-12-07 DIAGNOSIS — Z53.39 OTHER SPECIFIED PROCEDURE CONVERTED TO OPEN PROCEDURE: ICD-10-CM

## 2023-12-07 DIAGNOSIS — E78.00 HYPERCHOLESTEROLEMIA: ICD-10-CM

## 2023-12-07 DIAGNOSIS — I25.10 CORONARY ARTERY DISEASE INVOLVING NATIVE CORONARY ARTERY OF NATIVE HEART WITHOUT ANGINA PECTORIS: ICD-10-CM

## 2023-12-07 PROCEDURE — 99999 PR PBB SHADOW E&M-EST. PATIENT-LVL III: ICD-10-PCS | Mod: PBBFAC,,, | Performed by: STUDENT IN AN ORGANIZED HEALTH CARE EDUCATION/TRAINING PROGRAM

## 2023-12-07 PROCEDURE — 1126F PR PAIN SEVERITY QUANTIFIED, NO PAIN PRESENT: ICD-10-PCS | Mod: CPTII,S$GLB,, | Performed by: STUDENT IN AN ORGANIZED HEALTH CARE EDUCATION/TRAINING PROGRAM

## 2023-12-07 PROCEDURE — 4010F ACE/ARB THERAPY RXD/TAKEN: CPT | Mod: CPTII,S$GLB,, | Performed by: STUDENT IN AN ORGANIZED HEALTH CARE EDUCATION/TRAINING PROGRAM

## 2023-12-07 PROCEDURE — 3288F FALL RISK ASSESSMENT DOCD: CPT | Mod: CPTII,S$GLB,, | Performed by: STUDENT IN AN ORGANIZED HEALTH CARE EDUCATION/TRAINING PROGRAM

## 2023-12-07 PROCEDURE — 3075F SYST BP GE 130 - 139MM HG: CPT | Mod: CPTII,S$GLB,, | Performed by: STUDENT IN AN ORGANIZED HEALTH CARE EDUCATION/TRAINING PROGRAM

## 2023-12-07 PROCEDURE — 99214 OFFICE O/P EST MOD 30 MIN: CPT | Mod: S$GLB,,, | Performed by: STUDENT IN AN ORGANIZED HEALTH CARE EDUCATION/TRAINING PROGRAM

## 2023-12-07 PROCEDURE — 4010F PR ACE/ARB THEARPY RXD/TAKEN: ICD-10-PCS | Mod: CPTII,S$GLB,, | Performed by: STUDENT IN AN ORGANIZED HEALTH CARE EDUCATION/TRAINING PROGRAM

## 2023-12-07 PROCEDURE — 1101F PT FALLS ASSESS-DOCD LE1/YR: CPT | Mod: CPTII,S$GLB,, | Performed by: STUDENT IN AN ORGANIZED HEALTH CARE EDUCATION/TRAINING PROGRAM

## 2023-12-07 PROCEDURE — 1159F PR MEDICATION LIST DOCUMENTED IN MEDICAL RECORD: ICD-10-PCS | Mod: CPTII,S$GLB,, | Performed by: STUDENT IN AN ORGANIZED HEALTH CARE EDUCATION/TRAINING PROGRAM

## 2023-12-07 PROCEDURE — 99999 PR PBB SHADOW E&M-EST. PATIENT-LVL III: CPT | Mod: PBBFAC,,, | Performed by: STUDENT IN AN ORGANIZED HEALTH CARE EDUCATION/TRAINING PROGRAM

## 2023-12-07 PROCEDURE — 3078F PR MOST RECENT DIASTOLIC BLOOD PRESSURE < 80 MM HG: ICD-10-PCS | Mod: CPTII,S$GLB,, | Performed by: STUDENT IN AN ORGANIZED HEALTH CARE EDUCATION/TRAINING PROGRAM

## 2023-12-07 PROCEDURE — 99214 PR OFFICE/OUTPT VISIT, EST, LEVL IV, 30-39 MIN: ICD-10-PCS | Mod: S$GLB,,, | Performed by: STUDENT IN AN ORGANIZED HEALTH CARE EDUCATION/TRAINING PROGRAM

## 2023-12-07 PROCEDURE — 3008F PR BODY MASS INDEX (BMI) DOCUMENTED: ICD-10-PCS | Mod: CPTII,S$GLB,, | Performed by: STUDENT IN AN ORGANIZED HEALTH CARE EDUCATION/TRAINING PROGRAM

## 2023-12-07 PROCEDURE — 3044F HG A1C LEVEL LT 7.0%: CPT | Mod: CPTII,S$GLB,, | Performed by: STUDENT IN AN ORGANIZED HEALTH CARE EDUCATION/TRAINING PROGRAM

## 2023-12-07 PROCEDURE — 1101F PR PT FALLS ASSESS DOC 0-1 FALLS W/OUT INJ PAST YR: ICD-10-PCS | Mod: CPTII,S$GLB,, | Performed by: STUDENT IN AN ORGANIZED HEALTH CARE EDUCATION/TRAINING PROGRAM

## 2023-12-07 PROCEDURE — 3078F DIAST BP <80 MM HG: CPT | Mod: CPTII,S$GLB,, | Performed by: STUDENT IN AN ORGANIZED HEALTH CARE EDUCATION/TRAINING PROGRAM

## 2023-12-07 PROCEDURE — 3044F PR MOST RECENT HEMOGLOBIN A1C LEVEL <7.0%: ICD-10-PCS | Mod: CPTII,S$GLB,, | Performed by: STUDENT IN AN ORGANIZED HEALTH CARE EDUCATION/TRAINING PROGRAM

## 2023-12-07 PROCEDURE — 3288F PR FALLS RISK ASSESSMENT DOCUMENTED: ICD-10-PCS | Mod: CPTII,S$GLB,, | Performed by: STUDENT IN AN ORGANIZED HEALTH CARE EDUCATION/TRAINING PROGRAM

## 2023-12-07 PROCEDURE — 3075F PR MOST RECENT SYSTOLIC BLOOD PRESS GE 130-139MM HG: ICD-10-PCS | Mod: CPTII,S$GLB,, | Performed by: STUDENT IN AN ORGANIZED HEALTH CARE EDUCATION/TRAINING PROGRAM

## 2023-12-07 PROCEDURE — 1126F AMNT PAIN NOTED NONE PRSNT: CPT | Mod: CPTII,S$GLB,, | Performed by: STUDENT IN AN ORGANIZED HEALTH CARE EDUCATION/TRAINING PROGRAM

## 2023-12-07 PROCEDURE — 1159F MED LIST DOCD IN RCRD: CPT | Mod: CPTII,S$GLB,, | Performed by: STUDENT IN AN ORGANIZED HEALTH CARE EDUCATION/TRAINING PROGRAM

## 2023-12-07 PROCEDURE — 3008F BODY MASS INDEX DOCD: CPT | Mod: CPTII,S$GLB,, | Performed by: STUDENT IN AN ORGANIZED HEALTH CARE EDUCATION/TRAINING PROGRAM

## 2023-12-07 RX ORDER — TRIAMCINOLONE ACETONIDE 5 MG/G
CREAM TOPICAL 2 TIMES DAILY
Qty: 15 G | Refills: 0 | Status: SHIPPED | OUTPATIENT
Start: 2023-12-07 | End: 2023-12-21

## 2023-12-07 NOTE — PROGRESS NOTES
[facility]    Subjective:       Patient ID: Richie Johnson is a 68 y.o. male.    Chief Complaint: Hypertension (F/u)      History was obtained from the patient and supplemented through chart review.  This patient is known to me. Prior was Dr. Barrett ([facility]).     HPI:    Patient is a 68 y.o. male w/ pmhx HTN, CAD s/p CABG, prediabetes who presents for annual.    HTN  Well-controlled  Cont ramipril 10    Skin lesions, needs to return to derm  Long standing skin lesion on left arm, pruritus on posterior neck as well  Saw Dr. Davis in the past, interested in [facility]    CAD s/p CABG  Dr. Hawk   No MI, taking ASA daily,    HLD  crestor 40 + coenzyme Q10  Some elevated ck    Seeing podiatrist    S/p rotator cuff surgery, knee pain    Following with ortho    Cut back on heavy etoh drinking  Cut back Sunday through Tues  Cut drinking in half  Down approx 1 drink a day on other day    Discussed R/B checking PSA    Wt Readings from Last 15 Encounters:   12/07/23 102.8 kg (226 lb 10.1 oz)   11/30/23 101.8 kg (224 lb 5.1 oz)   11/27/23 103.7 kg (228 lb 9.9 oz)   09/14/23 97.1 kg (214 lb)   07/13/23 97.3 kg (214 lb 8.1 oz)   06/21/23 96.1 kg (211 lb 13.8 oz)   06/07/23 97 kg (213 lb 13.5 oz)   01/12/23 95.8 kg (211 lb 5 oz)   11/09/22 94 kg (207 lb 5.5 oz)   07/07/22 96.5 kg (212 lb 11.9 oz)   03/24/22 100.9 kg (222 lb 7.1 oz)   01/13/22 102 kg (224 lb 13.9 oz)   10/27/21 97.5 kg (215 lb)   10/26/21 97.5 kg (215 lb)   09/16/21 100 kg (220 lb 7.4 oz)        Medical History  Past Medical History:   Diagnosis Date    Back ache 4/2/2013    Back surgery 10/2012    Coronary atherosclerosis of native coronary artery 4/2/2013 2/28/2013. 3 V CAD. CABG 3/4/2013: BRUNSON LAD, SVG1 D1, SVG2 RI & OM1, SVG3 RCA    Hx of CABG 4/2/2013    Hypercholesterolemia 4/2/2013    Left bundle branch block 8/30/2016 8/30/2016: Diagnosed.    Overweight 8/22/2018 8/22/2018: Weight 96 kg. BMI 28.        Review of Systems   Constitutional:  Negative for appetite change and diaphoresis.   HENT:  Negative for trouble swallowing.    Respiratory:  Negative for shortness of breath.    Cardiovascular:  Negative for chest pain.   Gastrointestinal:  Negative for diarrhea, nausea and vomiting.   Musculoskeletal:  Negative for gait problem.   Skin:  Positive for color change.   Neurological:  Negative for dizziness and seizures.   Psychiatric/Behavioral:  Negative for hallucinations.          Surgical hx, family hx, social hx   Have been reviewed      Current Outpatient Medications:     aspirin (ECOTRIN) 81 MG EC tablet, Take 1 tablet (81 mg total) by mouth once daily., Disp: 90 tablet, Rfl: 3    CALCIUM CARBONATE/VITAMIN D3 (VITAMIN D-3 ORAL), Take 2,000 Int'l Units by mouth once daily., Disp: , Rfl:     ezetimibe (ZETIA) 10 mg tablet, Take 1 tablet (10 mg total) by mouth once daily., Disp: 90 tablet, Rfl: 3    multivitamin (THERAGRAN) per tablet, Take 1 tablet by mouth once daily., Disp: , Rfl:     ramipriL (ALTACE) 10 MG capsule, Take 1 capsule (10 mg total) by mouth once daily., Disp: 90 capsule, Rfl: 3    rosuvastatin (CRESTOR) 40 MG Tab, Take 1 tablet (40 mg total) by mouth once daily., Disp: 90 tablet, Rfl: 3    UBIDECARENONE (COQ-10 ORAL), Take 300 mg by mouth once daily. , Disp: , Rfl:     naproxen sodium (ANAPROX) 220 MG tablet, Take 220 mg by mouth 2 (two) times daily with meals., Disp: , Rfl:     pneumoc 20-migel conj-dip cr,PF, (PREVNAR 20, PF,) 0.5 mL Syrg injection, Inject into the muscle., Disp: 0.5 mL, Rfl: 0    triamcinolone acetonide 0.5% (KENALOG) 0.5 % Crea, Apply topically 2 (two) times daily. for 14 days, Disp: 15 g, Rfl: 0  No current facility-administered medications for this visit.    Facility-Administered Medications Ordered in Other Visits:     0.9%  NaCl infusion, , Intravenous, Continuous, Saul Gonzalez MD    Objective:        Body mass index is 29.9 kg/m².  Vitals:    12/07/23 0756  "  BP: 132/76   Pulse: 63   SpO2: (P) 98%   Weight: 102.8 kg (226 lb 10.1 oz)   Height: 6' 1" (1.854 m)   PainSc: 0-No pain     Physical Exam  Vitals and nursing note reviewed.   Constitutional:       General: He is not in acute distress.     Appearance: Normal appearance. He is not ill-appearing.   HENT:      Head: Normocephalic and atraumatic.   Eyes:      General: No scleral icterus.  Cardiovascular:      Rate and Rhythm: Normal rate and regular rhythm.      Heart sounds: Normal heart sounds.   Pulmonary:      Effort: Pulmonary effort is normal.   Musculoskeletal:         General: No deformity.   Skin:     Comments: Pink pearly lesions with excoriations crusted left forearm   Neurological:      Mental Status: He is alert and oriented to person, place, and time.   Psychiatric:         Behavior: Behavior normal.           Lab Results   Component Value Date    WBC 4.37 06/07/2023    HGB 14.2 06/07/2023    HCT 42.7 06/07/2023     06/07/2023    CHOL 151 06/07/2023    TRIG 85 06/07/2023    HDL 50 06/07/2023    ALT 35 06/07/2023    AST 34 06/07/2023     06/07/2023    K 4.3 06/07/2023     06/07/2023    CREATININE 0.9 06/07/2023    BUN 21 06/07/2023    CO2 24 06/07/2023    PSA 2.5 06/07/2023    INR 1.1 10/31/2012    HGBA1C 5.8 (H) 09/13/2023       The 10-year ASCVD risk score (Bhupinder RIVERA, et al., 2019) is: 16.4%    Values used to calculate the score:      Age: 68 years      Sex: Male      Is Non- : No      Diabetic: No      Tobacco smoker: No      Systolic Blood Pressure: 132 mmHg      Is BP treated: Yes      HDL Cholesterol: 50 mg/dL      Total Cholesterol: 151 mg/dL    On statin 40    (Imaging have been independently reviewed)    No AAA    Assessment:         1. Primary hypertension    2. Prediabetes    3. Coronary artery disease involving native coronary artery of native heart without angina pectoris    4. Hypercholesterolemia    5. History of coronary artery bypass surgery  "   6. History of chest pain    7. Preventative health care    8. At risk for bleeding    9. Screening for malignant neoplasm of prostate    10. Skin lesion    11. Allergic dermatitis            Plan:     Richie was seen today for hypertension.    Diagnoses and all orders for this visit:    Primary hypertension    Prediabetes  -     Cancel: Hemoglobin A1C; Future  -     Hemoglobin A1C; Future  -     Hemoglobin A1C; Future    Coronary artery disease involving native coronary artery of native heart without angina pectoris    Hypercholesterolemia  -     Lipid Panel; Future  -     TSH; Future    History of coronary artery bypass surgery    History of chest pain    Preventative health care  -     Comprehensive Metabolic Panel; Future    At risk for bleeding  -     CBC Auto Differential; Future    Screening for malignant neoplasm of prostate  -     PSA, Screening; Future    Skin lesion  -     Ambulatory referral/consult to Dermatology; Future    Allergic dermatitis  -     triamcinolone acetonide 0.5% (KENALOG) 0.5 % Crea; Apply topically 2 (two) times daily. for 14 days          Health Maintenance  - Lipids:   - A1C: prediabetes 5.8  - Colon Ca Screen: 2021 Dr. Valente 2021, due in 7 years, plans for just stool sample at that point  - Immunizations: shingles vaccinated; covid vaccine and booster   - PSA       Follow up in about 6 months (around 6/7/2024) for annual or sooner if needed. or sooner prn        All medications were reviewed including potential side effects and risks/benefits.  Pt was counseled to call back if anything worsens or if questions arise.    Minh Means MD  Family Medicine  [facility]  [address] [address]  Phone [phone]  Fax [phone]

## 2023-12-07 NOTE — TELEPHONE ENCOUNTER
----- Message from Lily Tierney MA sent at 12/7/2023 11:38 AM CST -----  Name of Who is Calling:SELIN MIRANDA [[id number]]                What is the request in detail: Pt states he needs a form filled out, lab work and a statement of surgical clearance FOR LEFT FOOT SURGERY on 1/5/24. Please assist.                Can the clinic reply by [facility]: No                What Number to Call Back if not in [facility]: [phone]

## 2023-12-08 ENCOUNTER — PATIENT MESSAGE (OUTPATIENT)
Dept: CARDIOLOGY | Facility: CLINIC | Age: 68
End: 2023-12-08
Payer: MEDICARE

## 2023-12-08 DIAGNOSIS — Z95.1 HISTORY OF CORONARY ARTERY BYPASS SURGERY: ICD-10-CM

## 2023-12-08 DIAGNOSIS — I45.10 RIGHT BUNDLE BRANCH BLOCK: ICD-10-CM

## 2023-12-08 DIAGNOSIS — Z01.818 PRE-OPERATIVE CLEARANCE: Primary | ICD-10-CM

## 2023-12-08 NOTE — PROGRESS NOTES
Note addended due to surg upcoming Jan 5th with podiatrist Dr. Ariane Ruiz    Pt is a 68 y.o. male who presents for a pre-operative clearance prior to surgery.  He denies any personal or family history of chronic lung disease, bleeding disorders, hypercoaguable disorders, blood clots, pulmonary embolisms, difficulty with anesthesia or sudden death.  Patient is able to walk up a flight of stairs without difficulty breathing, functional capacity > 4 mets.  Denies orthopnea, pnd, CP , SOB.    Pt has no history of DM and is a former smoker .     Cardiology has stated 12/8/2023 that he is a low operative risk.    Revised [place] Cardiac Risk Index - low risk for serious cardiac event 2/2 planned surgery based on the following parameters                        - low risk surgery                        - + history of ischemic heart disease                        - no hx of heart failure                        - no history of CVA                        - no history of DM treated with insulin                        - preoperative Scr <2.0        No further testing required prior to surgery in this patient who is at an acceptable risk for cardiac complications resulting from this low risk procedure    Will be getting EKG performed and labs the week before the surgery

## 2023-12-14 ENCOUNTER — HOSPITAL ENCOUNTER (OUTPATIENT)
Dept: CARDIOLOGY | Facility: OTHER | Age: 68
Discharge: HOME | End: 2023-12-14
Attending: INTERNAL MEDICINE
Payer: MEDICARE

## 2023-12-14 DIAGNOSIS — I45.10 RIGHT BUNDLE BRANCH BLOCK: ICD-10-CM

## 2023-12-14 DIAGNOSIS — Z95.1 HISTORY OF CORONARY ARTERY BYPASS SURGERY: ICD-10-CM

## 2023-12-14 DIAGNOSIS — Z01.818 PRE-OPERATIVE CLEARANCE: ICD-10-CM

## 2023-12-14 PROCEDURE — 93010 EKG 12-LEAD: ICD-10-PCS | Mod: ,,, | Performed by: INTERNAL MEDICINE

## 2023-12-14 PROCEDURE — 93005 ELECTROCARDIOGRAM TRACING: CPT

## 2023-12-14 PROCEDURE — 93010 ELECTROCARDIOGRAM REPORT: CPT | Mod: ,,, | Performed by: INTERNAL MEDICINE

## 2023-12-15 ENCOUNTER — TELEPHONE (OUTPATIENT)
Dept: CARDIOLOGY | Facility: CLINIC | Age: 68
End: 2023-12-15
Payer: MEDICARE

## 2023-12-15 ENCOUNTER — PATIENT MESSAGE (OUTPATIENT)
Dept: CARDIOLOGY | Facility: CLINIC | Age: 68
End: 2023-12-15
Payer: MEDICARE

## 2023-12-15 NOTE — TELEPHONE ENCOUNTER
Faxed EKG and clearance to [facility] at [phone] for surgery scheduled for 2/2/24.

## 2024-01-27 ENCOUNTER — PATIENT MESSAGE (OUTPATIENT)
Dept: INTERNAL MEDICINE | Facility: CLINIC | Age: 69
End: 2024-01-27
Payer: MEDICARE

## 2024-01-29 ENCOUNTER — TELEPHONE (OUTPATIENT)
Dept: INTERNAL MEDICINE | Facility: CLINIC | Age: 69
End: 2024-01-29
Payer: MEDICARE

## 2024-01-29 NOTE — TELEPHONE ENCOUNTER
----- Message from Opal Bennett sent at 1/29/2024  1:02 PM CST -----  Regarding: Returning Call                    Name of Who is Calling:  Richie Johnson    Who Left The Message:  Richie Johnson    Message Is For : Indu      What is the request in detail:       Patient called stating he's returning the Office's call and would like you to please call again. Please further advise. Thank you      Reply by [facility]: NO      Preferred Call Back  :  [phone] (X)

## 2024-02-05 ENCOUNTER — PATIENT MESSAGE (OUTPATIENT)
Dept: INTERNAL MEDICINE | Facility: CLINIC | Age: 69
End: 2024-02-05

## 2024-02-05 ENCOUNTER — OFFICE VISIT (OUTPATIENT)
Dept: INTERNAL MEDICINE | Facility: CLINIC | Age: 69
End: 2024-02-05
Payer: MEDICARE

## 2024-02-05 ENCOUNTER — LAB VISIT (OUTPATIENT)
Dept: LAB | Facility: OTHER | Age: 69
End: 2024-02-05
Attending: STUDENT IN AN ORGANIZED HEALTH CARE EDUCATION/TRAINING PROGRAM
Payer: MEDICARE

## 2024-02-05 VITALS
DIASTOLIC BLOOD PRESSURE: 71 MMHG | HEART RATE: 62 BPM | BODY MASS INDEX: 30.04 KG/M2 | SYSTOLIC BLOOD PRESSURE: 146 MMHG | WEIGHT: 226.63 LBS | HEIGHT: 73 IN

## 2024-02-05 DIAGNOSIS — Z01.810 PREOP CARDIOVASCULAR EXAM: ICD-10-CM

## 2024-02-05 DIAGNOSIS — Z01.810 PREOP CARDIOVASCULAR EXAM: Primary | ICD-10-CM

## 2024-02-05 LAB
ALBUMIN SERPL BCP-MCNC: 4.2 G/DL (ref 3.5–5.2)
ALP SERPL-CCNC: 49 U/L (ref 55–135)
ALT SERPL W/O P-5'-P-CCNC: 33 U/L (ref 10–44)
ANION GAP SERPL CALC-SCNC: 8 MMOL/L (ref 8–16)
APTT PPP: 26.2 SEC (ref 21–32)
AST SERPL-CCNC: 37 U/L (ref 10–40)
BASOPHILS # BLD AUTO: 0.04 K/UL (ref 0–0.2)
BASOPHILS NFR BLD: 0.7 % (ref 0–1.9)
BILIRUB SERPL-MCNC: 0.5 MG/DL (ref 0.1–1)
BUN SERPL-MCNC: 23 MG/DL (ref 8–23)
CALCIUM SERPL-MCNC: 9.5 MG/DL (ref 8.7–10.5)
CHLORIDE SERPL-SCNC: 104 MMOL/L (ref 95–110)
CO2 SERPL-SCNC: 27 MMOL/L (ref 23–29)
CREAT SERPL-MCNC: 0.9 MG/DL (ref 0.5–1.4)
DIFFERENTIAL METHOD BLD: ABNORMAL
EOSINOPHIL # BLD AUTO: 0.1 K/UL (ref 0–0.5)
EOSINOPHIL NFR BLD: 1.6 % (ref 0–8)
ERYTHROCYTE [DISTWIDTH] IN BLOOD BY AUTOMATED COUNT: 13.2 % (ref 11.5–14.5)
EST. GFR  (NO RACE VARIABLE): >60 ML/MIN/1.73 M^2
GLUCOSE SERPL-MCNC: 95 MG/DL (ref 70–110)
HCT VFR BLD AUTO: 41.8 % (ref 40–54)
HGB BLD-MCNC: 13.5 G/DL (ref 14–18)
IMM GRANULOCYTES # BLD AUTO: 0.05 K/UL (ref 0–0.04)
IMM GRANULOCYTES NFR BLD AUTO: 0.9 % (ref 0–0.5)
INR PPP: 1 (ref 0.8–1.2)
LYMPHOCYTES # BLD AUTO: 1.8 K/UL (ref 1–4.8)
LYMPHOCYTES NFR BLD: 31.4 % (ref 18–48)
MCH RBC QN AUTO: 29 PG (ref 27–31)
MCHC RBC AUTO-ENTMCNC: 32.3 G/DL (ref 32–36)
MCV RBC AUTO: 90 FL (ref 82–98)
MONOCYTES # BLD AUTO: 0.5 K/UL (ref 0.3–1)
MONOCYTES NFR BLD: 8.1 % (ref 4–15)
NEUTROPHILS # BLD AUTO: 3.3 K/UL (ref 1.8–7.7)
NEUTROPHILS NFR BLD: 57.3 % (ref 38–73)
NRBC BLD-RTO: 0 /100 WBC
PLATELET # BLD AUTO: 170 K/UL (ref 150–450)
PMV BLD AUTO: 10.2 FL (ref 9.2–12.9)
POTASSIUM SERPL-SCNC: 4 MMOL/L (ref 3.5–5.1)
PROT SERPL-MCNC: 6.6 G/DL (ref 6–8.4)
PROTHROMBIN TIME: 10.6 SEC (ref 9–12.5)
RBC # BLD AUTO: 4.66 M/UL (ref 4.6–6.2)
SODIUM SERPL-SCNC: 139 MMOL/L (ref 136–145)
WBC # BLD AUTO: 5.79 K/UL (ref 3.9–12.7)

## 2024-02-05 PROCEDURE — 85025 COMPLETE CBC W/AUTO DIFF WBC: CPT

## 2024-02-05 PROCEDURE — 36415 COLL VENOUS BLD VENIPUNCTURE: CPT

## 2024-02-05 PROCEDURE — 99213 OFFICE O/P EST LOW 20 MIN: CPT | Mod: S$GLB,,,

## 2024-02-05 PROCEDURE — 80053 COMPREHEN METABOLIC PANEL: CPT

## 2024-02-05 PROCEDURE — 85610 PROTHROMBIN TIME: CPT

## 2024-02-05 PROCEDURE — 85730 THROMBOPLASTIN TIME PARTIAL: CPT

## 2024-02-05 PROCEDURE — 99999 PR PBB SHADOW E&M-EST. PATIENT-LVL III: CPT | Mod: PBBFAC,,,

## 2024-02-05 NOTE — PROGRESS NOTES
Subjective     Patient ID: Richie Johnson is a 68 y.o. male.    Chief Complaint: No chief complaint on file.    Pt seen in clinic today for preop clearance. Needs EKG (done by cardiology already), cbc, cmp, pt/inr, ptt, and physical. Will order labs and fax labs and physical when results return.      Review of Systems   Constitutional:  Negative for activity change, fatigue and fever.   HENT: Negative.     Eyes: Negative.    Respiratory:  Negative for chest tightness, shortness of breath and wheezing.    Cardiovascular:  Negative for chest pain and palpitations.   Gastrointestinal:  Negative for change in bowel habit.   Endocrine: Negative for polydipsia and polyuria.   Genitourinary: Negative.    Musculoskeletal: Negative.    Integumentary:  Negative.   Neurological:  Negative for facial asymmetry and speech difficulty.   Psychiatric/Behavioral: Negative.            Objective     Physical Exam  Vitals reviewed.   Constitutional:       General: He is not in acute distress.     Appearance: He is well-developed. He is not diaphoretic.   HENT:      Head: Normocephalic and atraumatic.      Nose: Nose normal.   Eyes:      General:         Right eye: No discharge.         Left eye: No discharge.      Conjunctiva/sclera: Conjunctivae normal.      Pupils: Pupils are equal, round, and reactive to light.   Neck:      Thyroid: No thyromegaly.   Cardiovascular:      Rate and Rhythm: Normal rate and regular rhythm.      Pulses: Normal pulses.      Heart sounds: Normal heart sounds. No murmur heard.  Pulmonary:      Effort: Pulmonary effort is normal. No respiratory distress.      Breath sounds: Normal breath sounds. No wheezing.   Abdominal:      General: Abdomen is flat. There is no distension.      Palpations: Abdomen is soft.   Musculoskeletal:      Cervical back: Neck supple.   Skin:     General: Skin is warm and dry.      Findings: No rash.   Neurological:      Mental Status: He is alert and oriented to person, place, and  time.      Coordination: Coordination normal.   Psychiatric:         Behavior: Behavior normal.            Assessment and Plan     1. Preop cardiovascular exam  -     CBC Auto Differential; Future; Expected date: 02/05/2024  -     Comprehensive Metabolic Panel; Future; Expected date: 02/05/2024  -     PROTIME-INR; Future; Expected date: 02/05/2024  -     APTT; Future; Expected date: 02/05/2024                 No follow-ups on file.

## 2024-02-07 ENCOUNTER — TELEPHONE (OUTPATIENT)
Dept: INTERNAL MEDICINE | Facility: CLINIC | Age: 69
End: 2024-02-07
Payer: MEDICARE

## 2024-02-07 NOTE — TELEPHONE ENCOUNTER
----- Message from Stephani Bazan sent at 2/7/2024 11:35 AM CST -----  Name of Who is calling :    SELIN MIRANDA [[id number]] [facility]  What is the request in detail:    Terrie need pre labs and history and physical for pt. Please assist       Can the clinic reply by [facility]:  No           [facility][phone]

## 2024-02-08 ENCOUNTER — TELEPHONE (OUTPATIENT)
Dept: CARDIOLOGY | Facility: CLINIC | Age: 69
End: 2024-02-08
Payer: MEDICARE

## 2024-02-08 ENCOUNTER — PATIENT MESSAGE (OUTPATIENT)
Dept: INTERNAL MEDICINE | Facility: CLINIC | Age: 69
End: 2024-02-08
Payer: MEDICARE

## 2024-02-08 NOTE — TELEPHONE ENCOUNTER
----- Message from Padmini Chaudhary sent at 2/8/2024  8:23 AM CST -----  Regarding: surgery clearance  Name of Who is Calling: Terrie/ Dr Baig          What is the request in detail: Dr Baig's office is calling to see if pt is clear to have surgery. Please fax the clearance to [phone]. Any questions please call [phone]        Can the clinic reply by [facility]:          What Number to Call Back if not in [facility]:

## 2024-02-09 ENCOUNTER — TELEPHONE (OUTPATIENT)
Dept: INTERNAL MEDICINE | Facility: CLINIC | Age: 69
End: 2024-02-09
Payer: MEDICARE

## 2024-02-09 NOTE — TELEPHONE ENCOUNTER
----- Message from Antonio Bustamante sent at 2/8/2024  4:11 PM CST -----  Regarding: Patient advice  Contact: Pt  Pt called in regards to questions/concerns about a procedure for tomorrow       Pt can be reached at [phone]

## 2024-02-09 NOTE — TELEPHONE ENCOUNTER
"Pt was under impression paperwork wasn't faxed to facility to have foot surgery today.  Chart checking revealed:    "Faxed clinic notes and labs for pre-op to [facility] f-[phone]"    Tried to call pt to inform but no answer. Sent a portal msg.  "

## 2024-02-09 NOTE — TELEPHONE ENCOUNTER
Handled in another encounter per message below:    February 9, 2024  Minh Means MD   to SELIN Johnson         2/9/24  7:53 AM  Oh good. So sorry.  Not sure what happened.     Good luck    This Patient Portal message has not been read.  February 8, 2024  SELIN Johnson   to IAN Thakkar Staff (supporting Minh Means MD)         2/8/24  5:01 PM  Please ignore previous email.  I went by office around 4:15 and they gave me a copy of what had been faxed earlier in the week.  Dr. Baig said I could bring it with me in the morning.        previously downloaded required lab test results.     Crisis averted.

## 2024-02-28 ENCOUNTER — PATIENT MESSAGE (OUTPATIENT)
Dept: INTERNAL MEDICINE | Facility: CLINIC | Age: 69
End: 2024-02-28
Payer: MEDICARE

## 2024-02-29 VITALS — DIASTOLIC BLOOD PRESSURE: 70 MMHG | SYSTOLIC BLOOD PRESSURE: 148 MMHG

## 2024-03-04 ENCOUNTER — TELEPHONE (OUTPATIENT)
Dept: ADMINISTRATIVE | Facility: HOSPITAL | Age: 69
End: 2024-03-04
Payer: MEDICARE

## 2024-03-04 ENCOUNTER — PATIENT MESSAGE (OUTPATIENT)
Dept: ADMINISTRATIVE | Facility: HOSPITAL | Age: 69
End: 2024-03-04
Payer: MEDICARE

## 2024-03-04 VITALS — SYSTOLIC BLOOD PRESSURE: 131 MMHG | DIASTOLIC BLOOD PRESSURE: 72 MMHG

## 2024-04-05 DIAGNOSIS — L23.9 ALLERGIC DERMATITIS: ICD-10-CM

## 2024-04-05 NOTE — TELEPHONE ENCOUNTER
No care due was identified.  [facility] Embedded Care Due Messages. Reference number: [id number].   4/05/2024 12:10:09 PM CDT

## 2024-04-06 RX ORDER — TRIAMCINOLONE ACETONIDE 5 MG/G
CREAM TOPICAL
Qty: 15 G | Refills: 1 | Status: SHIPPED | OUTPATIENT
Start: 2024-04-06

## 2024-04-06 NOTE — TELEPHONE ENCOUNTER
Refill Authorization Note     Refill Decision Note   Richie Johnson  is requesting a refill authorization.  Brief Assessment and Rationale for Refill:  Approve     Medication Therapy Plan:       Medication Reconciliation Completed: No   Comments:     No Care Gaps recommended.     Note composed:1:12 PM 04/06/2024

## 2024-05-29 ENCOUNTER — PATIENT MESSAGE (OUTPATIENT)
Dept: INTERNAL MEDICINE | Facility: CLINIC | Age: 69
End: 2024-05-29
Payer: MEDICARE

## 2024-05-29 ENCOUNTER — PATIENT MESSAGE (OUTPATIENT)
Dept: CARDIOLOGY | Facility: CLINIC | Age: 69
End: 2024-05-29
Payer: MEDICARE

## 2024-05-30 ENCOUNTER — OFFICE VISIT (OUTPATIENT)
Dept: CARDIOLOGY | Facility: CLINIC | Age: 69
End: 2024-05-30
Attending: INTERNAL MEDICINE
Payer: MEDICARE

## 2024-05-30 VITALS
SYSTOLIC BLOOD PRESSURE: 122 MMHG | OXYGEN SATURATION: 96 % | WEIGHT: 224.44 LBS | HEIGHT: 73 IN | HEART RATE: 56 BPM | DIASTOLIC BLOOD PRESSURE: 68 MMHG | BODY MASS INDEX: 29.74 KG/M2

## 2024-05-30 DIAGNOSIS — Z87.898 HISTORY OF CHEST PAIN: ICD-10-CM

## 2024-05-30 DIAGNOSIS — I25.10 CORONARY ARTERY DISEASE INVOLVING NATIVE CORONARY ARTERY OF NATIVE HEART WITHOUT ANGINA PECTORIS: ICD-10-CM

## 2024-05-30 DIAGNOSIS — I10 PRIMARY HYPERTENSION: ICD-10-CM

## 2024-05-30 DIAGNOSIS — M54.50 CHRONIC MIDLINE LOW BACK PAIN WITHOUT SCIATICA: ICD-10-CM

## 2024-05-30 DIAGNOSIS — E66.3 OVERWEIGHT: ICD-10-CM

## 2024-05-30 DIAGNOSIS — I45.10 RIGHT BUNDLE BRANCH BLOCK: ICD-10-CM

## 2024-05-30 DIAGNOSIS — G89.29 CHRONIC MIDLINE LOW BACK PAIN WITHOUT SCIATICA: ICD-10-CM

## 2024-05-30 DIAGNOSIS — Z95.1 HISTORY OF CORONARY ARTERY BYPASS SURGERY: ICD-10-CM

## 2024-05-30 DIAGNOSIS — E78.00 HYPERCHOLESTEROLEMIA: ICD-10-CM

## 2024-05-30 PROCEDURE — 3008F BODY MASS INDEX DOCD: CPT | Mod: CPTII,S$GLB,, | Performed by: INTERNAL MEDICINE

## 2024-05-30 PROCEDURE — 4010F ACE/ARB THERAPY RXD/TAKEN: CPT | Mod: CPTII,S$GLB,, | Performed by: INTERNAL MEDICINE

## 2024-05-30 PROCEDURE — 99214 OFFICE O/P EST MOD 30 MIN: CPT | Mod: S$GLB,,, | Performed by: INTERNAL MEDICINE

## 2024-05-30 PROCEDURE — 99999 PR PBB SHADOW E&M-EST. PATIENT-LVL III: CPT | Mod: PBBFAC,,, | Performed by: INTERNAL MEDICINE

## 2024-05-30 PROCEDURE — 1126F AMNT PAIN NOTED NONE PRSNT: CPT | Mod: CPTII,S$GLB,, | Performed by: INTERNAL MEDICINE

## 2024-05-30 PROCEDURE — 1160F RVW MEDS BY RX/DR IN RCRD: CPT | Mod: CPTII,S$GLB,, | Performed by: INTERNAL MEDICINE

## 2024-05-30 PROCEDURE — 1159F MED LIST DOCD IN RCRD: CPT | Mod: CPTII,S$GLB,, | Performed by: INTERNAL MEDICINE

## 2024-05-30 PROCEDURE — 3074F SYST BP LT 130 MM HG: CPT | Mod: CPTII,S$GLB,, | Performed by: INTERNAL MEDICINE

## 2024-05-30 PROCEDURE — 3288F FALL RISK ASSESSMENT DOCD: CPT | Mod: CPTII,S$GLB,, | Performed by: INTERNAL MEDICINE

## 2024-05-30 PROCEDURE — 3078F DIAST BP <80 MM HG: CPT | Mod: CPTII,S$GLB,, | Performed by: INTERNAL MEDICINE

## 2024-05-30 PROCEDURE — 1101F PT FALLS ASSESS-DOCD LE1/YR: CPT | Mod: CPTII,S$GLB,, | Performed by: INTERNAL MEDICINE

## 2024-05-30 NOTE — PROGRESS NOTES
"Subjective:     Richie Johnson is a 69 y.o.male with hypertension and hypercholesterolemia. He is overweight. He has severe coronary artery disease and underwent coronary artery bypass grafting on 3/4/2013 receiving five grafts. He had some chest discomfort in 2016 and then had epigastric pain 6/2018. In 11/2019 he began experience epigastric discomfort and left arm pain. No clear exertional chest pain and no clear association with playing tennis. On 2/7/2020 he underwent a stress echocardiogram and was able 10:00 minutes on the treadmill. There was no chest pain. The electrocardiogram was "negative" with a right bundle branch block. The echocardiogram was "negative" as well with normal left ventricular size and systolic function. He may have had myalgia on statin in the past but appears to tolerate rosuvastatin well. In 2021 he ha some neck pain. No exertional chest pain or exertional dyspnea. No palpitations or weak spells. No issues with any of his prescribed medications. Feeling well overall.      Coronary Artery Disease  Presents for follow-up visit. The disease course has been stable. The condition has lasted for  years. Pertinent negatives include no chest pain, chest pressure, chest tightness, dizziness, leg swelling, muscle weakness, palpitations, shortness of breath or weight gain. Risk factors include hyperlipidemia. Risk factors do not include decreased physical activity, diabetes, hypertension or obesity. The symptoms have been stable.   Chest Pain   This is a chronic problem. The current episode started more than 1 month ago. The problem has been resolved. Associated symptoms include back pain. Pertinent negatives include no abdominal pain, claudication, cough, diaphoresis, dizziness, exertional chest pressure, fever, headaches, hemoptysis, irregular heartbeat, leg pain, lower extremity edema, malaise/fatigue, nausea, near-syncope, numbness, orthopnea, palpitations, PND, shortness of breath, sputum " production, syncope, vomiting or weakness.   His past medical history is significant for CAD and hyperlipidemia.   Pertinent negatives for past medical history include no diabetes, no hypertension and no muscle weakness.   Hypertension  The problem is unchanged. The problem is controlled (usually 120-130/60-75 mmHg at home). Associated symptoms include neck pain. Pertinent negatives include no anxiety, blurred vision, chest pain, headaches, malaise/fatigue, orthopnea, palpitations, PND, shortness of breath or sweats. There is no history of chronic renal disease.   Hyperlipidemia  This is a chronic problem. The current episode started more than 1 year ago. The problem is controlled. Recent lipid tests were reviewed and are normal. He has no history of chronic renal disease, diabetes, hypothyroidism, liver disease, obesity or nephrotic syndrome. Pertinent negatives include no chest pain, focal sensory loss, focal weakness, leg pain, myalgias or shortness of breath.       Review of Systems   Constitutional: Negative for diaphoresis, fever, malaise/fatigue and weight gain.   HENT:  Negative for nosebleeds.    Eyes:  Negative for blurred vision, double vision, pain, vision loss in left eye and vision loss in right eye.   Cardiovascular:  Negative for chest pain, claudication, dyspnea on exertion, irregular heartbeat, leg swelling, near-syncope, orthopnea, palpitations, paroxysmal nocturnal dyspnea and syncope.   Respiratory:  Negative for chest tightness, cough, hemoptysis, shortness of breath, sputum production and wheezing.    Endocrine: Negative for cold intolerance and heat intolerance.   Hematologic/Lymphatic: Negative for bleeding problem. Does not bruise/bleed easily.   Skin:  Negative for color change and itching.   Musculoskeletal:  Positive for back pain and neck pain. Negative for falls, muscle weakness and myalgias.   Gastrointestinal:  Negative for abdominal pain, flatus, heartburn, hematemesis,  "hematochezia, hemorrhoids, jaundice, melena, nausea and vomiting.   Genitourinary:  Negative for dysuria and hematuria.   Neurological:  Negative for dizziness, focal weakness, headaches, light-headedness, loss of balance, numbness, sensory change, tremors and weakness.   Psychiatric/Behavioral:  Negative for altered mental status, depression and memory loss. The patient is not nervous/anxious.    Allergic/Immunologic: Negative for hives and persistent infections.       Current Outpatient Medications on File Prior to Visit   Medication Sig Dispense Refill    aspirin (ECOTRIN) 81 MG EC tablet Take 1 tablet (81 mg total) by mouth once daily. 90 tablet 3    CALCIUM CARBONATE/VITAMIN D3 (VITAMIN D-3 ORAL) Take 2,000 Int'l Units by mouth once daily.      ezetimibe (ZETIA) 10 mg tablet Take 1 tablet (10 mg total) by mouth once daily. 90 tablet 3    multivitamin (THERAGRAN) per tablet Take 1 tablet by mouth once daily.      naproxen sodium (ANAPROX) 220 MG tablet Take 220 mg by mouth 2 (two) times daily with meals.      ramipriL (ALTACE) 10 MG capsule Take 1 capsule (10 mg total) by mouth once daily. 90 capsule 3    rosuvastatin (CRESTOR) 40 MG Tab Take 1 tablet (40 mg total) by mouth once daily. 90 tablet 3    triamcinolone acetonide 0.5% (KENALOG) 0.5 % Crea APPLY TOPICALLY 2 TIMES DAILY. FOR 14 DAYS. 15 g 1    UBIDECARENONE (COQ-10 ORAL) Take 300 mg by mouth once daily.        Current Facility-Administered Medications on File Prior to Visit   Medication Dose Route Frequency Provider Last Rate Last Admin    0.9%  NaCl infusion   Intravenous Continuous Saul Gonzalez MD           /68   Pulse (!) 56   Ht 6' 1" (1.854 m)   Wt 101.8 kg (224 lb 6.9 oz)   SpO2 96%   BMI 29.61 kg/m²     Objective:     Physical Exam  Constitutional:       General: He is not in acute distress.     Appearance: Normal appearance. He is well-developed. He is not diaphoretic.   HENT:      Head: Normocephalic and atraumatic.   Eyes:    "   General: No scleral icterus.        Right eye: No discharge.         Left eye: No discharge.      Conjunctiva/sclera: Conjunctivae normal.   Neck:      Thyroid: No thyromegaly.      Vascular: No carotid bruit or JVD.   Cardiovascular:      Rate and Rhythm: Normal rate and regular rhythm.      Pulses:           Radial pulses are 2+ on the right side and 2+ on the left side.        Dorsalis pedis pulses are 2+ on the right side and 2+ on the left side.        Posterior tibial pulses are 2+ on the right side and 2+ on the left side.      Heart sounds: S1 normal and S2 normal. No murmur heard.     Gallop present. S4 sounds present. No S3 sounds.   Pulmonary:      Effort: Pulmonary effort is normal.      Breath sounds: Normal breath sounds.   Abdominal:      Palpations: Abdomen is soft.      Tenderness: There is no abdominal tenderness.   Musculoskeletal:      Cervical back: Neck supple.      Right ankle: No swelling, deformity or ecchymosis.      Left ankle: No swelling, deformity or ecchymosis.   Lymphadenopathy:      Head:      Right side of head: No submandibular adenopathy.      Left side of head: No submandibular adenopathy.      Cervical: No cervical adenopathy.   Skin:     General: Skin is warm and dry.      Findings: No rash.   Neurological:      General: No focal deficit present.      Mental Status: He is alert and oriented to person, place, and time.   Psychiatric:         Attention and Perception: Attention and perception normal.         Mood and Affect: Mood and affect normal.         Speech: Speech normal.         Behavior: Behavior normal.         Thought Content: Thought content normal.         Cognition and Memory: Cognition and memory normal.         Judgment: Judgment normal.         Assessment:     1. Coronary artery disease involving native coronary artery of native heart without angina pectoris    2. History of coronary artery bypass surgery    3. History of chest pain    4. Right bundle branch  "block    5. Primary hypertension    6. Hypercholesterolemia    7. Overweight    8. Chronic midline low back pain without sciatica        Plan:      1. Coronary Artery Disease   2/28/2013: [facility]: Cath: 3V CAD.    3/4/2013: [facility]: CABG x 5: LIMA to LAD, SVG1 to D1, SVG2 to RI & OM1 and SVG3 to RCA.   2/7/2020: Stress Echo: 10:00 min. No CP. ECG "negative". RBBB. Echo: "negative". Normal left ventricular size and systolic function.   9/15/2023: Echo: Normal left ventricular size and systolic function. EF 65%. Moderate diastolic dysfunction. Moderately enlarged LA.    On aspirin 81 mg Q24.   Appears stable.     2. History of Chest Pain   8/15/2016: Experienced chest pain.   10/20/2016: Echo: Normal left ventricular size and systolic function. Mild LVH. Moderate diastolic dysfunction. Mildly dilated LA.   10/20/2016: Regadenoson MPI: Small moderately dense fixed inferoapical defect.   10/26/2018: Echo: Normal left ventricular size and systolic function. Mild LVH. Mildly dilated LA.   6/2018: Episode of epigastric pain.   11/2019: Recurrent pain.   Appeared atypical.   Resolved.     3. Right Bundle Branch Block   2/6/2020: NSR. RBBB.   7/13/2023: NSR. RBBB.   9/22/2023: Holter: Sinus rhythm 68 () bpm. Normal SC interval. Widened QRS with RBBB morphology. No pauses. Rare APC's - <1/hr. Rare VPC's - <1/hr. No reported symptoms.     4. Hypertension   8/22/2018: 135/79 mmHg.   4/24/2019: 159/86 mmHg.   6/18/2020: 137/79 mmHg.   9/17/2020: 152/80 mmHg.   9/17/2020: Ramipril 5 mg Q24 was begun.    5/13/2021: Ramipril 5 mg Q24 was increased to ramipril 10 mg Q24.   On ramipril 10 mg Q24.   Keeping log at home.   Well controlled.     5. Hypercholesterolemia   5/16/2013: Chol 209. HDL 43. . .   Was on atorvastatin 20 mg Q24 - unable to tolerate 40 mg Q24.   On rosuvastatin 40 mg Q24 and ezetimide 10 mg Q24.   12/10/2014: Chol 172. HDL 46. LDL 87. .   9/24/2015: Chol 148. HDL 50. LDL 78. TG 99.   9/20/2018: " Chol 124. HDL 48. LDL 65. TG 54.   3/30/2022: Chol 134. HDL 51. LDL 73. TG 50.   6/7/2023: Chol 151. HDL 50. LDL 84. TG 85.   On rosuvastatin 40 mg Q24 and ezetimibe 10 mg Q24.   Favorable lipid panel.   Try to improve diet and lose weight.   To do lipid panel.    6. Overweight   8/22/2018: Weight 96 kg. BMI 28.   11/30/2023: Weight 102 kg. BMI 30.   Encouraged to lose weight.    7. Back Pain   2012: Back Surgery.    8. Primary Care   Dr. Patricia Barrett.    F/u 6 months.    Tiffanie Hawk M.D.

## 2024-05-31 ENCOUNTER — PATIENT MESSAGE (OUTPATIENT)
Dept: CARDIOLOGY | Facility: CLINIC | Age: 69
End: 2024-05-31
Payer: MEDICARE

## 2024-08-28 ENCOUNTER — LAB VISIT (OUTPATIENT)
Dept: LAB | Facility: OTHER | Age: 69
End: 2024-08-28
Attending: STUDENT IN AN ORGANIZED HEALTH CARE EDUCATION/TRAINING PROGRAM
Payer: MEDICARE

## 2024-08-28 DIAGNOSIS — Z12.5 SCREENING FOR MALIGNANT NEOPLASM OF PROSTATE: ICD-10-CM

## 2024-08-28 DIAGNOSIS — E78.00 HYPERCHOLESTEROLEMIA: ICD-10-CM

## 2024-08-28 DIAGNOSIS — Z00.00 PREVENTATIVE HEALTH CARE: ICD-10-CM

## 2024-08-28 DIAGNOSIS — R73.03 PREDIABETES: ICD-10-CM

## 2024-08-28 DIAGNOSIS — Z91.89 AT RISK FOR BLEEDING: ICD-10-CM

## 2024-08-28 LAB
ALBUMIN SERPL BCP-MCNC: 4 G/DL (ref 3.5–5.2)
ALP SERPL-CCNC: 43 U/L (ref 55–135)
ALT SERPL W/O P-5'-P-CCNC: 41 U/L (ref 10–44)
ANION GAP SERPL CALC-SCNC: 8 MMOL/L (ref 8–16)
AST SERPL-CCNC: 39 U/L (ref 10–40)
BASOPHILS # BLD AUTO: 0.03 K/UL (ref 0–0.2)
BASOPHILS NFR BLD: 0.7 % (ref 0–1.9)
BILIRUB SERPL-MCNC: 0.6 MG/DL (ref 0.1–1)
BUN SERPL-MCNC: 23 MG/DL (ref 8–23)
CALCIUM SERPL-MCNC: 9 MG/DL (ref 8.7–10.5)
CHLORIDE SERPL-SCNC: 107 MMOL/L (ref 95–110)
CHOLEST SERPL-MCNC: 132 MG/DL (ref 120–199)
CHOLEST/HDLC SERPL: 3.1 {RATIO} (ref 2–5)
CO2 SERPL-SCNC: 24 MMOL/L (ref 23–29)
COMPLEXED PSA SERPL-MCNC: 2.8 NG/ML (ref 0–4)
CREAT SERPL-MCNC: 0.8 MG/DL (ref 0.5–1.4)
DIFFERENTIAL METHOD BLD: ABNORMAL
EOSINOPHIL # BLD AUTO: 0.1 K/UL (ref 0–0.5)
EOSINOPHIL NFR BLD: 2.5 % (ref 0–8)
ERYTHROCYTE [DISTWIDTH] IN BLOOD BY AUTOMATED COUNT: 13.5 % (ref 11.5–14.5)
EST. GFR  (NO RACE VARIABLE): >60 ML/MIN/1.73 M^2
ESTIMATED AVG GLUCOSE: 120 MG/DL (ref 68–131)
GLUCOSE SERPL-MCNC: 106 MG/DL (ref 70–110)
HBA1C MFR BLD: 5.8 % (ref 4–5.6)
HCT VFR BLD AUTO: 38.7 % (ref 40–54)
HDLC SERPL-MCNC: 42 MG/DL (ref 40–75)
HDLC SERPL: 31.8 % (ref 20–50)
HGB BLD-MCNC: 13 G/DL (ref 14–18)
IMM GRANULOCYTES # BLD AUTO: 0.02 K/UL (ref 0–0.04)
IMM GRANULOCYTES NFR BLD AUTO: 0.5 % (ref 0–0.5)
LDLC SERPL CALC-MCNC: 69.6 MG/DL (ref 63–159)
LYMPHOCYTES # BLD AUTO: 1.3 K/UL (ref 1–4.8)
LYMPHOCYTES NFR BLD: 32.4 % (ref 18–48)
MCH RBC QN AUTO: 29.3 PG (ref 27–31)
MCHC RBC AUTO-ENTMCNC: 33.6 G/DL (ref 32–36)
MCV RBC AUTO: 87 FL (ref 82–98)
MONOCYTES # BLD AUTO: 0.3 K/UL (ref 0.3–1)
MONOCYTES NFR BLD: 8.2 % (ref 4–15)
NEUTROPHILS # BLD AUTO: 2.2 K/UL (ref 1.8–7.7)
NEUTROPHILS NFR BLD: 55.7 % (ref 38–73)
NONHDLC SERPL-MCNC: 90 MG/DL
NRBC BLD-RTO: 0 /100 WBC
PLATELET # BLD AUTO: 168 K/UL (ref 150–450)
PMV BLD AUTO: 10 FL (ref 9.2–12.9)
POTASSIUM SERPL-SCNC: 3.7 MMOL/L (ref 3.5–5.1)
PROT SERPL-MCNC: 6.2 G/DL (ref 6–8.4)
RBC # BLD AUTO: 4.44 M/UL (ref 4.6–6.2)
SODIUM SERPL-SCNC: 139 MMOL/L (ref 136–145)
TRIGL SERPL-MCNC: 102 MG/DL (ref 30–150)
TSH SERPL DL<=0.005 MIU/L-ACNC: 1.1 UIU/ML (ref 0.4–4)
WBC # BLD AUTO: 4.01 K/UL (ref 3.9–12.7)

## 2024-08-28 PROCEDURE — 83036 HEMOGLOBIN GLYCOSYLATED A1C: CPT | Performed by: STUDENT IN AN ORGANIZED HEALTH CARE EDUCATION/TRAINING PROGRAM

## 2024-08-28 PROCEDURE — 80053 COMPREHEN METABOLIC PANEL: CPT | Performed by: STUDENT IN AN ORGANIZED HEALTH CARE EDUCATION/TRAINING PROGRAM

## 2024-08-28 PROCEDURE — 84153 ASSAY OF PSA TOTAL: CPT | Performed by: STUDENT IN AN ORGANIZED HEALTH CARE EDUCATION/TRAINING PROGRAM

## 2024-08-28 PROCEDURE — 85025 COMPLETE CBC W/AUTO DIFF WBC: CPT | Performed by: STUDENT IN AN ORGANIZED HEALTH CARE EDUCATION/TRAINING PROGRAM

## 2024-08-28 PROCEDURE — 84443 ASSAY THYROID STIM HORMONE: CPT | Performed by: STUDENT IN AN ORGANIZED HEALTH CARE EDUCATION/TRAINING PROGRAM

## 2024-08-28 PROCEDURE — 80061 LIPID PANEL: CPT | Performed by: STUDENT IN AN ORGANIZED HEALTH CARE EDUCATION/TRAINING PROGRAM

## 2024-09-03 ENCOUNTER — PATIENT MESSAGE (OUTPATIENT)
Dept: INTERNAL MEDICINE | Facility: CLINIC | Age: 69
End: 2024-09-03
Payer: MEDICARE

## 2024-09-03 ENCOUNTER — PATIENT MESSAGE (OUTPATIENT)
Dept: CARDIOLOGY | Facility: CLINIC | Age: 69
End: 2024-09-03
Payer: MEDICARE

## 2024-09-04 ENCOUNTER — LAB VISIT (OUTPATIENT)
Dept: LAB | Facility: OTHER | Age: 69
End: 2024-09-04
Attending: STUDENT IN AN ORGANIZED HEALTH CARE EDUCATION/TRAINING PROGRAM
Payer: MEDICARE

## 2024-09-04 ENCOUNTER — OFFICE VISIT (OUTPATIENT)
Dept: INTERNAL MEDICINE | Facility: CLINIC | Age: 69
End: 2024-09-04
Payer: MEDICARE

## 2024-09-04 VITALS
SYSTOLIC BLOOD PRESSURE: 132 MMHG | DIASTOLIC BLOOD PRESSURE: 68 MMHG | HEART RATE: 60 BPM | WEIGHT: 216.94 LBS | BODY MASS INDEX: 28.75 KG/M2 | OXYGEN SATURATION: 98 % | HEIGHT: 73 IN

## 2024-09-04 DIAGNOSIS — R20.9 UNSPECIFIED DISTURBANCES OF SKIN SENSATION: ICD-10-CM

## 2024-09-04 DIAGNOSIS — D64.9 ANEMIA, UNSPECIFIED TYPE: ICD-10-CM

## 2024-09-04 DIAGNOSIS — Z87.898 HISTORY OF CHEST PAIN: ICD-10-CM

## 2024-09-04 DIAGNOSIS — R73.03 PREDIABETES: ICD-10-CM

## 2024-09-04 DIAGNOSIS — Z95.1 HISTORY OF CORONARY ARTERY BYPASS SURGERY: ICD-10-CM

## 2024-09-04 DIAGNOSIS — L21.9 SEBORRHEIC DERMATITIS OF SCALP: ICD-10-CM

## 2024-09-04 DIAGNOSIS — Z00.00 ANNUAL PHYSICAL EXAM: Primary | ICD-10-CM

## 2024-09-04 DIAGNOSIS — I25.10 CORONARY ARTERY DISEASE INVOLVING NATIVE CORONARY ARTERY OF NATIVE HEART WITHOUT ANGINA PECTORIS: ICD-10-CM

## 2024-09-04 DIAGNOSIS — E78.00 HYPERCHOLESTEROLEMIA: ICD-10-CM

## 2024-09-04 DIAGNOSIS — I10 PRIMARY HYPERTENSION: ICD-10-CM

## 2024-09-04 DIAGNOSIS — L23.9 ALLERGIC DERMATITIS: ICD-10-CM

## 2024-09-04 LAB
FERRITIN SERPL-MCNC: 330 NG/ML (ref 20–300)
FOLATE SERPL-MCNC: 12.6 NG/ML (ref 4–24)
IRON SERPL-MCNC: 84 UG/DL (ref 45–160)
LDH SERPL L TO P-CCNC: 271 U/L (ref 110–260)
RETICS/RBC NFR AUTO: 1.6 % (ref 0.4–2)
SATURATED IRON: 23 % (ref 20–50)
TOTAL IRON BINDING CAPACITY: 373 UG/DL (ref 250–450)
TRANSFERRIN SERPL-MCNC: 252 MG/DL (ref 200–375)
VIT B12 SERPL-MCNC: 616 PG/ML (ref 210–950)

## 2024-09-04 PROCEDURE — 36415 COLL VENOUS BLD VENIPUNCTURE: CPT | Performed by: STUDENT IN AN ORGANIZED HEALTH CARE EDUCATION/TRAINING PROGRAM

## 2024-09-04 PROCEDURE — 82607 VITAMIN B-12: CPT | Performed by: STUDENT IN AN ORGANIZED HEALTH CARE EDUCATION/TRAINING PROGRAM

## 2024-09-04 PROCEDURE — 3075F SYST BP GE 130 - 139MM HG: CPT | Mod: CPTII,S$GLB,, | Performed by: STUDENT IN AN ORGANIZED HEALTH CARE EDUCATION/TRAINING PROGRAM

## 2024-09-04 PROCEDURE — 85045 AUTOMATED RETICULOCYTE COUNT: CPT | Performed by: STUDENT IN AN ORGANIZED HEALTH CARE EDUCATION/TRAINING PROGRAM

## 2024-09-04 PROCEDURE — G2211 COMPLEX E/M VISIT ADD ON: HCPCS | Mod: S$GLB,,, | Performed by: STUDENT IN AN ORGANIZED HEALTH CARE EDUCATION/TRAINING PROGRAM

## 2024-09-04 PROCEDURE — 83615 LACTATE (LD) (LDH) ENZYME: CPT | Performed by: STUDENT IN AN ORGANIZED HEALTH CARE EDUCATION/TRAINING PROGRAM

## 2024-09-04 PROCEDURE — 3288F FALL RISK ASSESSMENT DOCD: CPT | Mod: CPTII,S$GLB,, | Performed by: STUDENT IN AN ORGANIZED HEALTH CARE EDUCATION/TRAINING PROGRAM

## 2024-09-04 PROCEDURE — 4010F ACE/ARB THERAPY RXD/TAKEN: CPT | Mod: CPTII,S$GLB,, | Performed by: STUDENT IN AN ORGANIZED HEALTH CARE EDUCATION/TRAINING PROGRAM

## 2024-09-04 PROCEDURE — 99214 OFFICE O/P EST MOD 30 MIN: CPT | Mod: S$GLB,,, | Performed by: STUDENT IN AN ORGANIZED HEALTH CARE EDUCATION/TRAINING PROGRAM

## 2024-09-04 PROCEDURE — 3008F BODY MASS INDEX DOCD: CPT | Mod: CPTII,S$GLB,, | Performed by: STUDENT IN AN ORGANIZED HEALTH CARE EDUCATION/TRAINING PROGRAM

## 2024-09-04 PROCEDURE — 82746 ASSAY OF FOLIC ACID SERUM: CPT | Performed by: STUDENT IN AN ORGANIZED HEALTH CARE EDUCATION/TRAINING PROGRAM

## 2024-09-04 PROCEDURE — 82728 ASSAY OF FERRITIN: CPT | Performed by: STUDENT IN AN ORGANIZED HEALTH CARE EDUCATION/TRAINING PROGRAM

## 2024-09-04 PROCEDURE — 1159F MED LIST DOCD IN RCRD: CPT | Mod: CPTII,S$GLB,, | Performed by: STUDENT IN AN ORGANIZED HEALTH CARE EDUCATION/TRAINING PROGRAM

## 2024-09-04 PROCEDURE — 1101F PT FALLS ASSESS-DOCD LE1/YR: CPT | Mod: CPTII,S$GLB,, | Performed by: STUDENT IN AN ORGANIZED HEALTH CARE EDUCATION/TRAINING PROGRAM

## 2024-09-04 PROCEDURE — 83540 ASSAY OF IRON: CPT | Performed by: STUDENT IN AN ORGANIZED HEALTH CARE EDUCATION/TRAINING PROGRAM

## 2024-09-04 PROCEDURE — 99999 PR PBB SHADOW E&M-EST. PATIENT-LVL III: CPT | Mod: PBBFAC,,, | Performed by: STUDENT IN AN ORGANIZED HEALTH CARE EDUCATION/TRAINING PROGRAM

## 2024-09-04 PROCEDURE — 3078F DIAST BP <80 MM HG: CPT | Mod: CPTII,S$GLB,, | Performed by: STUDENT IN AN ORGANIZED HEALTH CARE EDUCATION/TRAINING PROGRAM

## 2024-09-04 PROCEDURE — 3044F HG A1C LEVEL LT 7.0%: CPT | Mod: CPTII,S$GLB,, | Performed by: STUDENT IN AN ORGANIZED HEALTH CARE EDUCATION/TRAINING PROGRAM

## 2024-09-04 PROCEDURE — 1126F AMNT PAIN NOTED NONE PRSNT: CPT | Mod: CPTII,S$GLB,, | Performed by: STUDENT IN AN ORGANIZED HEALTH CARE EDUCATION/TRAINING PROGRAM

## 2024-09-04 RX ORDER — KETOCONAZOLE 20 MG/ML
SHAMPOO, SUSPENSION TOPICAL
Qty: 120 ML | Refills: 0 | Status: SHIPPED | OUTPATIENT
Start: 2024-09-05

## 2024-09-04 RX ORDER — TRIAMCINOLONE ACETONIDE 5 MG/G
CREAM TOPICAL 2 TIMES DAILY
Qty: 15 G | Refills: 1 | Status: SHIPPED | OUTPATIENT
Start: 2024-09-04

## 2024-09-04 NOTE — PROGRESS NOTES
[facility]    Subjective:       Patient ID: Richie Johnson is a 69 y.o. male.    Chief Complaint: Annual Exam and Rash (Neck - going on for a long time, recently gotten worse.)      History was obtained from the patient and supplemented through chart review.  This patient is known to me. Prior was Dr. Barrett ([facility]).     HPI:    Patient is a 69 y.o. male w/ pmhx HTN, CAD s/p CABG, prediabetes who presents for annual.    HTN  Well-controlled  Cont ramipril 10    New mild worsening anemia  Check additional studies today  C-scope uptodate  No fam hx of cancer    Skin irritation  Multiple excoriations, past concern about pearly lesion on bilat arm pt attributes to puppy  Long standing skin lesion on left arm, pruritus on posterior neck as well  Canceled appt with [facility] earlier this year prior due to improvement with triamcinalone, try again  Saw Dr. Davis in the past (outside [facility]), pt plans to return to her and will make his own appt    CAD s/p CABG  Dr. Hawk   No MI, taking ASA daily,    HLD  crestor 40 + coenzyme Q10  controlled    S/p rotator cuff surgery, knee pain    Following with ortho    Hx of heavy alcohol intake , cutback still wine some days of week    Discussed R/B checking PSA    Labs reviewed    Wt Readings from Last 15 Encounters:   09/04/24 98.4 kg (216 lb 14.9 oz)   05/30/24 101.8 kg (224 lb 6.9 oz)   02/05/24 102.8 kg (226 lb 10.1 oz)   12/07/23 102.8 kg (226 lb 10.1 oz)   11/30/23 101.8 kg (224 lb 5.1 oz)   11/27/23 103.7 kg (228 lb 9.9 oz)   09/14/23 97.1 kg (214 lb)   07/13/23 97.3 kg (214 lb 8.1 oz)   06/21/23 96.1 kg (211 lb 13.8 oz)   06/07/23 97 kg (213 lb 13.5 oz)   01/12/23 95.8 kg (211 lb 5 oz)   11/09/22 94 kg (207 lb 5.5 oz)   07/07/22 96.5 kg (212 lb 11.9 oz)   03/24/22 100.9 kg (222 lb 7.1 oz)   01/13/22 102 kg (224 lb 13.9 oz)        Medical History  Past Medical History:   Diagnosis Date    Back ache 4/2/2013    Back surgery  10/2012    Coronary atherosclerosis of native coronary artery 4/2/2013 2/28/2013. 3 V CAD. CABG 3/4/2013: BRUNSON LAD, SVG1 D1, SVG2 RI & OM1, SVG3 RCA    Hx of CABG 4/2/2013    Hypercholesterolemia 4/2/2013    Left bundle branch block 8/30/2016 8/30/2016: Diagnosed.    Overweight 8/22/2018 8/22/2018: Weight 96 kg. BMI 28.       Review of Systems   Constitutional:  Negative for appetite change and diaphoresis.   HENT:  Negative for trouble swallowing.    Respiratory:  Negative for shortness of breath.    Cardiovascular:  Negative for chest pain.   Gastrointestinal:  Negative for diarrhea, nausea and vomiting.   Musculoskeletal:  Negative for gait problem.   Skin:  Positive for color change.   Neurological:  Negative for dizziness and seizures.   Psychiatric/Behavioral:  Negative for hallucinations.          Surgical hx, family hx, social hx   Have been reviewed      Current Outpatient Medications:     aspirin (ECOTRIN) 81 MG EC tablet, Take 1 tablet (81 mg total) by mouth once daily., Disp: 90 tablet, Rfl: 3    CALCIUM CARBONATE/VITAMIN D3 (VITAMIN D-3 ORAL), Take 2,000 Int'l Units by mouth once daily., Disp: , Rfl:     ezetimibe (ZETIA) 10 mg tablet, Take 1 tablet (10 mg total) by mouth once daily., Disp: 90 tablet, Rfl: 3    multivitamin (THERAGRAN) per tablet, Take 1 tablet by mouth once daily., Disp: , Rfl:     naproxen sodium (ANAPROX) 220 MG tablet, Take 220 mg by mouth 2 (two) times daily with meals., Disp: , Rfl:     ramipriL (ALTACE) 10 MG capsule, Take 1 capsule (10 mg total) by mouth once daily., Disp: 90 capsule, Rfl: 3    rosuvastatin (CRESTOR) 40 MG Tab, Take 1 tablet (40 mg total) by mouth once daily., Disp: 90 tablet, Rfl: 3    ubidecarenone (COQ-10 ORAL), , Disp: , Rfl:     [START ON 9/5/2024] ketoconazole (NIZORAL) 2 % shampoo, Apply topically twice a week., Disp: 120 mL, Rfl: 0    triamcinolone acetonide 0.5% (KENALOG) 0.5 % Crea, Apply topically 2 (two) times daily., Disp: 15 g, Rfl: 1  No  "current facility-administered medications for this visit.    Facility-Administered Medications Ordered in Other Visits:     0.9%  NaCl infusion, , Intravenous, Continuous, Saul Gonzalez MD    Objective:        Body mass index is 28.62 kg/m².  Vitals:    09/04/24 0755   BP: 132/68   Pulse: 60   SpO2: 98%   Weight: 98.4 kg (216 lb 14.9 oz)   Height: 6' 1" (1.854 m)   PainSc: 0-No pain     Physical Exam  Vitals and nursing note reviewed.   Constitutional:       General: He is not in acute distress.     Appearance: Normal appearance. He is not ill-appearing.   HENT:      Head: Normocephalic and atraumatic.   Eyes:      General: No scleral icterus.  Neck:      Comments: Posterior neck erythema, few papules with excoriations, no flaking seen  Cardiovascular:      Rate and Rhythm: Normal rate and regular rhythm.      Heart sounds: Normal heart sounds.   Pulmonary:      Effort: Pulmonary effort is normal.   Musculoskeletal:         General: No deformity.   Skin:     Comments: Pink pearly lesions with excoriations crusted left forearm confounding by multiple excoriations across arms   Neurological:      Mental Status: He is alert and oriented to person, place, and time.   Psychiatric:         Behavior: Behavior normal.           Lab Results   Component Value Date    WBC 4.01 08/28/2024    HGB 13.0 (L) 08/28/2024    HCT 38.7 (L) 08/28/2024     08/28/2024    CHOL 132 08/28/2024    TRIG 102 08/28/2024    HDL 42 08/28/2024    ALT 41 08/28/2024    AST 39 08/28/2024     08/28/2024    K 3.7 08/28/2024     08/28/2024    CREATININE 0.8 08/28/2024    BUN 23 08/28/2024    CO2 24 08/28/2024    TSH 1.103 08/28/2024    PSA 2.8 08/28/2024    INR 1.0 02/05/2024    HGBA1C 5.8 (H) 08/28/2024       The 10-year ASCVD risk score (Bhupinder RIVERA, et al., 2019) is: 17.9%    Values used to calculate the score:      Age: 69 years      Sex: Male      Is Non- : No      Diabetic: No      Tobacco smoker: No    "   Systolic Blood Pressure: 132 mmHg      Is BP treated: Yes      HDL Cholesterol: 42 mg/dL      Total Cholesterol: 132 mg/dL    On statin 40    (Imaging have been independently reviewed)    No AAA    Assessment:         1. Annual physical exam    2. History of chest pain    3. History of coronary artery bypass surgery    4. Hypercholesterolemia    5. Primary hypertension    6. Coronary artery disease involving native coronary artery of native heart without angina pectoris    7. Prediabetes    8. Anemia, unspecified type    9. Unspecified disturbances of skin sensation    10. Allergic dermatitis    11. Seborrheic dermatitis of scalp              Plan:     Richie was seen today for annual exam and rash.    Diagnoses and all orders for this visit:    Annual physical exam    History of chest pain    History of coronary artery bypass surgery    Hypercholesterolemia    Primary hypertension    Coronary artery disease involving native coronary artery of native heart without angina pectoris    Prediabetes    Anemia, unspecified type  -     Ferritin; Future  -     Iron and TIBC; Future  -     Vitamin B12; Future  -     Folate; Future  -     RETICULOCYTES; Future  -     LACTATE DEHYDROGENASE; Future    Unspecified disturbances of skin sensation  -     Vitamin B12; Future  -     Folate; Future    Allergic dermatitis  -     triamcinolone acetonide 0.5% (KENALOG) 0.5 % Crea; Apply topically 2 (two) times daily.    Seborrheic dermatitis of scalp  -     ketoconazole (NIZORAL) 2 % shampoo; Apply topically twice a week.            Health Maintenance  - Lipids:   - A1C:   - Colon Ca Screen: 2021 Dr. Valente 2021, due in 7 years, plans for just stool sample at that point  - Immunizations: shingles vaccinated; covid vaccine and booster   - PSA       Follow up in about 1 year (around 9/4/2025). or sooner prn        33min were used in chart review, evaluation and counseling of patient, documentation and review of results on same day of  service     Visit today is associated with current or anticipated ongoing medical care related to this patient's single serious condition/complex condition of worsening unknown etiology anemia. The patient will return to see me as these issues will be followed longitudinally.      All medications were reviewed including potential side effects and risks/benefits.  Pt was counseled to call back if anything worsens or if questions arise.    Minh Means MD  Family Medicine  [facility]  [address] [address]  Phone [phone]  Fax [phone]

## 2024-09-05 ENCOUNTER — TELEPHONE (OUTPATIENT)
Dept: INTERNAL MEDICINE | Facility: CLINIC | Age: 69
End: 2024-09-05
Payer: MEDICARE

## 2024-09-05 NOTE — TELEPHONE ENCOUNTER
----- Message from Stephani Bazan sent at 9/5/2024  1:00 PM CDT -----  Name of Who is calling :  SELIN MIRANDA [[id number]]      What is the request in detailPt would like a call back . Please assist         Can the clinic reply by [facility]            What number to call back if not in [facility]: [phone]

## 2024-09-05 NOTE — TELEPHONE ENCOUNTER
----- Message from Stephani Bazan sent at 9/5/2024  1:00 PM CDT -----  Name of Who is calling :  SELIN MIRANDA [[id number]]      What is the request in detailPt would like a call back . Please assist         Can the clinic reply [facility]            What number to call back if not in [facility]: [phone]

## 2024-09-16 ENCOUNTER — OFFICE VISIT (OUTPATIENT)
Dept: URGENT CARE | Facility: CLINIC | Age: 69
End: 2024-09-16
Payer: MEDICARE

## 2024-09-16 VITALS
BODY MASS INDEX: 28.5 KG/M2 | DIASTOLIC BLOOD PRESSURE: 72 MMHG | RESPIRATION RATE: 17 BRPM | HEART RATE: 96 BPM | WEIGHT: 216 LBS | SYSTOLIC BLOOD PRESSURE: 148 MMHG | TEMPERATURE: 99 F | OXYGEN SATURATION: 98 %

## 2024-09-16 DIAGNOSIS — L03.012 CELLULITIS OF LEFT THUMB: Primary | ICD-10-CM

## 2024-09-16 DIAGNOSIS — L03.012 PARONYCHIA OF LEFT THUMB: ICD-10-CM

## 2024-09-16 PROCEDURE — 99213 OFFICE O/P EST LOW 20 MIN: CPT | Mod: 25,S$GLB,, | Performed by: NURSE PRACTITIONER

## 2024-09-16 PROCEDURE — 26010 DRAINAGE OF FINGER ABSCESS: CPT | Mod: S$GLB,,, | Performed by: NURSE PRACTITIONER

## 2024-09-16 RX ORDER — MUPIROCIN 20 MG/G
OINTMENT TOPICAL 3 TIMES DAILY
Qty: 30 G | Refills: 0 | Status: SHIPPED | OUTPATIENT
Start: 2024-09-16 | End: 2024-09-26

## 2024-09-16 RX ORDER — CEPHALEXIN 500 MG/1
500 CAPSULE ORAL 4 TIMES DAILY
Qty: 40 CAPSULE | Refills: 0 | Status: SHIPPED | OUTPATIENT
Start: 2024-09-16 | End: 2024-09-26

## 2024-09-16 NOTE — PROCEDURES
Incision & Drainage    Date/Time: 9/16/2024 9:15 AM    Performed by: Nelida Fiore FNP-C  Authorized by: Nelida Fiore FNP-C    Consent Done?:  Yes (Verbal)    Type:  Abscess  Body area:  Upper extremity  Location details:  Left thumb  Scalpel size:  10  Incision type:  Single straight  Incision depth: dermal    Complexity:  Simple  Drainage:  Serosanguinous  Drainage amount:  Scant  Wound treatment:  Incision, drainage and expression of material  Patient tolerance:  Patient tolerated the procedure well with no immediate complications

## 2024-09-16 NOTE — PATIENT INSTRUCTIONS
Cellulitis of left thumb    -     cephALEXin (KEFLEX) 500 MG capsule; Take 1 capsule (500 mg total) by mouth 4 (four) times daily. for 10 days  Dispense: 40 capsule; Refill: 0    -     mupirocin (BACTROBAN) 2 % ointment; Apply topically 3 (three) times daily. for 10 days  Dispense: 30 g; Refill: 0    Paronychia of left thumb  Nail Bed Infection    Warm soaks 3-5 times a day    Avoid picking or manipulating the wound.     Clean the wound twice a day and put the antibiotic ointment on it.     If you were prescribed antibiotics, please take them to completion       - Fever/Pain recommendations:  Alternate Tylenol or Ibuprofen as directed for fever/pain.     Take ibuprofen/Motrin/Advil every 6-8 hours for pain and inflammation.  Do not take ibuprofen if you have a history of GI bleeding, kidney disease, or if you take blood thinners.    You can also take acetaminophen/Tylenol every 6-8 hours for added pain relief. Avoid tylenol if you have a history of liver disease.         You should seek ER treatment if fever, increase pain, swelling, red streaks from affected area or other signs of increasing infection.

## 2024-09-16 NOTE — PROGRESS NOTES
Subjective:      Patient ID: Richie Johnson is a 69 y.o. male.    Vitals:  weight is 98 kg (216 lb). His oral temperature is 98.7 °F (37.1 °C). His blood pressure is 148/72 (abnormal) and his pulse is 96. His respiration is 17 and oxygen saturation is 98%.     Chief Complaint: Hand Pain      Pt is a 70 yo male presenting with left thumb infection.  Onset of symptoms was 1 week ago.  Pt reports it started out with dry skin that was cracking, then his puppy jumped on him and scratched it which caused it to start becoming infected.     Hand Pain   His dominant hand is their left hand. Incident onset: 1 week. The incident occurred at home. The pain is present in the left fingers. The patient is experiencing no pain. Pertinent negatives include no chest pain.       Constitution: Negative for chills, fatigue and fever.   Cardiovascular:  Negative for chest pain.   Respiratory:  Negative for chest tightness and shortness of breath.    Musculoskeletal:  Positive for pain (left thumb).   Neurological:  Negative for headaches.      Objective:     Physical Exam   Constitutional: He is oriented to person, place, and time.   Eyes: EOM are normal.   Cardiovascular: Normal rate and regular rhythm.   Pulmonary/Chest: Effort normal and breath sounds normal. No respiratory distress.   Musculoskeletal:      Left hand: Left thumb: Exhibits swelling and tenderness.   Neurological: He is alert and oriented to person, place, and time.   Skin: Skin is warm and dry.   Nursing note and vitals reviewed.      Assessment:     1. Cellulitis of left thumb    2. Paronychia of left thumb        Plan:       Cellulitis of left thumb  -     cephALEXin (KEFLEX) 500 MG capsule; Take 1 capsule (500 mg total) by mouth 4 (four) times daily. for 10 days  Dispense: 40 capsule; Refill: 0  -     mupirocin (BACTROBAN) 2 % ointment; Apply topically 3 (three) times daily. for 10 days  Dispense: 30 g; Refill: 0    Paronychia of left thumb  -     Incision &  Drainage      Patient Instructions     Cellulitis of left thumb    -     cephALEXin (KEFLEX) 500 MG capsule; Take 1 capsule (500 mg total) by mouth 4 (four) times daily. for 10 days  Dispense: 40 capsule; Refill: 0    -     mupirocin (BACTROBAN) 2 % ointment; Apply topically 3 (three) times daily. for 10 days  Dispense: 30 g; Refill: 0    Paronychia of left thumb  Nail Bed Infection    Warm soaks 3-5 times a day    Avoid picking or manipulating the wound.     Clean the wound twice a day and put the antibiotic ointment on it.     If you were prescribed antibiotics, please take them to completion       - Fever/Pain recommendations:  Alternate Tylenol or Ibuprofen as directed for fever/pain.     Take ibuprofen/Motrin/Advil every 6-8 hours for pain and inflammation.  Do not take ibuprofen if you have a history of GI bleeding, kidney disease, or if you take blood thinners.    You can also take acetaminophen/Tylenol every 6-8 hours for added pain relief. Avoid tylenol if you have a history of liver disease.         You should seek ER treatment if fever, increase pain, swelling, red streaks from affected area or other signs of increasing infection.

## 2024-11-19 ENCOUNTER — PATIENT MESSAGE (OUTPATIENT)
Dept: INTERNAL MEDICINE | Facility: CLINIC | Age: 69
End: 2024-11-19
Payer: MEDICARE

## 2024-11-19 ENCOUNTER — PATIENT MESSAGE (OUTPATIENT)
Dept: CARDIOLOGY | Facility: CLINIC | Age: 69
End: 2024-11-19
Payer: MEDICARE

## 2024-11-19 VITALS — DIASTOLIC BLOOD PRESSURE: 60 MMHG | SYSTOLIC BLOOD PRESSURE: 114 MMHG

## 2024-11-20 ENCOUNTER — OFFICE VISIT (OUTPATIENT)
Dept: CARDIOLOGY | Facility: CLINIC | Age: 69
End: 2024-11-20
Attending: INTERNAL MEDICINE
Payer: MEDICARE

## 2024-11-20 VITALS
WEIGHT: 217.69 LBS | OXYGEN SATURATION: 99 % | SYSTOLIC BLOOD PRESSURE: 128 MMHG | BODY MASS INDEX: 28.85 KG/M2 | DIASTOLIC BLOOD PRESSURE: 76 MMHG | HEIGHT: 73 IN | HEART RATE: 55 BPM

## 2024-11-20 DIAGNOSIS — M54.50 CHRONIC MIDLINE LOW BACK PAIN WITHOUT SCIATICA: ICD-10-CM

## 2024-11-20 DIAGNOSIS — Z95.1 HISTORY OF CORONARY ARTERY BYPASS SURGERY: ICD-10-CM

## 2024-11-20 DIAGNOSIS — E78.00 HYPERCHOLESTEROLEMIA: ICD-10-CM

## 2024-11-20 DIAGNOSIS — Z87.898 HISTORY OF CHEST PAIN: ICD-10-CM

## 2024-11-20 DIAGNOSIS — I10 PRIMARY HYPERTENSION: ICD-10-CM

## 2024-11-20 DIAGNOSIS — I45.10 RIGHT BUNDLE BRANCH BLOCK: ICD-10-CM

## 2024-11-20 DIAGNOSIS — E66.3 OVERWEIGHT: ICD-10-CM

## 2024-11-20 DIAGNOSIS — G89.29 CHRONIC MIDLINE LOW BACK PAIN WITHOUT SCIATICA: ICD-10-CM

## 2024-11-20 DIAGNOSIS — I25.10 CORONARY ARTERY DISEASE INVOLVING NATIVE CORONARY ARTERY OF NATIVE HEART WITHOUT ANGINA PECTORIS: ICD-10-CM

## 2024-11-20 PROCEDURE — 4010F ACE/ARB THERAPY RXD/TAKEN: CPT | Mod: CPTII,S$GLB,, | Performed by: INTERNAL MEDICINE

## 2024-11-20 PROCEDURE — 93005 ELECTROCARDIOGRAM TRACING: CPT

## 2024-11-20 PROCEDURE — 3288F FALL RISK ASSESSMENT DOCD: CPT | Mod: CPTII,S$GLB,, | Performed by: INTERNAL MEDICINE

## 2024-11-20 PROCEDURE — 3008F BODY MASS INDEX DOCD: CPT | Mod: CPTII,S$GLB,, | Performed by: INTERNAL MEDICINE

## 2024-11-20 PROCEDURE — 1126F AMNT PAIN NOTED NONE PRSNT: CPT | Mod: CPTII,S$GLB,, | Performed by: INTERNAL MEDICINE

## 2024-11-20 PROCEDURE — 3074F SYST BP LT 130 MM HG: CPT | Mod: CPTII,S$GLB,, | Performed by: INTERNAL MEDICINE

## 2024-11-20 PROCEDURE — 3044F HG A1C LEVEL LT 7.0%: CPT | Mod: CPTII,S$GLB,, | Performed by: INTERNAL MEDICINE

## 2024-11-20 PROCEDURE — 3078F DIAST BP <80 MM HG: CPT | Mod: CPTII,S$GLB,, | Performed by: INTERNAL MEDICINE

## 2024-11-20 PROCEDURE — 93010 ELECTROCARDIOGRAM REPORT: CPT | Mod: S$GLB,,, | Performed by: INTERNAL MEDICINE

## 2024-11-20 PROCEDURE — 1159F MED LIST DOCD IN RCRD: CPT | Mod: CPTII,S$GLB,, | Performed by: INTERNAL MEDICINE

## 2024-11-20 PROCEDURE — 99999 PR PBB SHADOW E&M-EST. PATIENT-LVL III: CPT | Mod: PBBFAC,,, | Performed by: INTERNAL MEDICINE

## 2024-11-20 PROCEDURE — 99214 OFFICE O/P EST MOD 30 MIN: CPT | Mod: 25,S$GLB,, | Performed by: INTERNAL MEDICINE

## 2024-11-20 PROCEDURE — 1101F PT FALLS ASSESS-DOCD LE1/YR: CPT | Mod: CPTII,S$GLB,, | Performed by: INTERNAL MEDICINE

## 2024-11-20 RX ORDER — RAMIPRIL 10 MG/1
10 CAPSULE ORAL DAILY
Qty: 90 CAPSULE | Refills: 3 | Status: SHIPPED | OUTPATIENT
Start: 2024-11-20

## 2024-11-20 RX ORDER — ROSUVASTATIN CALCIUM 40 MG/1
40 TABLET, COATED ORAL DAILY
Qty: 90 TABLET | Refills: 3 | Status: SHIPPED | OUTPATIENT
Start: 2024-11-20

## 2024-11-20 RX ORDER — EZETIMIBE 10 MG/1
10 TABLET ORAL DAILY
Qty: 90 TABLET | Refills: 3 | Status: SHIPPED | OUTPATIENT
Start: 2024-11-20

## 2024-11-20 RX ORDER — ASPIRIN 81 MG/1
81 TABLET ORAL DAILY
COMMUNITY
Start: 2024-11-20

## 2024-11-20 NOTE — PROGRESS NOTES
"Subjective:     Richie Johnson is a 69 y.o.male with hypertension and hypercholesterolemia. He is overweight. He has severe coronary artery disease and underwent coronary artery bypass grafting on 3/4/2013 receiving five grafts. He had some chest discomfort in 2016 and then had epigastric pain 6/2018. In 11/2019 he began experience epigastric discomfort and left arm pain. No clear exertional chest pain and no clear association with playing tennis. On 2/7/2020 he underwent a stress echocardiogram and was able 10:00 minutes on the treadmill. There was no chest pain. The electrocardiogram was "negative" with a right bundle branch block. The echocardiogram was "negative" as well with normal left ventricular size and systolic function. He may have had myalgia on statin in the past but appears to tolerate rosuvastatin well. In 2021 he had some neck pain. No exertional chest pain or exertional dyspnea. No palpitations or weak spells. No issues with any of his prescribed medications. Feeling well overall.      Coronary Artery Disease  Presents for follow-up visit. Pertinent negatives include no chest pain, chest pressure, chest tightness, dizziness, leg swelling, muscle weakness, palpitations, shortness of breath or weight gain. Risk factors include hyperlipidemia. Risk factors do not include hypertension or obesity. The symptoms have been stable.   Chest Pain   This is a chronic problem. The current episode started more than 1 month ago. The problem has been resolved. Associated symptoms include back pain. Pertinent negatives include no abdominal pain, claudication, cough, diaphoresis, dizziness, exertional chest pressure, fever, headaches, hemoptysis, irregular heartbeat, leg pain, lower extremity edema, malaise/fatigue, nausea, near-syncope, numbness, orthopnea, palpitations, PND, shortness of breath, sputum production, syncope, vomiting or weakness.   His past medical history is significant for CAD and hyperlipidemia. "   Pertinent negatives for past medical history include no diabetes, no hypertension and no muscle weakness.   Hypertension  The problem is unchanged. The problem is controlled (usually 120-130/60-75 mmHg at home). Associated symptoms include neck pain. Pertinent negatives include no anxiety, blurred vision, chest pain, headaches, malaise/fatigue, orthopnea, palpitations, PND, shortness of breath or sweats. There is no history of chronic renal disease.   Hyperlipidemia  This is a chronic problem. The current episode started more than 1 year ago. The problem is controlled. Recent lipid tests were reviewed and are normal. He has no history of chronic renal disease, diabetes, hypothyroidism, liver disease, obesity or nephrotic syndrome. Pertinent negatives include no chest pain, focal sensory loss, focal weakness, leg pain, myalgias or shortness of breath.       Review of Systems   Constitutional: Negative for diaphoresis, fever, malaise/fatigue and weight gain.   HENT:  Negative for nosebleeds.    Eyes:  Negative for blurred vision, double vision, pain, vision loss in left eye and vision loss in right eye.   Cardiovascular:  Negative for chest pain, claudication, dyspnea on exertion, irregular heartbeat, leg swelling, near-syncope, orthopnea, palpitations, paroxysmal nocturnal dyspnea and syncope.   Respiratory:  Negative for chest tightness, cough, hemoptysis, shortness of breath, sputum production and wheezing.    Endocrine: Negative for cold intolerance and heat intolerance.   Hematologic/Lymphatic: Negative for bleeding problem. Does not bruise/bleed easily.   Skin:  Negative for color change and itching.   Musculoskeletal:  Positive for back pain and neck pain. Negative for falls, muscle weakness and myalgias.   Gastrointestinal:  Negative for abdominal pain, flatus, heartburn, hematemesis, hematochezia, hemorrhoids, jaundice, melena, nausea and vomiting.   Genitourinary:  Negative for dysuria and hematuria.  "  Neurological:  Negative for dizziness, focal weakness, headaches, light-headedness, loss of balance, numbness, sensory change, tremors and weakness.   Psychiatric/Behavioral:  Negative for altered mental status, depression and memory loss. The patient is not nervous/anxious.    Allergic/Immunologic: Negative for hives and persistent infections.       Current Outpatient Medications on File Prior to Visit   Medication Sig Dispense Refill    aspirin (ECOTRIN) 81 MG EC tablet Take 1 tablet (81 mg total) by mouth once daily. 90 tablet 3    CALCIUM CARBONATE/VITAMIN D3 (VITAMIN D-3 ORAL) Take 2,000 Int'l Units by mouth once daily.      ezetimibe (ZETIA) 10 mg tablet Take 1 tablet (10 mg total) by mouth once daily. 90 tablet 3    ketoconazole (NIZORAL) 2 % shampoo Apply topically twice a week. 120 mL 0    multivitamin (THERAGRAN) per tablet Take 1 tablet by mouth once daily.      naproxen sodium (ANAPROX) 220 MG tablet Take 220 mg by mouth 2 (two) times daily with meals.      ramipriL (ALTACE) 10 MG capsule Take 1 capsule (10 mg total) by mouth once daily. 90 capsule 3    rosuvastatin (CRESTOR) 40 MG Tab Take 1 tablet (40 mg total) by mouth once daily. 90 tablet 3    triamcinolone acetonide 0.5% (KENALOG) 0.5 % Crea Apply topically 2 (two) times daily. (Patient taking differently: Apply topically 2 (two) times daily. occasionally) 15 g 1    ubidecarenone (COQ-10 ORAL)        Current Facility-Administered Medications on File Prior to Visit   Medication Dose Route Frequency Provider Last Rate Last Admin    0.9%  NaCl infusion   Intravenous Continuous Saul Gonzalez MD           /76   Pulse (!) 55   Ht 6' 1" (1.854 m)   Wt 98.8 kg (217 lb 11.3 oz)   SpO2 99%   BMI 28.72 kg/m²     Objective:     Physical Exam  Constitutional:       General: He is not in acute distress.     Appearance: Normal appearance. He is well-developed. He is not diaphoretic.   HENT:      Head: Normocephalic and atraumatic.   Eyes:      " General: No scleral icterus.        Right eye: No discharge.         Left eye: No discharge.      Conjunctiva/sclera: Conjunctivae normal.   Neck:      Thyroid: No thyromegaly.      Vascular: No carotid bruit or JVD.   Cardiovascular:      Rate and Rhythm: Normal rate and regular rhythm.      Pulses:           Radial pulses are 2+ on the right side and 2+ on the left side.        Dorsalis pedis pulses are 2+ on the right side and 2+ on the left side.        Posterior tibial pulses are 2+ on the right side and 2+ on the left side.      Heart sounds: S1 normal and S2 normal. No murmur heard.     Gallop present. S4 sounds present. No S3 sounds.   Pulmonary:      Effort: Pulmonary effort is normal.      Breath sounds: Normal breath sounds.   Abdominal:      Palpations: Abdomen is soft.      Tenderness: There is no abdominal tenderness.   Musculoskeletal:      Cervical back: Neck supple.      Right ankle: No swelling, deformity or ecchymosis.      Left ankle: No swelling, deformity or ecchymosis.   Lymphadenopathy:      Head:      Right side of head: No submandibular adenopathy.      Left side of head: No submandibular adenopathy.      Cervical: No cervical adenopathy.   Skin:     General: Skin is warm and dry.      Findings: No rash.   Neurological:      General: No focal deficit present.      Mental Status: He is alert and oriented to person, place, and time.   Psychiatric:         Attention and Perception: Attention and perception normal.         Mood and Affect: Mood and affect normal.         Speech: Speech normal.         Behavior: Behavior normal.         Thought Content: Thought content normal.         Cognition and Memory: Cognition and memory normal.         Judgment: Judgment normal.         Assessment:     1. Coronary artery disease involving native coronary artery of native heart without angina pectoris    2. History of coronary artery bypass surgery    3. History of chest pain    4. Right bundle branch  "block    5. Primary hypertension    6. Hypercholesterolemia    7. Overweight    8. Chronic midline low back pain without sciatica        Plan:      1. Coronary Artery Disease   2/28/2013: [facility]: Cath: 3V CAD.    3/4/2013: [facility]: CABG x 5: LIMA to LAD, SVG1 to D1, SVG2 to RI & OM1 and SVG3 to RCA.   2/7/2020: Stress Echo: 10:00 min. No CP. ECG "negative". RBBB. Echo: "negative". Normal left ventricular size and systolic function.   9/15/2023: Echo: Normal left ventricular size and systolic function. EF 65%. Moderate diastolic dysfunction. Moderately enlarged LA.    On aspirin 81 mg Q24.   Appears stable.     2. History of Chest Pain   8/15/2016: Experienced chest pain.   10/20/2016: Echo: Normal left ventricular size and systolic function. Mild LVH. Moderate diastolic dysfunction. Mildly dilated LA.   10/20/2016: Regadenoson MPI: Small moderately dense fixed inferoapical defect.   10/26/2018: Echo: Normal left ventricular size and systolic function. Mild LVH. Mildly dilated LA.   6/2018: Episode of epigastric pain.   11/2019: Recurrent pain.   Appeared atypical.   Resolved.     3. Right Bundle Branch Block   2/6/2020: NSR. RBBB.   7/13/2023: NSR. RBBB.   9/22/2023: Holter: Sinus rhythm 68 () bpm. Normal IL interval. Widened QRS with RBBB morphology. No pauses. Rare APC's - <1/hr. Rare VPC's - <1/hr. No reported symptoms.     4. Hypertension   8/22/2018: 135/79 mmHg.   4/24/2019: 159/86 mmHg.   6/18/2020: 137/79 mmHg.   9/17/2020: 152/80 mmHg.   9/17/2020: Ramipril 5 mg Q24 was begun.    5/13/2021: Ramipril 5 mg Q24 was increased to ramipril 10 mg Q24.   On ramipril 10 mg Q24.   Keeping log at home.   Well controlled.     5. Hypercholesterolemia   5/16/2013: Chol 209. HDL 43. . .   Was on atorvastatin 20 mg Q24 - unable to tolerate 40 mg Q24.   On rosuvastatin 40 mg Q24 and ezetimide 10 mg Q24.   12/10/2014: Chol 172. HDL 46. LDL 87. .   9/24/2015: Chol 148. HDL 50. LDL 78. TG 99.   9/20/2018: " Chol 124. HDL 48. LDL 65. TG 54.   3/30/2022: Chol 134. HDL 51. LDL 73. TG 50.   6/7/2023: Chol 151. HDL 50. LDL 84. TG 85.   8/28/2024: Chol 132. HDL 42. LDL 70. .   On rosuvastatin 40 mg Q24 and ezetimibe 10 mg Q24.   Very favorable lipid panel.     6. Overweight   8/22/2018: Weight 96 kg. BMI 28.   11/30/2023: Weight 102 kg. BMI 30.   11/20/2024: Weight 99 kg. BMI 29.   Encouraged to lose weight.    7. Back Pain   2012: Back Surgery.    8. Primary Care   Dr. Patricia Barrett.    F/u 6 months.    Tiffanie Hawk M.D.

## 2024-11-21 LAB
OHS QRS DURATION: 136 MS
OHS QTC CALCULATION: 436 MS

## 2024-12-05 ENCOUNTER — LAB VISIT (OUTPATIENT)
Dept: LAB | Facility: OTHER | Age: 69
End: 2024-12-05
Attending: STUDENT IN AN ORGANIZED HEALTH CARE EDUCATION/TRAINING PROGRAM
Payer: MEDICARE

## 2024-12-05 DIAGNOSIS — D64.9 ANEMIA, UNSPECIFIED TYPE: ICD-10-CM

## 2024-12-05 LAB
ALBUMIN SERPL BCP-MCNC: 4.2 G/DL (ref 3.5–5.2)
ALP SERPL-CCNC: 48 U/L (ref 40–150)
ALT SERPL W/O P-5'-P-CCNC: 45 U/L (ref 10–44)
ANION GAP SERPL CALC-SCNC: 11 MMOL/L (ref 8–16)
AST SERPL-CCNC: 48 U/L (ref 10–40)
BILIRUB SERPL-MCNC: 0.7 MG/DL (ref 0.1–1)
BUN SERPL-MCNC: 30 MG/DL (ref 8–23)
CALCIUM SERPL-MCNC: 9.6 MG/DL (ref 8.7–10.5)
CHLORIDE SERPL-SCNC: 103 MMOL/L (ref 95–110)
CO2 SERPL-SCNC: 25 MMOL/L (ref 23–29)
CREAT SERPL-MCNC: 0.8 MG/DL (ref 0.5–1.4)
ERYTHROCYTE [DISTWIDTH] IN BLOOD BY AUTOMATED COUNT: 13 % (ref 11.5–14.5)
EST. GFR  (NO RACE VARIABLE): >60 ML/MIN/1.73 M^2
FERRITIN SERPL-MCNC: 306 NG/ML (ref 20–300)
GLUCOSE SERPL-MCNC: 109 MG/DL (ref 70–110)
HAPTOGLOB SERPL-MCNC: 123 MG/DL (ref 30–250)
HCT VFR BLD AUTO: 42.8 % (ref 40–54)
HGB BLD-MCNC: 13.9 G/DL (ref 14–18)
LDH SERPL L TO P-CCNC: 345 U/L (ref 110–260)
MCH RBC QN AUTO: 29.1 PG (ref 27–31)
MCHC RBC AUTO-ENTMCNC: 32.5 G/DL (ref 32–36)
MCV RBC AUTO: 90 FL (ref 82–98)
PLATELET # BLD AUTO: 186 K/UL (ref 150–450)
PMV BLD AUTO: 10.3 FL (ref 9.2–12.9)
POTASSIUM SERPL-SCNC: 4.1 MMOL/L (ref 3.5–5.1)
PROT SERPL-MCNC: 6.6 G/DL (ref 6–8.4)
RBC # BLD AUTO: 4.78 M/UL (ref 4.6–6.2)
SODIUM SERPL-SCNC: 139 MMOL/L (ref 136–145)
WBC # BLD AUTO: 5.33 K/UL (ref 3.9–12.7)

## 2024-12-05 PROCEDURE — 80053 COMPREHEN METABOLIC PANEL: CPT | Performed by: STUDENT IN AN ORGANIZED HEALTH CARE EDUCATION/TRAINING PROGRAM

## 2024-12-05 PROCEDURE — 84165 PROTEIN E-PHORESIS SERUM: CPT | Mod: 26,,, | Performed by: PATHOLOGY

## 2024-12-05 PROCEDURE — 82728 ASSAY OF FERRITIN: CPT | Performed by: STUDENT IN AN ORGANIZED HEALTH CARE EDUCATION/TRAINING PROGRAM

## 2024-12-05 PROCEDURE — 86334 IMMUNOFIX E-PHORESIS SERUM: CPT | Performed by: STUDENT IN AN ORGANIZED HEALTH CARE EDUCATION/TRAINING PROGRAM

## 2024-12-05 PROCEDURE — 86334 IMMUNOFIX E-PHORESIS SERUM: CPT | Mod: 26,,, | Performed by: PATHOLOGY

## 2024-12-05 PROCEDURE — 84165 PROTEIN E-PHORESIS SERUM: CPT | Performed by: STUDENT IN AN ORGANIZED HEALTH CARE EDUCATION/TRAINING PROGRAM

## 2024-12-05 PROCEDURE — 83615 LACTATE (LD) (LDH) ENZYME: CPT | Performed by: STUDENT IN AN ORGANIZED HEALTH CARE EDUCATION/TRAINING PROGRAM

## 2024-12-05 PROCEDURE — 83521 IG LIGHT CHAINS FREE EACH: CPT | Performed by: STUDENT IN AN ORGANIZED HEALTH CARE EDUCATION/TRAINING PROGRAM

## 2024-12-05 PROCEDURE — 85027 COMPLETE CBC AUTOMATED: CPT | Performed by: STUDENT IN AN ORGANIZED HEALTH CARE EDUCATION/TRAINING PROGRAM

## 2024-12-05 PROCEDURE — 83010 ASSAY OF HAPTOGLOBIN QUANT: CPT | Performed by: STUDENT IN AN ORGANIZED HEALTH CARE EDUCATION/TRAINING PROGRAM

## 2024-12-05 PROCEDURE — 36415 COLL VENOUS BLD VENIPUNCTURE: CPT | Performed by: STUDENT IN AN ORGANIZED HEALTH CARE EDUCATION/TRAINING PROGRAM

## 2024-12-06 LAB
ALBUMIN SERPL ELPH-MCNC: 4.28 G/DL (ref 3.35–5.55)
ALPHA1 GLOB SERPL ELPH-MCNC: 0.23 G/DL (ref 0.17–0.41)
ALPHA2 GLOB SERPL ELPH-MCNC: 0.55 G/DL (ref 0.43–0.99)
B-GLOBULIN SERPL ELPH-MCNC: 0.69 G/DL (ref 0.5–1.1)
GAMMA GLOB SERPL ELPH-MCNC: 0.55 G/DL (ref 0.67–1.58)
INTERPRETATION SERPL IFE-IMP: NORMAL
KAPPA LC SER QL IA: 1.19 MG/DL (ref 0.33–1.94)
KAPPA LC/LAMBDA SER IA: 1.75 (ref 0.26–1.65)
LAMBDA LC SER QL IA: 0.68 MG/DL (ref 0.57–2.63)
PROT SERPL-MCNC: 6.3 G/DL (ref 6–8.4)

## 2024-12-09 LAB
PATHOLOGIST INTERPRETATION IFE: NORMAL
PATHOLOGIST INTERPRETATION SPE: NORMAL

## 2024-12-31 ENCOUNTER — PATIENT MESSAGE (OUTPATIENT)
Dept: INTERNAL MEDICINE | Facility: CLINIC | Age: 69
End: 2024-12-31
Payer: MEDICARE

## 2024-12-31 ENCOUNTER — E-CONSULT (OUTPATIENT)
Dept: HEMATOLOGY/ONCOLOGY | Facility: CLINIC | Age: 69
End: 2024-12-31
Payer: MEDICARE

## 2024-12-31 DIAGNOSIS — D50.0 ANEMIA DUE TO CHRONIC BLOOD LOSS: Primary | ICD-10-CM

## 2024-12-31 DIAGNOSIS — D64.9 ANEMIA, UNSPECIFIED TYPE: Primary | ICD-10-CM

## 2024-12-31 DIAGNOSIS — D80.1 HYPOGAMMAGLOBULINEMIA: ICD-10-CM

## 2024-12-31 NOTE — CONSULTS
[facility] - Hematology [facility]  Response for E-Consult     Patient Name: Richie Johnson  MRN: [id number]  Primary Care Provider: Minh Means MD   Requesting Provider: Minh Means MD  E-Consult to Hemonc  Consult performed by: Deedee Rojas MD  Consult ordered by: Minh Means MD  Reason for consult: anemia          Recommendation: Labs reviewed. Most recent hgb of  13.9 is very close to universal normal of 14.0. Labs have recently been in the 13 range for over a year. Encourage age appropriate cancer screening is up to date.     Plasma Cell Dyscrasia labs are within acceptable range. An M spike on the SPEP would be something to follow, but the hypogammaglobulins are not indicative of an evolving issue.         Contingency that warrants a repeat eConsult or referral: If hemoglobin is consistently in the 12 or less range.     Total time of Consultation: 10 minute    I did not speak to the requesting provider verbally about this.     *This eConsult is based on the clinical data available to me and is furnished without benefit of a physical examination. The eConsult will need to be interpreted in light of any clinical issues or changes in patient status not available to me at the time of filing this eConsults. Significant changes in patient condition or level of acuity should result in immediate formal consultation and reevaluation. Please alert me if you have further questions.    Thank you for this eConsult referral.     Deedee Rojas MD  [facility] - Hematology [facility]

## 2025-03-24 DIAGNOSIS — Z00.00 ENCOUNTER FOR MEDICARE ANNUAL WELLNESS EXAM: ICD-10-CM

## 2025-03-26 ENCOUNTER — LAB VISIT (OUTPATIENT)
Dept: LAB | Facility: OTHER | Age: 70
End: 2025-03-26
Attending: STUDENT IN AN ORGANIZED HEALTH CARE EDUCATION/TRAINING PROGRAM
Payer: MEDICARE

## 2025-03-26 ENCOUNTER — OFFICE VISIT (OUTPATIENT)
Dept: INTERNAL MEDICINE | Facility: CLINIC | Age: 70
End: 2025-03-26
Payer: MEDICARE

## 2025-03-26 ENCOUNTER — TELEPHONE (OUTPATIENT)
Dept: DERMATOLOGY | Facility: CLINIC | Age: 70
End: 2025-03-26
Payer: MEDICARE

## 2025-03-26 VITALS — HEART RATE: 55 BPM | BODY MASS INDEX: 29.74 KG/M2 | WEIGHT: 224.44 LBS | OXYGEN SATURATION: 96 % | HEIGHT: 73 IN

## 2025-03-26 DIAGNOSIS — L98.9 SKIN LESION: ICD-10-CM

## 2025-03-26 DIAGNOSIS — Z91.89 AT RISK FOR BLEEDING: ICD-10-CM

## 2025-03-26 DIAGNOSIS — I25.10 CORONARY ARTERY DISEASE INVOLVING NATIVE CORONARY ARTERY OF NATIVE HEART WITHOUT ANGINA PECTORIS: ICD-10-CM

## 2025-03-26 DIAGNOSIS — R73.03 PREDIABETES: ICD-10-CM

## 2025-03-26 DIAGNOSIS — I10 PRIMARY HYPERTENSION: ICD-10-CM

## 2025-03-26 DIAGNOSIS — I10 PRIMARY HYPERTENSION: Primary | ICD-10-CM

## 2025-03-26 DIAGNOSIS — H91.90 HEARING LOSS, UNSPECIFIED HEARING LOSS TYPE, UNSPECIFIED LATERALITY: ICD-10-CM

## 2025-03-26 LAB
ALBUMIN SERPL BCP-MCNC: 4.1 G/DL (ref 3.5–5.2)
ALP SERPL-CCNC: 43 UNIT/L (ref 40–150)
ALT SERPL W/O P-5'-P-CCNC: 42 UNIT/L (ref 10–44)
ANION GAP (OHS): 8 MMOL/L (ref 8–16)
AST SERPL-CCNC: 39 UNIT/L (ref 11–45)
BILIRUB SERPL-MCNC: 0.5 MG/DL (ref 0.1–1)
BUN SERPL-MCNC: 24 MG/DL (ref 8–23)
CALCIUM SERPL-MCNC: 9.9 MG/DL (ref 8.7–10.5)
CHLORIDE SERPL-SCNC: 105 MMOL/L (ref 95–110)
CHOLEST SERPL-MCNC: 123 MG/DL (ref 120–199)
CHOLEST/HDLC SERPL: 3.2 {RATIO} (ref 2–5)
CO2 SERPL-SCNC: 25 MMOL/L (ref 23–29)
CREAT SERPL-MCNC: 0.9 MG/DL (ref 0.5–1.4)
EAG (OHS): 126 MG/DL (ref 68–131)
ERYTHROCYTE [DISTWIDTH] IN BLOOD BY AUTOMATED COUNT: 12.8 % (ref 11.5–14.5)
GFR SERPLBLD CREATININE-BSD FMLA CKD-EPI: >60 ML/MIN/1.73/M2
GLUCOSE SERPL-MCNC: 98 MG/DL (ref 70–110)
HBA1C MFR BLD: 6 % (ref 4–5.6)
HCT VFR BLD AUTO: 41.3 % (ref 40–54)
HDLC SERPL-MCNC: 39 MG/DL (ref 40–75)
HDLC SERPL: 31.7 % (ref 20–50)
HGB BLD-MCNC: 14 GM/DL (ref 14–18)
LDLC SERPL CALC-MCNC: 64.8 MG/DL (ref 63–159)
MCH RBC QN AUTO: 29.9 PG (ref 27–50)
MCHC RBC AUTO-ENTMCNC: 33.9 G/DL (ref 32–36)
MCV RBC AUTO: 88 FL (ref 82–98)
NONHDLC SERPL-MCNC: 84 MG/DL
PLATELET # BLD AUTO: 179 K/UL (ref 150–450)
PMV BLD AUTO: 9.9 FL (ref 9.2–12.9)
POTASSIUM SERPL-SCNC: 4.3 MMOL/L (ref 3.5–5.1)
PROT SERPL-MCNC: 6.8 GM/DL (ref 6–8.4)
RBC # BLD AUTO: 4.68 M/UL (ref 4.6–6.2)
SODIUM SERPL-SCNC: 138 MMOL/L (ref 136–145)
TRIGL SERPL-MCNC: 96 MG/DL (ref 30–150)
WBC # BLD AUTO: 5.49 K/UL (ref 3.9–12.7)

## 2025-03-26 PROCEDURE — 83036 HEMOGLOBIN GLYCOSYLATED A1C: CPT

## 2025-03-26 PROCEDURE — 99214 OFFICE O/P EST MOD 30 MIN: CPT | Mod: S$GLB,,, | Performed by: STUDENT IN AN ORGANIZED HEALTH CARE EDUCATION/TRAINING PROGRAM

## 2025-03-26 PROCEDURE — 3288F FALL RISK ASSESSMENT DOCD: CPT | Mod: CPTII,S$GLB,, | Performed by: STUDENT IN AN ORGANIZED HEALTH CARE EDUCATION/TRAINING PROGRAM

## 2025-03-26 PROCEDURE — 1159F MED LIST DOCD IN RCRD: CPT | Mod: CPTII,S$GLB,, | Performed by: STUDENT IN AN ORGANIZED HEALTH CARE EDUCATION/TRAINING PROGRAM

## 2025-03-26 PROCEDURE — 36415 COLL VENOUS BLD VENIPUNCTURE: CPT

## 2025-03-26 PROCEDURE — G2211 COMPLEX E/M VISIT ADD ON: HCPCS | Mod: S$GLB,,, | Performed by: STUDENT IN AN ORGANIZED HEALTH CARE EDUCATION/TRAINING PROGRAM

## 2025-03-26 PROCEDURE — 84478 ASSAY OF TRIGLYCERIDES: CPT

## 2025-03-26 PROCEDURE — 1101F PT FALLS ASSESS-DOCD LE1/YR: CPT | Mod: CPTII,S$GLB,, | Performed by: STUDENT IN AN ORGANIZED HEALTH CARE EDUCATION/TRAINING PROGRAM

## 2025-03-26 PROCEDURE — 99999 PR PBB SHADOW E&M-EST. PATIENT-LVL V: CPT | Mod: PBBFAC,,, | Performed by: STUDENT IN AN ORGANIZED HEALTH CARE EDUCATION/TRAINING PROGRAM

## 2025-03-26 PROCEDURE — 85027 COMPLETE CBC AUTOMATED: CPT

## 2025-03-26 PROCEDURE — 4010F ACE/ARB THERAPY RXD/TAKEN: CPT | Mod: CPTII,S$GLB,, | Performed by: STUDENT IN AN ORGANIZED HEALTH CARE EDUCATION/TRAINING PROGRAM

## 2025-03-26 PROCEDURE — 1125F AMNT PAIN NOTED PAIN PRSNT: CPT | Mod: CPTII,S$GLB,, | Performed by: STUDENT IN AN ORGANIZED HEALTH CARE EDUCATION/TRAINING PROGRAM

## 2025-03-26 PROCEDURE — 84460 ALANINE AMINO (ALT) (SGPT): CPT

## 2025-03-26 PROCEDURE — 3008F BODY MASS INDEX DOCD: CPT | Mod: CPTII,S$GLB,, | Performed by: STUDENT IN AN ORGANIZED HEALTH CARE EDUCATION/TRAINING PROGRAM

## 2025-03-26 NOTE — TELEPHONE ENCOUNTER
----- Message from Med Assistant Rosalinda sent at 3/26/2025  3:03 PM CDT -----  Can someone reach out to patient to schedule an appointment. Thanks

## 2025-03-26 NOTE — PROGRESS NOTES
[facility]    Subjective:       Patient ID: Richie Johnson is a 69 y.o. male.    Chief Complaint: Annual Exam      History was obtained from the patient and supplemented through chart review.  This patient is known to me. Prior was Dr. Barrett ([facility]).     HPI:    Patient is a 69 y.o. male w/ pmhx HTN, CAD s/p CABG, prediabetes who presents for annual.    HTN  Well-controlled  Cont ramipril 10    New mild worsening anemia  Check additional studies today  C-scope uptodate  No fam hx of cancer    Photodamage  Missed appts with derm in past, wishes to try again, referral placed    CAD s/p CABG  Dr. Hawk   No MI, taking ASA daily,    HLD  crestor 40 + coenzyme Q10  controlled    S/p rotator cuff surgery, knee pain    Following with ortho    Hx of heavy alcohol intake , cutback still wine some days of week  Encouraged more decrease    Labs reviewed    Wt Readings from Last 15 Encounters:   03/26/25 101.8 kg (224 lb 6.9 oz)   11/20/24 98.8 kg (217 lb 11.3 oz)   09/16/24 98 kg (216 lb)   09/04/24 98.4 kg (216 lb 14.9 oz)   05/30/24 101.8 kg (224 lb 6.9 oz)   02/05/24 102.8 kg (226 lb 10.1 oz)   12/07/23 102.8 kg (226 lb 10.1 oz)   11/30/23 101.8 kg (224 lb 5.1 oz)   11/27/23 103.7 kg (228 lb 9.9 oz)   09/14/23 97.1 kg (214 lb)   07/13/23 97.3 kg (214 lb 8.1 oz)   06/21/23 96.1 kg (211 lb 13.8 oz)   06/07/23 97 kg (213 lb 13.5 oz)   01/12/23 95.8 kg (211 lb 5 oz)   11/09/22 94 kg (207 lb 5.5 oz)        Medical History  Past Medical History:   Diagnosis Date    Back ache 4/2/2013    Back surgery 10/2012    Coronary atherosclerosis of native coronary artery 4/2/2013 2/28/2013. 3 V CAD. CABG 3/4/2013: BRUNSON LAD, SVG1 D1, SVG2 RI & OM1, SVG3 RCA    Hx of CABG 4/2/2013    Hypercholesterolemia 4/2/2013    Left bundle branch block 8/30/2016 8/30/2016: Diagnosed.    Overweight 8/22/2018 8/22/2018: Weight 96 kg. BMI 28.       Review of Systems   Constitutional:  Negative for  "appetite change and diaphoresis.   HENT:  Negative for trouble swallowing.    Respiratory:  Negative for shortness of breath.    Cardiovascular:  Negative for chest pain.   Gastrointestinal:  Negative for diarrhea, nausea and vomiting.   Musculoskeletal:  Negative for gait problem.   Skin:  Positive for color change.   Neurological:  Negative for dizziness and seizures.   Psychiatric/Behavioral:  Negative for hallucinations.          Surgical hx, family hx, social hx   Have been reviewed      Current Outpatient Medications:     aspirin (ECOTRIN) 81 MG EC tablet, Take 1 tablet (81 mg total) by mouth once daily., Disp: , Rfl:     CALCIUM CARBONATE/VITAMIN D3 (VITAMIN D-3 ORAL), Take 2,000 Int'l Units by mouth once daily., Disp: , Rfl:     ezetimibe (ZETIA) 10 mg tablet, Take 1 tablet (10 mg total) by mouth once daily., Disp: 90 tablet, Rfl: 3    FIBER-CAPS, PSYLLIUM HUSK, ORAL, , Disp: , Rfl:     ketoconazole (NIZORAL) 2 % shampoo, Apply topically twice a week., Disp: 120 mL, Rfl: 0    multivitamin (THERAGRAN) per tablet, Take 1 tablet by mouth once daily., Disp: , Rfl:     naproxen sodium (ANAPROX) 220 MG tablet, Take 220 mg by mouth 2 (two) times daily with meals., Disp: , Rfl:     ramipriL (ALTACE) 10 MG capsule, Take 1 capsule (10 mg total) by mouth once daily., Disp: 90 capsule, Rfl: 3    rosuvastatin (CRESTOR) 40 MG Tab, Take 1 tablet (40 mg total) by mouth once daily., Disp: 90 tablet, Rfl: 3    triamcinolone acetonide 0.5% (KENALOG) 0.5 % Crea, Apply topically 2 (two) times daily., Disp: 15 g, Rfl: 1    ubidecarenone (COQ-10 ORAL), , Disp: , Rfl:     Objective:        Body mass index is 29.61 kg/m².  Vitals:    03/26/25 1056   BP: (!) (P) 142/72   Pulse: (!) 55   SpO2: 96%   Weight: 101.8 kg (224 lb 6.9 oz)   Height: 6' 1" (1.854 m)   PainSc:   5     Physical Exam  Vitals and nursing note reviewed.   Constitutional:       General: He is not in acute distress.     Appearance: Normal appearance. He is not " ill-appearing.   HENT:      Head: Normocephalic and atraumatic.   Eyes:      General: No scleral icterus.  Cardiovascular:      Rate and Rhythm: Normal rate and regular rhythm.      Heart sounds: Normal heart sounds.   Pulmonary:      Effort: Pulmonary effort is normal.   Musculoskeletal:         General: No deformity.   Skin:     Comments: Pink pearly lesions with excoriations crusted left forearm confounding by multiple excoriations across arms   Neurological:      Mental Status: He is alert and oriented to person, place, and time.   Psychiatric:         Behavior: Behavior normal.           Lab Results   Component Value Date    WBC 5.49 03/26/2025    HGB 14.0 03/26/2025    HCT 41.3 03/26/2025     03/26/2025    CHOL 132 08/28/2024    TRIG 102 08/28/2024    HDL 42 08/28/2024    ALT 42 03/26/2025    AST 39 03/26/2025     03/26/2025    K 4.3 03/26/2025     03/26/2025    CREATININE 0.9 03/26/2025    BUN 24 (H) 03/26/2025    CO2 25 03/26/2025    TSH 1.103 08/28/2024    PSA 2.8 08/28/2024    INR 1.0 02/05/2024    HGBA1C 5.8 (H) 08/28/2024       The 10-year ASCVD risk score (Bhupinder RIVERA, et al., 2019) is: 20.2%    Values used to calculate the score:      Age: 69 years      Sex: Male      Is Non- : No      Diabetic: No      Tobacco smoker: No      Systolic Blood Pressure: 142 mmHg      Is BP treated: Yes      HDL Cholesterol: 42 mg/dL      Total Cholesterol: 132 mg/dL    On statin 40    (Imaging have been independently reviewed)    No AAA    Assessment:         1. Primary hypertension    2. Skin lesion    3. Prediabetes    4. Coronary artery disease involving native coronary artery of native heart without angina pectoris    5. At risk for bleeding    6. Hearing loss, unspecified hearing loss type, unspecified laterality                Plan:     Richie was seen today for annual exam.    Diagnoses and all orders for this visit:    Primary hypertension  -     Comprehensive Metabolic  Panel; Future    Skin lesion  -     Ambulatory referral/consult to Dermatology; Future    Prediabetes  -     Hemoglobin A1C; Future    Coronary artery disease involving native coronary artery of native heart without angina pectoris  -     Lipid Panel; Future    At risk for bleeding  -     CBC Without Differential; Future    Hearing loss, unspecified hearing loss type, unspecified laterality  -     Ambulatory referral/consult to ENT; Future  -     Ambulatory referral/consult to Audiology; Future              Health Maintenance  - Lipids:   - A1C:   - Colon Ca Screen: 2021 Dr. Valente 2021, due in 7 years, plans for just stool sample at that point  - Immunizations: shingles vaccinated; covid vaccine and booster   - PSA       Follow up in about 6 months (around 9/26/2025). or sooner prn        33min were used in chart review, evaluation and counseling of patient, documentation and review of results on same day of service     Visit today is associated with current or anticipated ongoing medical care related to this patient's single serious condition/complex condition of worsening unknown etiology anemia. The patient will return to see me as these issues will be followed longitudinally.      All medications were reviewed including potential side effects and risks/benefits.  Pt was counseled to call back if anything worsens or if questions arise.    Minh Means MD  Family Medicine  [facility]  [address]  [address]  [place] [address]  Phone [phone]  Fax [phone]

## 2025-03-28 ENCOUNTER — TELEPHONE (OUTPATIENT)
Dept: DERMATOLOGY | Facility: CLINIC | Age: 70
End: 2025-03-28
Payer: MEDICARE

## 2025-03-30 ENCOUNTER — RESULTS FOLLOW-UP (OUTPATIENT)
Dept: INTERNAL MEDICINE | Facility: CLINIC | Age: 70
End: 2025-03-30

## 2025-03-31 ENCOUNTER — PATIENT MESSAGE (OUTPATIENT)
Dept: ADMINISTRATIVE | Facility: HOSPITAL | Age: 70
End: 2025-03-31
Payer: MEDICARE

## 2025-04-01 ENCOUNTER — TELEPHONE (OUTPATIENT)
Dept: ADMINISTRATIVE | Facility: HOSPITAL | Age: 70
End: 2025-04-01
Payer: MEDICARE

## 2025-04-01 VITALS — SYSTOLIC BLOOD PRESSURE: 131 MMHG | DIASTOLIC BLOOD PRESSURE: 72 MMHG

## 2025-04-10 NOTE — H&P
[facility] - Surgery ([place])  History & Physical    Subjective:      Pain weakness left shoulder many months.  History of right rotator cuff repair with me a couple of years ago has done quite well same problems on the left consenting left rotator cuff repair significant risk discussed    Patient Active Problem List    Diagnosis Date Noted    Prediabetes 06/07/2023    Complete tear of right rotator cuff 10/29/2021    Hypertension 09/17/2020    Overweight 08/22/2018    History of chest pain 08/30/2016    Right bundle branch block 08/30/2016    History of coronary artery bypass surgery 04/02/2013    Hypercholesterolemia 04/02/2013    Back ache 04/02/2013    Coronary artery disease      Past Medical History:   Diagnosis Date    Back ache 4/2/2013    Back surgery 10/2012    Coronary atherosclerosis of native coronary artery 4/2/2013 2/28/2013. 3 V CAD. CABG 3/4/2013: BRUNSON LAD, SVG1 D1, SVG2 RI & OM1, SVG3 RCA    Hx of CABG 4/2/2013    Hypercholesterolemia 4/2/2013    Left bundle branch block 8/30/2016 8/30/2016: Diagnosed.    Overweight 8/22/2018 8/22/2018: Weight 96 kg. BMI 28.     Past Surgical History:   Procedure Laterality Date    BACK SURGERY  2012    CARDIAC SURGERY  2013    CABG    COLONOSCOPY      CORONARY ARTERY BYPASS GRAFT      knee arthoscopy      left    removal of growth on upper lip      ROTATOR CUFF REPAIR Right 10/29/2021    Procedure: REPAIR, ROTATOR CUFF;  Surgeon: Saul Gonzalez MD;  Location: [facility];  Service: Orthopedics;  Laterality: Right;    SHOULDER ARTHROSCOPY Right 10/29/2021    Procedure: ARTHROSCOPY, SHOULDER WITH MINI OPEN;  Surgeon: Saul Gonzalez MD;  Location: [facility];  Service: Orthopedics;  Laterality: Right;    SPINE SURGERY       Prescriptions Prior to Admission[1]  Review of patient's allergies indicates:  No Known Allergies  Social History     Tobacco Use    Smoking status: Former     Current packs/day: 0.00     Average packs/day: 1.5 packs/day for 15.0 years  (22.5 ttl pk-yrs)     Types: Cigarettes     Start date: 1983     Quit date: 1998     Years since quittin.2    Smokeless tobacco: Never   Substance Use Topics    Alcohol use: Yes     Alcohol/week: 7.0 standard drinks of alcohol     Types: 7 Glasses of wine per week     Family History   Problem Relation Name Age of Onset    No Known Problems Mother      Heart disease Father      Hypertension Father      Asthma Sister Jyothi     Throat cancer Paternal Grandfather      Colon cancer Neg Hx      Prostate cancer Neg Hx       Social History[2]    No medications prior to admission.     Review of patient's allergies indicates:  No Known Allergies     Review of Systems:  All other systems reviewed and are negative.  .    No current facility-administered medications for this encounter.     Current Outpatient Medications   Medication Sig    aspirin (ECOTRIN) 81 MG EC tablet Take 1 tablet (81 mg total) by mouth once daily.    CALCIUM CARBONATE/VITAMIN D3 (VITAMIN D-3 ORAL) Take 2,000 Int'l Units by mouth once daily.    ezetimibe (ZETIA) 10 mg tablet Take 1 tablet (10 mg total) by mouth once daily.    FIBER-CAPS, PSYLLIUM HUSK, ORAL     ketoconazole (NIZORAL) 2 % shampoo Apply topically twice a week.    multivitamin (THERAGRAN) per tablet Take 1 tablet by mouth once daily.    naproxen sodium (ANAPROX) 220 MG tablet Take 220 mg by mouth 2 (two) times daily with meals.    ramipriL (ALTACE) 10 MG capsule Take 1 capsule (10 mg total) by mouth once daily.    rosuvastatin (CRESTOR) 40 MG Tab Take 1 tablet (40 mg total) by mouth once daily.    triamcinolone acetonide 0.5% (KENALOG) 0.5 % Crea Apply topically 2 (two) times daily.    ubidecarenone (COQ-10 ORAL)        Objective:      Vital Signs (Most Recent)       Vital Signs Range (Last 24H):  BP: ()/()   Arterial Line BP: ()/()     Physical Exam heart regular lungs clear weakness supraspinatus good active range of motion mild impingement    Imaging Review:   Full-thickness  supraspinatus tear.  Early degenerative changes degenerative labral tear      Assessment:      There are no hospital problems to display for this patient.      Plan:    The various methods of treatment have been discussed with the patient and family.   After consideration of risks, benefits and other options for treatment, the patient has consented to surgical interventions (significant risk discussed with failure to heal continued pain hopefully same success as of the right).  Questions were answered and Pre-op teaching was done by me.        [1]   No medications prior to admission.   [2]   Social History  Tobacco Use    Smoking status: Former     Current packs/day: 0.00     Average packs/day: 1.5 packs/day for 15.0 years (22.5 ttl pk-yrs)     Types: Cigarettes     Start date: 1983     Quit date: 1998     Years since quittin.2    Smokeless tobacco: Never   Substance Use Topics    Alcohol use: Yes     Alcohol/week: 7.0 standard drinks of alcohol     Types: 7 Glasses of wine per week    Drug use: No

## 2025-04-15 ENCOUNTER — ANESTHESIA EVENT (OUTPATIENT)
Dept: SURGERY | Facility: OTHER | Age: 70
End: 2025-04-15
Payer: MEDICARE

## 2025-04-16 ENCOUNTER — HOSPITAL ENCOUNTER (OUTPATIENT)
Dept: PREADMISSION TESTING | Facility: OTHER | Age: 70
Discharge: HOME | End: 2025-04-16
Attending: ORTHOPAEDIC SURGERY
Payer: MEDICARE

## 2025-04-16 VITALS
WEIGHT: 210 LBS | BODY MASS INDEX: 27.83 KG/M2 | OXYGEN SATURATION: 97 % | SYSTOLIC BLOOD PRESSURE: 134 MMHG | DIASTOLIC BLOOD PRESSURE: 63 MMHG | TEMPERATURE: 98 F | RESPIRATION RATE: 17 BRPM | HEART RATE: 68 BPM | HEIGHT: 73 IN

## 2025-04-16 RX ORDER — ACETAMINOPHEN 500 MG
1000 TABLET ORAL
OUTPATIENT
Start: 2025-04-16 | End: 2025-04-16

## 2025-04-16 RX ORDER — LIDOCAINE HYDROCHLORIDE 10 MG/ML
0.5 INJECTION, SOLUTION EPIDURAL; INFILTRATION; INTRACAUDAL; PERINEURAL ONCE
OUTPATIENT
Start: 2025-04-16 | End: 2025-04-16

## 2025-04-16 RX ORDER — SODIUM CHLORIDE, SODIUM LACTATE, POTASSIUM CHLORIDE, CALCIUM CHLORIDE 600; 310; 30; 20 MG/100ML; MG/100ML; MG/100ML; MG/100ML
INJECTION, SOLUTION INTRAVENOUS CONTINUOUS
OUTPATIENT
Start: 2025-04-16

## 2025-04-16 NOTE — ANESTHESIA PREPROCEDURE EVALUATION
04/15/2025  Richie Johnson is a 69 y.o., male.      Pre-op Assessment    I have reviewed the Patient Summary Reports.     I have reviewed the Nursing Notes. I have reviewed the NPO Status.   I have reviewed the Medications.     Review of Systems  Anesthesia Hx:             Denies Family Hx of Anesthesia complications.    Denies Personal Hx of Anesthesia complications.                    Social:  Former Smoker, Alcohol Use 7 drinks per week      Hematology/Oncology:  Hematology Normal   Oncology Normal                                   EENT/Dental:  EENT/Dental Normal           Cardiovascular:     Hypertension   CAD (HX of CABGx5 2013)    Dysrhythmias (LBBB & RBBB)           Hx MI/CABG 2013    Stress test 2020: negative    ECHO 2023:  EF 65%. Grade II diastolic dysfunction.  Moderately dilated left atrium.  Trace mitral and tricuspid regurg.   Normal aortic valve                             Pulmonary:  Pulmonary Normal                       Renal/:  Renal/ Normal                 Hepatic/GI:  Hepatic/GI Normal                    Musculoskeletal:  Musculoskeletal Normal                Neurological:  Neurology Normal                                      Endocrine:  Endocrine Normal    prediabetes        Dermatological:  Skin Normal    Psych:  Psychiatric Normal                  Physical Exam  General: Well nourished, Cooperative, Alert and Oriented    Airway:  Mallampati: II   Mouth Opening: Normal  TM Distance: Normal  Tongue: Normal  Neck ROM: Normal ROM    Dental:  Intact, Caps / Implants      Anesthesia Plan  Type of Anesthesia, risks & benefits discussed:    Anesthesia Type: Gen ETT  Intra-op Monitoring Plan: Standard ASA Monitors  Post Op Pain Control Plan: multimodal analgesia  Induction:  IV  Informed Consent: Informed consent signed with the Patient and all parties understand the risks and agree  with anesthesia plan.  All questions answered.   ASA Score: 3  Anesthesia Plan Notes: CBC, CMP 03/2025 in EPIC  CABGx5 2013  Cardiac clearance in addendum of cardiology note 11/2024  Will need consents day of surgery        Ready For Surgery From Anesthesia Perspective.     .

## 2025-04-16 NOTE — DISCHARGE INSTRUCTIONS
Information to Prepare you for your Surgery    PRE-ADMIT TESTING -  [phone]   -Elisa  [address] [place] [address] [facility] [address]  [facility]      Your surgery has been scheduled at [facility]. We are pleased to have the opportunity to serve you. For Further Information please call [phone].    On the day of surgery please report to the Information Desk on the 1st floor.    CONTACT YOUR PHYSICIAN'S OFFICE THE DAY PRIOR TO YOUR SURGERY TO OBTAIN YOUR ARRIVAL TIME.     The evening before surgery do not eat anything after 9 p.m. ( this includes hard candy, chewing gum and mints). You may only have GATORADE, POWERADE AND WATER  from 9 p.m. until you leave your home.    AT LEAST 12 OUNCES BEFORE YOU LEAVE YOU HOUSE IN THE MORNING TO COME TO SURGERY.    DO NOT DRINK ANY LIQUIDS ON THE WAY TO THE HOSPITAL.      Why does your anesthesiologist allow you to drink Gatorade/Powerade before surgery?  Gatorade/Powerade helps to increase your comfort before surgery and to decrease your nausea after surgery. The carbohydrates in Gatorade/Powerade help reduce your body's stress response to surgery.    Outpatient Surgery- May allow 2 adult (18 and older) Support Persons (1 being the designated ) for all surgical/procedural patients. A breastfeeding mother will be allowed her infant and 2 adult Support Persons. No one under the age of 18 will be allowed in the building. No swapping out of visitors in the [facility].      SPECIAL MEDICATION INSTRUCTIONS: TAKE medications checked off  on your Medication List.    Please bring blood pressure medications  the day of surgery.         Surgery Patients:    If you take ASPIRIN - Your PHYSICIAN/SURGEON will need to inform you IF/OR when you need to stop taking aspirin prior to your surgery.     The week prior to surgery do not ot take any medications containing IBUPROFEN or NSAIDS ( Advil,  Motrin, Goodys, BC, Aleve, Naproxen,  Ketorolac, Meloxicam, Mobic, Toradol,etc) If you are not sure if you should take a medicine please call your surgeon's office.  Ok to take Tylenol    Do Not Wear any make-up (especially eye make-up) to surgery. Please remove any false eyelashes or eyelash extensions. If you arrive the day of surgery with makeup/eyelashes on you will be required to remove prior to surgery. (There is a risk of corneal abrasions if eye makeup/eyelash extensions are not removed)      Leave all valuables at home.   Do Not wear any jewelry or watches, including any metal in body piercings. Jewelry must be removed prior to coming to the hospital.  There is a possibility that rings that are unable to be removed may be cut off if they are on the surgical extremity.    Please remove all hair extensions, wigs, clips and any other metal accessories/ ornaments from your hair.  These items may pose a flammable/fire risk in Surgery and must be removed.    Do not shave your surgical area at least 5 days prior to your surgery. The surgical prep will be performed at the hospital according to Infection Control regulations.    Contact Lens must be removed before surgery. Either do not wear the contact lens or bring a case and solution for storage.  Please bring a container for eyeglasses or dentures as required.  Bring any paperwork your physician has provided, such as consent forms,  history and physicals, doctor's orders, etc.   Bring comfortable clothes that are loose fitting to wear upon discharge. Take into consideration the type of surgery being performed.  Maintain your diet as advised per your physician the day prior to surgery.      Adequate rest the night before surgery is advised.   Park in the Parking lot behind the hospital or in the [employer] [facility] across the street from the parking lot. Parking is complimentary.  If you will be discharged the same day as your procedure, please arrange for a  responsible adult to drive you home or to accompany you if traveling by taxi.   YOU WILL NOT BE PERMITTED TO DRIVE OR TO LEAVE THE HOSPITAL ALONE AFTER SURGERY.   If you are being discharged the same day, it is strongly recommended that you arrange for someone to remain with you for the first 24 hrs following your surgery.    The Surgeon will speak to your family/visitor after your surgery regarding the outcome of your surgery and post op care.  The Surgeon may speak to you after your surgery, but there is a possibility you may not remember the details.  Please check with your family members regarding the conversation with the Surgeon.    We strongly recommend whoever is bringing you home be present for discharge instructions.  This will ensure a thorough understanding for your post op home care.    If the patient has fever, cough, or signs/symptoms of Flu or Covid please do not come in for your surgery. Contact your surgeon and your primary care physician for further instructions.           Thank you for your cooperation.  The Staff of [facility].            Bathing Instructions with Hibiclens or Dial Soap    Shower the evening before and morning of your procedure with Chlorhexidine (Hibiclens)  do not use Chlorhexidine on your face or genitals. Do not get in your eyes.  Wash your face with water and your regular face wash/soap  Use your regular shampoo  Apply Chlorhexidine (Hibiclens) directly on your skin or on a wet washcloth and wash gently. When showering: Move away from the shower stream when applying Chlorhexidine (Hibiclens) to avoid rinsing off too soon.  Rinse thoroughly with warm water  Do not dilute Chlorhexidine (Hibiclens)   Dry off as usual, do not use any deodorant, powder, body lotions, perfume, after shave or cologne.   Place clean sheets on bed day before surgery.    Thanks Elisa

## 2025-04-21 NOTE — PRE ADMISSION SCREENING
GERALDO SINGH reviewed  Dr Anaya addendum to 11/20/24 progress note with cardiac clearance addendum to note.

## 2025-04-25 ENCOUNTER — ANESTHESIA (OUTPATIENT)
Dept: SURGERY | Facility: OTHER | Age: 70
End: 2025-04-25
Payer: MEDICARE

## 2025-04-25 ENCOUNTER — HOSPITAL ENCOUNTER (OUTPATIENT)
Facility: OTHER | Age: 70
Discharge: HOME | End: 2025-04-25
Attending: ORTHOPAEDIC SURGERY | Admitting: ORTHOPAEDIC SURGERY
Payer: MEDICARE

## 2025-04-25 VITALS
TEMPERATURE: 98 F | OXYGEN SATURATION: 96 % | DIASTOLIC BLOOD PRESSURE: 74 MMHG | SYSTOLIC BLOOD PRESSURE: 126 MMHG | HEART RATE: 58 BPM | HEIGHT: 73 IN | RESPIRATION RATE: 18 BRPM | BODY MASS INDEX: 27.83 KG/M2 | WEIGHT: 210 LBS

## 2025-04-25 DIAGNOSIS — M75.102 LEFT ROTATOR CUFF TEAR: ICD-10-CM

## 2025-04-25 PROBLEM — M75.122 COMPLETE ROTATOR CUFF TEAR OR RUPTURE OF LEFT SHOULDER, NOT SPECIFIED AS TRAUMATIC: Status: ACTIVE | Noted: 2021-10-29

## 2025-04-25 PROCEDURE — 37000008 HC ANESTHESIA 1ST 15 MINUTES: Performed by: ORTHOPAEDIC SURGERY

## 2025-04-25 PROCEDURE — 63600175 PHARM REV CODE 636 W HCPCS: Performed by: ORTHOPAEDIC SURGERY

## 2025-04-25 PROCEDURE — 37000009 HC ANESTHESIA EA ADD 15 MINS: Performed by: ORTHOPAEDIC SURGERY

## 2025-04-25 PROCEDURE — 27201423 OPTIME MED/SURG SUP & DEVICES STERILE SUPPLY: Performed by: ORTHOPAEDIC SURGERY

## 2025-04-25 PROCEDURE — 25000003 PHARM REV CODE 250: Performed by: STUDENT IN AN ORGANIZED HEALTH CARE EDUCATION/TRAINING PROGRAM

## 2025-04-25 PROCEDURE — 71000033 HC RECOVERY, INTIAL HOUR: Performed by: ORTHOPAEDIC SURGERY

## 2025-04-25 PROCEDURE — 63600175 PHARM REV CODE 636 W HCPCS: Performed by: ANESTHESIOLOGY

## 2025-04-25 PROCEDURE — 25000003 PHARM REV CODE 250: Performed by: ANESTHESIOLOGY

## 2025-04-25 PROCEDURE — 71000015 HC POSTOP RECOV 1ST HR: Performed by: ORTHOPAEDIC SURGERY

## 2025-04-25 PROCEDURE — 71000016 HC POSTOP RECOV ADDL HR: Performed by: ORTHOPAEDIC SURGERY

## 2025-04-25 PROCEDURE — 63600175 PHARM REV CODE 636 W HCPCS: Performed by: STUDENT IN AN ORGANIZED HEALTH CARE EDUCATION/TRAINING PROGRAM

## 2025-04-25 PROCEDURE — 36000710: Performed by: ORTHOPAEDIC SURGERY

## 2025-04-25 PROCEDURE — C1713 ANCHOR/SCREW BN/BN,TIS/BN: HCPCS | Performed by: ORTHOPAEDIC SURGERY

## 2025-04-25 PROCEDURE — 71000039 HC RECOVERY, EACH ADD'L HOUR: Performed by: ORTHOPAEDIC SURGERY

## 2025-04-25 PROCEDURE — 36000711: Performed by: ORTHOPAEDIC SURGERY

## 2025-04-25 DEVICE — ANCHOR Y-KNOT PRO RC 3 2.8MM: Type: IMPLANTABLE DEVICE | Site: SHOULDER | Status: FUNCTIONAL

## 2025-04-25 RX ORDER — MUPIROCIN 20 MG/G
OINTMENT TOPICAL 2 TIMES DAILY
Status: DISCONTINUED | OUTPATIENT
Start: 2025-04-25 | End: 2025-04-25 | Stop reason: HOSPADM

## 2025-04-25 RX ORDER — SODIUM CHLORIDE 0.9 % (FLUSH) 0.9 %
2 SYRINGE (ML) INJECTION ONCE
Status: DISCONTINUED | OUTPATIENT
Start: 2025-04-25 | End: 2025-04-25 | Stop reason: HOSPADM

## 2025-04-25 RX ORDER — SODIUM CHLORIDE 0.9 % (FLUSH) 0.9 %
5 SYRINGE (ML) INJECTION
Status: DISCONTINUED | OUTPATIENT
Start: 2025-04-25 | End: 2025-04-25 | Stop reason: HOSPADM

## 2025-04-25 RX ORDER — ROCURONIUM BROMIDE 10 MG/ML
INJECTION, SOLUTION INTRAVENOUS
Status: DISCONTINUED | OUTPATIENT
Start: 2025-04-25 | End: 2025-04-25

## 2025-04-25 RX ORDER — EPINEPHRINE 1 MG/ML
INJECTION, SOLUTION, CONCENTRATE INTRAVENOUS
Status: DISCONTINUED | OUTPATIENT
Start: 2025-04-25 | End: 2025-04-25 | Stop reason: HOSPADM

## 2025-04-25 RX ORDER — SODIUM CHLORIDE, SODIUM LACTATE, POTASSIUM CHLORIDE, CALCIUM CHLORIDE 600; 310; 30; 20 MG/100ML; MG/100ML; MG/100ML; MG/100ML
INJECTION, SOLUTION INTRAVENOUS CONTINUOUS
Status: DISCONTINUED | OUTPATIENT
Start: 2025-04-25 | End: 2025-04-25 | Stop reason: HOSPADM

## 2025-04-25 RX ORDER — ONDANSETRON HYDROCHLORIDE 2 MG/ML
4 INJECTION, SOLUTION INTRAVENOUS DAILY PRN
Status: DISCONTINUED | OUTPATIENT
Start: 2025-04-25 | End: 2025-04-25 | Stop reason: HOSPADM

## 2025-04-25 RX ORDER — PROPOFOL 10 MG/ML
VIAL (ML) INTRAVENOUS
Status: DISCONTINUED | OUTPATIENT
Start: 2025-04-25 | End: 2025-04-25

## 2025-04-25 RX ORDER — PROCHLORPERAZINE EDISYLATE 5 MG/ML
5 INJECTION INTRAMUSCULAR; INTRAVENOUS EVERY 30 MIN PRN
Status: DISCONTINUED | OUTPATIENT
Start: 2025-04-25 | End: 2025-04-25 | Stop reason: HOSPADM

## 2025-04-25 RX ORDER — ACETAMINOPHEN 500 MG
1000 TABLET ORAL
Status: COMPLETED | OUTPATIENT
Start: 2025-04-25 | End: 2025-04-25

## 2025-04-25 RX ORDER — LIDOCAINE HYDROCHLORIDE 10 MG/ML
0.5 INJECTION, SOLUTION EPIDURAL; INFILTRATION; INTRACAUDAL; PERINEURAL ONCE
Status: DISCONTINUED | OUTPATIENT
Start: 2025-04-25 | End: 2025-04-25 | Stop reason: HOSPADM

## 2025-04-25 RX ORDER — LIDOCAINE HYDROCHLORIDE 20 MG/ML
INJECTION INTRAVENOUS
Status: DISCONTINUED | OUTPATIENT
Start: 2025-04-25 | End: 2025-04-25

## 2025-04-25 RX ORDER — ONDANSETRON HYDROCHLORIDE 2 MG/ML
INJECTION, SOLUTION INTRAVENOUS
Status: DISCONTINUED | OUTPATIENT
Start: 2025-04-25 | End: 2025-04-25

## 2025-04-25 RX ORDER — HYDROMORPHONE HYDROCHLORIDE 2 MG/ML
0.4 INJECTION, SOLUTION INTRAMUSCULAR; INTRAVENOUS; SUBCUTANEOUS EVERY 5 MIN PRN
Status: DISCONTINUED | OUTPATIENT
Start: 2025-04-25 | End: 2025-04-25 | Stop reason: HOSPADM

## 2025-04-25 RX ORDER — SUCCINYLCHOLINE CHLORIDE 20 MG/ML
INJECTION INTRAMUSCULAR; INTRAVENOUS
Status: DISCONTINUED | OUTPATIENT
Start: 2025-04-25 | End: 2025-04-25

## 2025-04-25 RX ORDER — SODIUM CHLORIDE 9 MG/ML
INJECTION, SOLUTION INTRAVENOUS CONTINUOUS
Status: DISCONTINUED | OUTPATIENT
Start: 2025-04-25 | End: 2025-04-25 | Stop reason: HOSPADM

## 2025-04-25 RX ORDER — CEFAZOLIN 2 G/1
2 INJECTION, POWDER, FOR SOLUTION INTRAMUSCULAR; INTRAVENOUS
Status: DISCONTINUED | OUTPATIENT
Start: 2025-04-25 | End: 2025-04-25 | Stop reason: HOSPADM

## 2025-04-25 RX ORDER — DEXAMETHASONE SODIUM PHOSPHATE 4 MG/ML
INJECTION, SOLUTION INTRA-ARTICULAR; INTRALESIONAL; INTRAMUSCULAR; INTRAVENOUS; SOFT TISSUE
Status: DISCONTINUED | OUTPATIENT
Start: 2025-04-25 | End: 2025-04-25

## 2025-04-25 RX ORDER — FENTANYL CITRATE 50 UG/ML
INJECTION, SOLUTION INTRAMUSCULAR; INTRAVENOUS
Status: DISCONTINUED | OUTPATIENT
Start: 2025-04-25 | End: 2025-04-25

## 2025-04-25 RX ORDER — CEFAZOLIN SODIUM 1 G/3ML
INJECTION, POWDER, FOR SOLUTION INTRAMUSCULAR; INTRAVENOUS
Status: DISCONTINUED | OUTPATIENT
Start: 2025-04-25 | End: 2025-04-25

## 2025-04-25 RX ORDER — OXYCODONE HYDROCHLORIDE 5 MG/1
5 TABLET ORAL
Status: DISCONTINUED | OUTPATIENT
Start: 2025-04-25 | End: 2025-04-25 | Stop reason: HOSPADM

## 2025-04-25 RX ORDER — ROPIVACAINE HYDROCHLORIDE 5 MG/ML
INJECTION, SOLUTION EPIDURAL; INFILTRATION; PERINEURAL
Status: DISCONTINUED | OUTPATIENT
Start: 2025-04-25 | End: 2025-04-25 | Stop reason: HOSPADM

## 2025-04-25 RX ORDER — HYDROCODONE BITARTRATE AND ACETAMINOPHEN 10; 325 MG/1; MG/1
1 TABLET ORAL EVERY 6 HOURS PRN
Qty: 30 TABLET | Refills: 0 | Status: SHIPPED | OUTPATIENT
Start: 2025-04-25

## 2025-04-25 RX ORDER — GLUCAGON 1 MG
1 KIT INJECTION
Status: DISCONTINUED | OUTPATIENT
Start: 2025-04-25 | End: 2025-04-25 | Stop reason: HOSPADM

## 2025-04-25 RX ADMIN — CEFAZOLIN 2 G: 330 INJECTION, POWDER, FOR SOLUTION INTRAMUSCULAR; INTRAVENOUS at 07:04

## 2025-04-25 RX ADMIN — SUGAMMADEX 200 MG: 100 INJECTION, SOLUTION INTRAVENOUS at 08:04

## 2025-04-25 RX ADMIN — SUCCINYLCHOLINE CHLORIDE 140 MG: 20 INJECTION, SOLUTION INTRAMUSCULAR; INTRAVENOUS at 07:04

## 2025-04-25 RX ADMIN — ROCURONIUM BROMIDE 30 MG: 10 SOLUTION INTRAVENOUS at 07:04

## 2025-04-25 RX ADMIN — PHENYLEPHRINE HYDROCHLORIDE 0.3 MCG/KG/MIN: 10 INJECTION INTRAVENOUS at 07:04

## 2025-04-25 RX ADMIN — SODIUM CHLORIDE, SODIUM LACTATE, POTASSIUM CHLORIDE, AND CALCIUM CHLORIDE: .6; .31; .03; .02 INJECTION, SOLUTION INTRAVENOUS at 07:04

## 2025-04-25 RX ADMIN — OXYCODONE HYDROCHLORIDE 5 MG: 5 TABLET ORAL at 08:04

## 2025-04-25 RX ADMIN — DEXAMETHASONE SODIUM PHOSPHATE 4 MG: 4 INJECTION, SOLUTION INTRAMUSCULAR; INTRAVENOUS at 07:04

## 2025-04-25 RX ADMIN — HYDROMORPHONE HYDROCHLORIDE 0.4 MG: 2 INJECTION INTRAMUSCULAR; INTRAVENOUS; SUBCUTANEOUS at 08:04

## 2025-04-25 RX ADMIN — ACETAMINOPHEN 1000 MG: 500 TABLET ORAL at 05:04

## 2025-04-25 RX ADMIN — PROPOFOL 150 MG: 10 INJECTION, EMULSION INTRAVENOUS at 07:04

## 2025-04-25 RX ADMIN — LIDOCAINE HYDROCHLORIDE 80 MG: 20 INJECTION, SOLUTION INTRAVENOUS at 07:04

## 2025-04-25 RX ADMIN — FENTANYL CITRATE 100 MCG: 50 INJECTION, SOLUTION INTRAMUSCULAR; INTRAVENOUS at 07:04

## 2025-04-25 RX ADMIN — ONDANSETRON HYDROCHLORIDE 4 MG: 2 INJECTION INTRAMUSCULAR; INTRAVENOUS at 07:04

## 2025-04-25 NOTE — PLAN OF CARE
Richie Johnson has met all discharge criteria from Phase II. Vital Signs are stable, ambulating  without difficulty. Discharge instructions given, patient verbalized understanding. Discharged from facility via wheelchair in stable condition.      negative...

## 2025-04-25 NOTE — ANESTHESIA POSTPROCEDURE EVALUATION
Anesthesia Post Evaluation    Patient: Richie Johnson    Procedure(s) Performed: Procedure(s) (LRB):  REPAIR, ROTATOR CUFF (Left)  ARTHROSCOPY, SHOULDER, W/ DEBRIDEMENT (Left)  ARTHROSCOPY, SHOULDER, W/ SUBACROMIAL DECOMPRESSION (Left)    Final Anesthesia Type: general      Patient location during evaluation: PACU  Patient participation: Yes- Able to Participate  Level of consciousness: awake and alert  Post-procedure vital signs: reviewed and stable  Pain management: adequate  Airway patency: patent    PONV status at discharge: No PONV  Anesthetic complications: no      Cardiovascular status: blood pressure returned to baseline  Respiratory status: spontaneous ventilation  Hydration status: euvolemic  Follow-up not needed.          Vitals Value Taken Time   /74 04/25/25 10:15   Temp 36.4 °C (97.6 °F) 04/25/25 09:09   Pulse 58 04/25/25 10:15   Resp 18 04/25/25 10:15   SpO2 96 % 04/25/25 10:15         Event Time   Out of Recovery 09:05:00         Pain/Ev Score: Pain Rating Prior to Med Admin: 5 (4/25/2025  8:58 AM)  Pain Rating Post Med Admin: 2 (4/25/2025 10:15 AM)  Ev Score: 10 (4/25/2025 10:15 AM)

## 2025-04-25 NOTE — OP NOTE
[facility] Surgery ([facility]  Surgery Department  Operative Note    SUMMARY     Date of Procedure: 4/25/2025     Procedure: Procedure(s) (LRB):  REPAIR, ROTATOR CUFF (Left)  ARTHROSCOPY, SHOULDER, W/ DEBRIDEMENT (Left)  ARTHROSCOPY, SHOULDER, W/ SUBACROMIAL DECOMPRESSION (Left)     Surgeons and Role:     * Saul Gonzalez MD - Primary    Assisting Surgeon: None    Pre-Operative Diagnosis: Superior glenoid labrum lesion of left shoulder, initial encounter [S43.432A]  Complete rotator cuff tear or rupture of left shoulder, not specified as traumatic [M75.122]    Post-Operative Diagnosis: Post-Op Diagnosis Codes:     * Superior glenoid labrum lesion of left shoulder, initial encounter [S43.432A]     * Complete rotator cuff tear or rupture of left shoulder, not specified as traumatic [M75.122]    Anesthesia: Choice    Operative Findings (including complications, if any):  Left rotator cuff tear impingement degenerative labral tear chondromalacia osteoarthritis glenohumeral joint    Description of Technical Procedures:  Patient brought the operating room underwent general anesthesia positioned appropriately for the beach chair position exam anesthesia showed full range of motion no instability was prepped and draped in usual fashion.  Using posterior viewing portal anterior working portal he had some chondral debris throughout the glenohumeral joint and degenerative labral tearing.  This was cleaned up with a shaver.  He also had some cortisone deposits left over there that was cleaned up.  His biceps actually looked good.  Undersurface rotator cuff showed a typical supraspinatus tear peeling off the humerus.  When the glenohumeral joint was cleaned up and got rid of the debris attention then turned to subacromial space with a posterior viewing portal lateral working portal he did have some bursitis which was cleaned up with the electrothermal device.  He had some downsloping acromion which was removed with a bur and  a shaver.  He had good clear space that point.  Then the footprint of the rotator cuff was further cleaned up you see where was pulling off the lateral edge.  With a small lateral incision was self-retaining retractors were small deltoid split the footprint of the rotator cuff was further prepared and and 1 triple loaded anchor was placed multiple horizontal mattress sutures were employed the rotator cuff repair laid down nice and flat.  Ortho cord was used anterior-posterior reinforcement.  Shoulder was brought through range of motion showed no pressure on the repair and good clear space.  PRP was integrated in the repair.  0 Vicryl in the small deltoid split 4-0 Monocryl subcuticularly Steri-Strips on skin.  0.5% ropivacaine instilled the soft tissues placed in bulky sterile dressing brought to come sterile fashion     This performed with a poor recognition system    Significant Surgical Tasks Conducted by the Assistant(s), if Applicable:  Positioning    Estimated Blood Loss (EBL): * No values recorded between 4/25/2025  6:55 AM and 4/25/2025  7:58 AM * minimal           Implants:   Implant Name Type Inv. Item Serial No.  Lot No. LRB No. Used Action   ANCHOR Y-KNOT PRO RC 3 2.8MM - [id number]  ANCHOR Y-KNOT PRO RC 3 2.8MM  Photos [employer] Like [id number] Left 1 Implanted       Specimens:   Specimen (24h ago, onward)      None           * No specimens in log *           Condition: Good    Disposition: PACU - hemodynamically stable.    Attestation: Op Note Attestation: I was physically present and scrubbed for the entire procedure.

## 2025-04-25 NOTE — TRANSFER OF CARE
"Anesthesia Transfer of Care Note    Patient: Richie Johnson    Procedure(s) Performed: Procedure(s) (LRB):  REPAIR, ROTATOR CUFF (Left)  ARTHROSCOPY, SHOULDER, W/ DEBRIDEMENT (Left)  ARTHROSCOPY, SHOULDER, W/ SUBACROMIAL DECOMPRESSION (Left)    Patient location: PACU    Anesthesia Type: general    Transport from OR: Transported from OR on 6-10 L/min O2 by face mask with adequate spontaneous ventilation    Post pain: adequate analgesia    Post assessment: no apparent anesthetic complications and tolerated procedure well    Post vital signs: stable    Level of consciousness: awake and responds to stimulation    Nausea/Vomiting: no nausea/vomiting    Complications: none    Transfer of care protocol was followed      Last vitals: Visit Vitals  /67 (BP Location: Right arm, Patient Position: Lying)   Pulse (!) 50   Temp 36.3 °C (97.3 °F) (Skin)   Resp 16   Ht 6' 1" (1.854 m)   Wt 95.3 kg (210 lb)   SpO2 100%   BMI 27.71 kg/m²     "

## 2025-04-25 NOTE — BRIEF OP NOTE
[facility] - Surgery ([place])  Brief Operative Note    Surgery Date: 4/25/2025     Surgeons and Role:     * Saul Gonzalez MD - Primary    Assisting Surgeon: None    Pre-op Diagnosis:  Superior glenoid labrum lesion of left shoulder, initial encounter [S43.432A]  Complete rotator cuff tear or rupture of left shoulder, not specified as traumatic [M75.122]    Post-op Diagnosis:  Post-Op Diagnosis Codes:     * Superior glenoid labrum lesion of left shoulder, initial encounter [S43.432A]     * Complete rotator cuff tear or rupture of left shoulder, not specified as traumatic [M75.122]    Procedure(s) (LRB):  REPAIR, ROTATOR CUFF (Left)  ARTHROSCOPY, SHOULDER, W/ DEBRIDEMENT (Left)  ARTHROSCOPY, SHOULDER, W/ SUBACROMIAL DECOMPRESSION (Left)    Anesthesia: Choice    Operative Findings:  Left rotator cuff tear with chondromalacia arthritis glenohumeral joint degenerative labral tear impingement    Estimated Blood Loss: * No values recorded between 4/25/2025  6:55 AM and 4/25/2025  7:58 AM *         Specimens:   Specimen (24h ago, onward)      None            * No specimens in log *        Discharge Note    OUTCOME: Patient tolerated treatment/procedure well without complication and is now ready for discharge.    DISPOSITION: Home or Self Care    FINAL DIAGNOSIS:  Complete rotator cuff tear or rupture of left shoulder, not specified as traumatic    FOLLOWUP: In clinic    DISCHARGE INSTRUCTIONS:    Discharge Procedure Orders   Diet general     Other restrictions (specify):   Order Comments: Full Time     Leave dressing on - Keep it clean, dry, and intact until clinic visit     Call MD for:  temperature >100.4     Call MD for:  persistent nausea and vomiting     Call MD for:  severe uncontrolled pain     Call MD for:  difficulty breathing, headache or visual disturbances     Call MD for:  redness, tenderness, or signs of infection (pain, swelling, redness, odor or green/yellow discharge around incision site)     Call MD  for:  hives     Call MD for:  persistent dizziness or light-headedness     Call MD for:  extreme fatigue

## 2025-04-25 NOTE — ANESTHESIA PROCEDURE NOTES
Intubation    Date/Time: 4/25/2025 7:06 AM    Performed by: Axel Ny CRNA  Authorized by: Edward Newton MD    Intubation:     Induction:  Intravenous    Intubated:  Postinduction    Mask Ventilation:  Easy mask    Attempts:  1    Attempted By:  CRNA    Method of Intubation:  Video laryngoscopy    Blade:  Garrido 3    Laryngeal View Grade: Grade I - full view of cords      Difficult Airway Encountered?: No      Complications:  None    Airway Device:  Oral endotracheal tube    Airway Device Size:  7.5    Style/Cuff Inflation:  Cuffed (inflated to minimal occlusive pressure)    Tube secured:  24    Secured at:  The lips    Placement Verified By:  Capnometry    Complicating Factors:  None    Findings Post-Intubation:  BS equal bilateral and atraumatic/condition of teeth unchanged

## 2025-04-30 NOTE — TELEPHONE ENCOUNTER
Chief Complaint   Patient presents with    Migraine       Patient ID: Stormy Morrow is a 57 y.o. female.    HPI: I had the pleasure of seeing your patient today.  As you may know she is a 57-year-old female here for the management of migraine headaches and seizure.  She has had about 10-12 or so days of headaches within the last 30 days.  She typically has headache that initiates in the occipital head region however she usually will have a prodrome of spots in her visual fields followed by severe head pain.  She will have sensitivity to light and sound.  At its worst it is usually holocephalic and 10 out of 10.  Currently she receives Emgality injections monthly which did reduce the frequency and severity.  Abortively she has a prescription for Zavzpret which is very effective.  She also takes naproxen when needed.  No seizure-like activity for since last follow-up.  She is still taking Keppra as scheduled.    The following portions of the patient's history were reviewed and updated as appropriate: allergies, current medications, past family history, past medical history, past social history, past surgical history and problem list.    Review of Systems   Eyes:  Positive for photophobia (& phono occasionally with migraines), pain (occasionally with migraines) and visual disturbance (occasionally with migraines).   Neurological:  Positive for tremors (occasionally with migraines), speech difficulty (occasionally with migraines), light-headedness (occasionally with migraines) and headaches. Negative for dizziness, seizures, syncope, facial asymmetry, weakness (often with migraines) and numbness.   Psychiatric/Behavioral:  Positive for agitation (occasionally with migraines), confusion (occasionally with migraines) and decreased concentration (occasionally with migraines). Negative for behavioral problems, dysphoric mood, hallucinations, self-injury, sleep disturbance and suicidal ideas. The patient is not  Left voice mail for patient to call the office back to schedule lab appointment   nervous/anxious and is not hyperactive.       I have reviewed the review of systems above performed by my medical assistant.      Vitals:    04/30/25 1310   BP: 144/84   Pulse: 71   SpO2: 97%       Neurological Exam  Mental Status  Awake, alert and oriented to person, place and time. Recent and remote memory are intact. Speech is normal. Language is fluent with no aphasia. Attention and concentration are normal. Fund of knowledge is appropriate for level of education.    Cranial Nerves  CN I: Sense of smell is normal.  CN II: Visual acuity is normal.  CN III, IV, VI: Extraocular movements intact bilaterally. Pupils equal round and reactive to light bilaterally.  CN V: Facial sensation is normal.  CN VII: Full and symmetric facial movement.  CN XI: Shoulder shrug strength is normal.  CN XII: Tongue midline without atrophy or fasciculations.    Motor  Normal muscle bulk throughout. No fasciculations present. Normal muscle tone. No abnormal involuntary movements. No pronator drift.                                             Right                     Left  Rhomboids                            5                          5  Infraspinatus                          5                          5  Supraspinatus                       5                          5  Deltoid                                   5                          5   Biceps                                   5                          5  Brachioradialis                      5                          5   Triceps                                  5                          5   Pronator                                5                          5   Supinator                              5                           5   Wrist flexor                            5                          5   Wrist extensor                       5                          5   Finger flexor                          5                          5   Finger extensor                     5                           5   Interossei                              5                          5   Abductor pollicis brevis         5                          5   Flexor pollicis brevis             5                          5   Opponens pollicis                 5                          5  Extensor digitorum               5                          5  Abductor digiti minimi           5                          5   Abdominal                            5                          5  Glutei                                    5                          5  Hip abductor                         5                          5  Hip adductor                         5                          5   Iliopsoas                               5                          5   Quadriceps                           5                          5   Hamstring                             5                          5   Gastrocnemius                     5                           5   Anterior tibialis                      5                          5   Posterior tibialis                    5                          5   Peroneal                               5                          5  Ankle dorsiflexor                   5                          5  Ankle plantar flexor              5                           5  Extensor hallucis longus      5                           5    Sensory  Sensation is intact to light touch, pinprick, vibration and proprioception in all four extremities.    Reflexes  Deep tendon reflexes are 2+ and symmetric in all four extremities.    Right pathological reflexes: Sanna's absent.  Left pathological reflexes: Sanna's absent.    Coordination    Finger-to-nose, rapid alternating movements and heel-to-shin normal bilaterally without dysmetria.    Gait  Normal casual, toe, heel and tandem gait.       Physical Exam  Vitals reviewed.   Constitutional:       Appearance: She is well-developed.   HENT:      Head:  Normocephalic and atraumatic.   Eyes:      Extraocular Movements: Extraocular movements intact.      Pupils: Pupils are equal, round, and reactive to light.   Cardiovascular:      Rate and Rhythm: Normal rate and regular rhythm.   Pulmonary:      Breath sounds: Normal breath sounds.   Musculoskeletal:         General: Normal range of motion.   Skin:     General: Skin is warm.   Neurological:      Coordination: Coordination is intact.      Deep Tendon Reflexes: Reflexes are normal and symmetric.   Psychiatric:         Speech: Speech normal.         Procedures    Assessment/Plan:    We are going to try adding low-dose propranolol twice daily to her headache regimen to see if we can better improve the headache frequency and severity for her.  She will continue her medication regimen otherwise.  Will see her back in 3 months or sooner if needed.  A total of 30 minutes was spent face-to-face with the patient today.  Of that greater than 50% of this time was spent discussing signs and symptoms of migraine headaches, seizures, patient education, plan of care and prognosis.       Diagnoses and all orders for this visit:    1. Migraine with aura, not intractable, without status migrainosus (Primary)  -     propranolol (INDERAL) 40 MG tablet; Take 1 tablet by mouth 2 (Two) Times a Day for 30 days.  Dispense: 60 tablet; Refill: 5    2. Generalized convulsive seizures           Chuy Dasilva II, MD

## 2025-05-13 ENCOUNTER — PATIENT MESSAGE (OUTPATIENT)
Dept: CARDIOLOGY | Facility: CLINIC | Age: 70
End: 2025-05-13
Payer: MEDICARE

## 2025-05-13 ENCOUNTER — PATIENT MESSAGE (OUTPATIENT)
Dept: INTERNAL MEDICINE | Facility: CLINIC | Age: 70
End: 2025-05-13
Payer: MEDICARE

## 2025-05-13 VITALS — SYSTOLIC BLOOD PRESSURE: 136 MMHG | DIASTOLIC BLOOD PRESSURE: 68 MMHG

## 2025-05-14 ENCOUNTER — OFFICE VISIT (OUTPATIENT)
Dept: CARDIOLOGY | Facility: CLINIC | Age: 70
End: 2025-05-14
Attending: INTERNAL MEDICINE
Payer: MEDICARE

## 2025-05-14 VITALS
DIASTOLIC BLOOD PRESSURE: 64 MMHG | WEIGHT: 219 LBS | HEIGHT: 73 IN | HEART RATE: 66 BPM | BODY MASS INDEX: 29.03 KG/M2 | OXYGEN SATURATION: 96 % | SYSTOLIC BLOOD PRESSURE: 134 MMHG

## 2025-05-14 DIAGNOSIS — R73.03 PREDIABETES: ICD-10-CM

## 2025-05-14 DIAGNOSIS — E78.00 HYPERCHOLESTEROLEMIA: ICD-10-CM

## 2025-05-14 DIAGNOSIS — Z87.898 HISTORY OF CHEST PAIN: ICD-10-CM

## 2025-05-14 DIAGNOSIS — I45.10 RIGHT BUNDLE BRANCH BLOCK: ICD-10-CM

## 2025-05-14 DIAGNOSIS — Z95.1 HISTORY OF CORONARY ARTERY BYPASS SURGERY: ICD-10-CM

## 2025-05-14 DIAGNOSIS — I25.10 CORONARY ARTERY DISEASE INVOLVING NATIVE CORONARY ARTERY OF NATIVE HEART WITHOUT ANGINA PECTORIS: ICD-10-CM

## 2025-05-14 DIAGNOSIS — I10 PRIMARY HYPERTENSION: ICD-10-CM

## 2025-05-14 DIAGNOSIS — E66.3 OVERWEIGHT: ICD-10-CM

## 2025-05-14 PROCEDURE — 99999 PR PBB SHADOW E&M-EST. PATIENT-LVL III: CPT | Mod: PBBFAC,,, | Performed by: INTERNAL MEDICINE

## 2025-05-14 RX ORDER — RAMIPRIL 10 MG/1
10 CAPSULE ORAL DAILY
Qty: 90 CAPSULE | Refills: 3 | Status: SHIPPED | OUTPATIENT
Start: 2025-05-14

## 2025-05-14 RX ORDER — ASPIRIN 81 MG/1
81 TABLET ORAL DAILY
COMMUNITY
Start: 2025-05-14

## 2025-05-14 RX ORDER — EZETIMIBE 10 MG/1
10 TABLET ORAL DAILY
Qty: 90 TABLET | Refills: 3 | Status: SHIPPED | OUTPATIENT
Start: 2025-05-14

## 2025-05-14 RX ORDER — ROSUVASTATIN CALCIUM 40 MG/1
40 TABLET, COATED ORAL DAILY
Qty: 90 TABLET | Refills: 3 | Status: SHIPPED | OUTPATIENT
Start: 2025-05-14

## 2025-05-14 NOTE — PROGRESS NOTES
"Subjective:     Richie Johnson is a 69 y.o.male with hypertension, hypercholesterolemia and prediabetes. He is overweight. He has severe coronary artery disease and underwent coronary artery bypass grafting on 3/4/2013 receiving five grafts. He had some chest discomfort in 2016 and then had epigastric pain 6/2018. In 11/2019 he began experience epigastric discomfort and left arm pain. No clear exertional chest pain and no clear association with playing tennis. On 2/7/2020 he underwent a stress echocardiogram and was able 10:00 minutes on the treadmill. There was no chest pain. The electrocardiogram was "negative" with a right bundle branch block. The echocardiogram was "negative" as well with normal left ventricular size and systolic function. He may have had myalgia on statin in the past but appears to tolerate rosuvastatin well. In 2021 he had some neck pain. On 4/28/2025 he underwent surgery of his left shoulder. No exertional chest pain or exertional dyspnea. No palpitations or weak spells. No issues with any of his prescribed medications. Feeling well overall.      Coronary Artery Disease  Presents for follow-up visit. Pertinent negatives include no chest pain, chest pressure, chest tightness, dizziness, leg swelling, muscle weakness, palpitations, shortness of breath or weight gain. Risk factors include hyperlipidemia. Risk factors do not include hypertension or obesity. The symptoms have been stable.   Chest Pain   This is a chronic problem. The current episode started more than 1 month ago. The problem has been resolved. Associated symptoms include back pain. Pertinent negatives include no abdominal pain, claudication, cough, diaphoresis, dizziness, exertional chest pressure, fever, headaches, hemoptysis, irregular heartbeat, leg pain, lower extremity edema, malaise/fatigue, nausea, near-syncope, numbness, orthopnea, palpitations, PND, shortness of breath, sputum production, syncope, vomiting or weakness. "   His past medical history is significant for CAD and hyperlipidemia.   Pertinent negatives for past medical history include no diabetes, no hypertension and no muscle weakness.   Hypertension  The problem is unchanged. The problem is controlled (usually 120-130/60-75 mmHg at home). Associated symptoms include neck pain. Pertinent negatives include no anxiety, blurred vision, chest pain, headaches, malaise/fatigue, orthopnea, palpitations, PND, shortness of breath or sweats. There is no history of chronic renal disease.   Hyperlipidemia  This is a chronic problem. The current episode started more than 1 year ago. The problem is controlled. Recent lipid tests were reviewed and are normal. He has no history of chronic renal disease, diabetes, hypothyroidism, liver disease, obesity or nephrotic syndrome. Pertinent negatives include no chest pain, focal sensory loss, focal weakness, leg pain, myalgias or shortness of breath.       Review of Systems   Constitutional: Negative for diaphoresis, fever, malaise/fatigue and weight gain.   HENT:  Negative for nosebleeds.    Eyes:  Negative for blurred vision, double vision, pain, vision loss in left eye and vision loss in right eye.   Cardiovascular:  Negative for chest pain, claudication, dyspnea on exertion, irregular heartbeat, leg swelling, near-syncope, orthopnea, palpitations, paroxysmal nocturnal dyspnea and syncope.   Respiratory:  Negative for chest tightness, cough, hemoptysis, shortness of breath, sputum production and wheezing.    Endocrine: Negative for cold intolerance and heat intolerance.   Hematologic/Lymphatic: Negative for bleeding problem. Does not bruise/bleed easily.   Skin:  Negative for color change and itching.   Musculoskeletal:  Positive for back pain and neck pain. Negative for falls, muscle weakness and myalgias.   Gastrointestinal:  Negative for abdominal pain, flatus, heartburn, hematemesis, hematochezia, hemorrhoids, jaundice, melena, nausea and  "vomiting.   Genitourinary:  Negative for dysuria and hematuria.   Neurological:  Negative for dizziness, focal weakness, headaches, light-headedness, loss of balance, numbness, sensory change, tremors and weakness.   Psychiatric/Behavioral:  Negative for altered mental status, depression and memory loss. The patient is not nervous/anxious.    Allergic/Immunologic: Negative for hives and persistent infections.       Current Outpatient Medications on File Prior to Visit   Medication Sig Dispense Refill    aspirin (ECOTRIN) 81 MG EC tablet Take 1 tablet (81 mg total) by mouth once daily.      CALCIUM CARBONATE/VITAMIN D3 (VITAMIN D-3 ORAL) Take 2,000 Int'l Units by mouth once daily.      ezetimibe (ZETIA) 10 mg tablet Take 1 tablet (10 mg total) by mouth once daily. 90 tablet 3    FIBER-CAPS, PSYLLIUM HUSK, ORAL       multivitamin (THERAGRAN) per tablet Take 1 tablet by mouth once daily.      naproxen sodium (ANAPROX) 220 MG tablet Take 220 mg by mouth 2 (two) times daily with meals.      ramipriL (ALTACE) 10 MG capsule Take 1 capsule (10 mg total) by mouth once daily. 90 capsule 3    rosuvastatin (CRESTOR) 40 MG Tab Take 1 tablet (40 mg total) by mouth once daily. 90 tablet 3    triamcinolone acetonide 0.5% (KENALOG) 0.5 % Crea Apply topically 2 (two) times daily. 15 g 1    ubidecarenone (COQ-10 ORAL)       HYDROcodone-acetaminophen (NORCO)  mg per tablet Take 1 tablet by mouth every 6 (six) hours as needed for Pain. (Patient not taking: Reported on 5/14/2025) 30 tablet 0    ketoconazole (NIZORAL) 2 % shampoo Apply topically twice a week. (Patient not taking: Reported on 5/14/2025) 120 mL 0     No current facility-administered medications on file prior to visit.       /64   Pulse 66   Ht 6' 1" (1.854 m)   Wt 99.3 kg (219 lb 0.4 oz)   SpO2 96%   BMI 28.90 kg/m²     Objective:     Physical Exam  Constitutional:       General: He is not in acute distress.     Appearance: Normal appearance. He is " well-developed. He is not diaphoretic.   HENT:      Head: Normocephalic and atraumatic.   Eyes:      General: No scleral icterus.        Right eye: No discharge.         Left eye: No discharge.      Conjunctiva/sclera: Conjunctivae normal.   Neck:      Thyroid: No thyromegaly.      Vascular: No carotid bruit or JVD.   Cardiovascular:      Rate and Rhythm: Normal rate and regular rhythm.      Pulses:           Radial pulses are 2+ on the right side and 2+ on the left side.        Dorsalis pedis pulses are 2+ on the right side and 2+ on the left side.        Posterior tibial pulses are 2+ on the right side and 2+ on the left side.      Heart sounds: S1 normal and S2 normal. No murmur heard.     Gallop present. S4 sounds present. No S3 sounds.   Pulmonary:      Effort: Pulmonary effort is normal.      Breath sounds: Normal breath sounds.   Abdominal:      Palpations: Abdomen is soft.      Tenderness: There is no abdominal tenderness.   Musculoskeletal:      Cervical back: Neck supple.      Right ankle: No swelling, deformity or ecchymosis.      Left ankle: No swelling, deformity or ecchymosis.   Lymphadenopathy:      Head:      Right side of head: No submandibular adenopathy.      Left side of head: No submandibular adenopathy.      Cervical: No cervical adenopathy.   Skin:     General: Skin is warm and dry.      Findings: No rash.   Neurological:      General: No focal deficit present.      Mental Status: He is alert and oriented to person, place, and time.   Psychiatric:         Attention and Perception: Attention and perception normal.         Mood and Affect: Mood and affect normal.         Speech: Speech normal.         Behavior: Behavior normal.         Thought Content: Thought content normal.         Cognition and Memory: Cognition and memory normal.         Judgment: Judgment normal.         Assessment:     1. Coronary artery disease involving native coronary artery of native heart without angina pectoris    2.  "History of coronary artery bypass surgery    3. History of chest pain    4. Right bundle branch block    5. Primary hypertension    6. Hypercholesterolemia    7. Prediabetes    8. Overweight        Plan:      1. Coronary Artery Disease   2/28/2013: [facility]: Cath: 3V CAD.    3/4/2013: [facility]: CABG x 5: LIMA to LAD, SVG1 to D1, SVG2 to RI & OM1 and SVG3 to RCA.   2/7/2020: Stress Echo: 10:00 min. No CP. ECG "negative". RBBB. Echo: "negative". Normal left ventricular size and systolic function.   9/15/2023: Echo: Normal left ventricular size and systolic function. EF 65%. Moderate diastolic dysfunction. Moderately enlarged LA.    5/14/2025: A GLP1 RA would be beneficial. Discussed.   On aspirin 81 mg Q24.   Appears stable.     2. History of Chest Pain   8/15/2016: Experienced chest pain.   10/20/2016: Echo: Normal left ventricular size and systolic function. Mild LVH. Moderate diastolic dysfunction. Mildly dilated LA.   10/20/2016: Regadenoson MPI: Small moderately dense fixed inferoapical defect.   10/26/2018: Echo: Normal left ventricular size and systolic function. Mild LVH. Mildly dilated LA.   6/2018: Episode of epigastric pain.   11/2019: Recurrent pain.   Appeared atypical.   Resolved.     3. Right Bundle Branch Block   2/6/2020: NSR. RBBB.   7/13/2023: NSR. RBBB.   9/22/2023: Holter: Sinus rhythm 68 () bpm. Normal MS interval. Widened QRS with RBBB morphology. No pauses. Rare APC's - <1/hr. Rare VPC's - <1/hr. No reported symptoms.     4. Hypertension   8/22/2018: 135/79 mmHg.   4/24/2019: 159/86 mmHg.   6/18/2020: 137/79 mmHg.   9/17/2020: 152/80 mmHg.   9/17/2020: Ramipril 5 mg Q24 was begun.    5/13/2021: Ramipril 5 mg Q24 was increased to ramipril 10 mg Q24.   On ramipril 10 mg Q24.   Keeping log at home.   Well controlled.     5. Hypercholesterolemia   5/16/2013: Chol 209. HDL 43. . .   Was on atorvastatin 20 mg Q24 - unable to tolerate 40 mg Q24.   On rosuvastatin 40 mg Q24 and ezetimide " 10 mg Q24.   12/10/2014: Chol 172. HDL 46. LDL 87. .   9/24/2015: Chol 148. HDL 50. LDL 78. TG 99.   9/20/2018: Chol 124. HDL 48. LDL 65. TG 54.   3/30/2022: Chol 134. HDL 51. LDL 73. TG 50.   6/7/2023: Chol 151. HDL 50. LDL 84. TG 85.   8/28/2024: Chol 132. HDL 42. LDL 70. .   3/26/2025: Chol 123. HDL 39. LDL 65. TG 96.   On rosuvastatin 40 mg Q24 and ezetimibe 10 mg Q24.   Very favorable lipid panel.   5/14/2025: Do Lp(a).    6. Prediabetes   3/26/2025: HgbA1C 6.0%.   Discussed weight, exercise and diet.     7. Overweight   8/22/2018: Weight 96 kg. BMI 28.   11/30/2023: Weight 102 kg. BMI 30.   11/20/2024: Weight 99 kg. BMI 29.   Encouraged to lose weight.    8. Back Pain   2012: Back Surgery.    9. Primary Care   Dr. Patricia Barrett.    F/u 6 months.    Tiffanie Hawk M.D.

## 2025-05-15 ENCOUNTER — OFFICE VISIT (OUTPATIENT)
Dept: OTOLARYNGOLOGY | Facility: CLINIC | Age: 70
End: 2025-05-15
Payer: MEDICARE

## 2025-05-15 ENCOUNTER — CLINICAL SUPPORT (OUTPATIENT)
Facility: CLINIC | Age: 70
End: 2025-05-15
Payer: MEDICARE

## 2025-05-15 VITALS
TEMPERATURE: 98 F | HEIGHT: 73 IN | DIASTOLIC BLOOD PRESSURE: 71 MMHG | BODY MASS INDEX: 28.92 KG/M2 | HEART RATE: 67 BPM | SYSTOLIC BLOOD PRESSURE: 124 MMHG | WEIGHT: 218.19 LBS

## 2025-05-15 DIAGNOSIS — H90.3 SENSORINEURAL HEARING LOSS (SNHL) OF BOTH EARS: ICD-10-CM

## 2025-05-15 DIAGNOSIS — H90.3 BILATERAL SENSORINEURAL HEARING LOSS: Primary | ICD-10-CM

## 2025-05-15 LAB
OHS QRS DURATION: 148 MS
OHS QTC CALCULATION: 454 MS

## 2025-05-15 PROCEDURE — 92557 COMPREHENSIVE HEARING TEST: CPT | Mod: S$GLB,,, | Performed by: AUDIOLOGIST-HEARING AID FITTER

## 2025-05-15 PROCEDURE — 1101F PT FALLS ASSESS-DOCD LE1/YR: CPT | Mod: CPTII,S$GLB,, | Performed by: STUDENT IN AN ORGANIZED HEALTH CARE EDUCATION/TRAINING PROGRAM

## 2025-05-15 PROCEDURE — 1159F MED LIST DOCD IN RCRD: CPT | Mod: CPTII,S$GLB,, | Performed by: STUDENT IN AN ORGANIZED HEALTH CARE EDUCATION/TRAINING PROGRAM

## 2025-05-15 PROCEDURE — 3078F DIAST BP <80 MM HG: CPT | Mod: CPTII,S$GLB,, | Performed by: STUDENT IN AN ORGANIZED HEALTH CARE EDUCATION/TRAINING PROGRAM

## 2025-05-15 PROCEDURE — 3044F HG A1C LEVEL LT 7.0%: CPT | Mod: CPTII,S$GLB,, | Performed by: STUDENT IN AN ORGANIZED HEALTH CARE EDUCATION/TRAINING PROGRAM

## 2025-05-15 PROCEDURE — 3288F FALL RISK ASSESSMENT DOCD: CPT | Mod: CPTII,S$GLB,, | Performed by: STUDENT IN AN ORGANIZED HEALTH CARE EDUCATION/TRAINING PROGRAM

## 2025-05-15 PROCEDURE — 92567 TYMPANOMETRY: CPT | Mod: S$GLB,,, | Performed by: AUDIOLOGIST-HEARING AID FITTER

## 2025-05-15 PROCEDURE — 3074F SYST BP LT 130 MM HG: CPT | Mod: CPTII,S$GLB,, | Performed by: STUDENT IN AN ORGANIZED HEALTH CARE EDUCATION/TRAINING PROGRAM

## 2025-05-15 PROCEDURE — 99203 OFFICE O/P NEW LOW 30 MIN: CPT | Mod: S$GLB,,, | Performed by: STUDENT IN AN ORGANIZED HEALTH CARE EDUCATION/TRAINING PROGRAM

## 2025-05-15 PROCEDURE — 1126F AMNT PAIN NOTED NONE PRSNT: CPT | Mod: CPTII,S$GLB,, | Performed by: STUDENT IN AN ORGANIZED HEALTH CARE EDUCATION/TRAINING PROGRAM

## 2025-05-15 PROCEDURE — 4010F ACE/ARB THERAPY RXD/TAKEN: CPT | Mod: CPTII,S$GLB,, | Performed by: STUDENT IN AN ORGANIZED HEALTH CARE EDUCATION/TRAINING PROGRAM

## 2025-05-15 PROCEDURE — 3008F BODY MASS INDEX DOCD: CPT | Mod: CPTII,S$GLB,, | Performed by: STUDENT IN AN ORGANIZED HEALTH CARE EDUCATION/TRAINING PROGRAM

## 2025-05-15 NOTE — PROGRESS NOTES
Ear, Nose, & Throat  Otolaryngology - Head & Neck Surgery      Subjective:     Chief Complaint:   Chief Complaint   Patient presents with    Hearing Loss     GO OVER AUDIO       Richie Johnson is a 69 y.o. male who was referred to me by Dr. Minh Means in consultation for chronic, progressive hearing loss. He was referred after failing a hearing screen with PCP.    Associated symptoms include none. He denies any non-pulsatile tinnitus, vertigo, aural fullness, otalgia, or otorrhea.     Otologic history is significant for none    He does not wear hearing aids and is not interested.     He is not using ototoxic medications.     Risk factors for hearing loss include .      Past Medical History  Active Ambulatory Problems     Diagnosis Date Noted    Coronary artery disease     History of coronary artery bypass surgery 04/02/2013    Hypercholesterolemia 04/02/2013    Back ache 04/02/2013    History of chest pain 08/30/2016    Right bundle branch block 08/30/2016    Overweight 08/22/2018    Hypertension 09/17/2020    Complete rotator cuff tear or rupture of left shoulder, not specified as traumatic 10/29/2021    Prediabetes 06/07/2023     Resolved Ambulatory Problems     Diagnosis Date Noted    Acute medial meniscal tear 11/04/2016    Coronary artery disease involving native coronary artery of native heart with angina pectoris 06/07/2023     Past Medical History:   Diagnosis Date    Coronary atherosclerosis of native coronary artery 04/02/2013    Hx of CABG 04/02/2013    Left bundle branch block 08/30/2016    Wears prescription eyeglasses        Past Surgical History  He has a past surgical history that includes Back surgery (2012); Cardiac surgery (2013); Colonoscopy; removal of growth on upper lip; knee arthoscopy; Shoulder arthroscopy (Right, 10/29/2021); Rotator cuff repair (Right, 10/29/2021); Coronary artery bypass graft; Spine surgery; Foot surgery (Left, 2024); Rotator cuff repair (Left, 4/25/2025);  Arthroscopic debridement of shoulder (Left, 4/25/2025); and Arthroscopy of shoulder with decompression of subacromial space (Left, 4/25/2025).    Past Surgical History:   Procedure Laterality Date    ARTHROSCOPIC DEBRIDEMENT OF SHOULDER Left 4/25/2025    Procedure: ARTHROSCOPY, SHOULDER, W/ DEBRIDEMENT;  Surgeon: Saul Gonzalez MD;  Location: [facility];  Service: Orthopedics;  Laterality: Left;    ARTHROSCOPY OF SHOULDER WITH DECOMPRESSION OF SUBACROMIAL SPACE Left 4/25/2025    Procedure: ARTHROSCOPY, SHOULDER, W/ SUBACROMIAL DECOMPRESSION;  Surgeon: Saul Gonzalez MD;  Location: [facility];  Service: Orthopedics;  Laterality: Left;    BACK SURGERY  2012    CARDIAC SURGERY  2013    CABG    COLONOSCOPY      CORONARY ARTERY BYPASS GRAFT      FOOT SURGERY Left 2024    tendon reattached    knee arthoscopy      left    removal of growth on upper lip      ROTATOR CUFF REPAIR Right 10/29/2021    Procedure: REPAIR, ROTATOR CUFF;  Surgeon: Saul Gonzalez MD;  Location: [facility];  Service: Orthopedics;  Laterality: Right;    ROTATOR CUFF REPAIR Left 4/25/2025    Procedure: REPAIR, ROTATOR CUFF;  Surgeon: Saul Gonzalez MD;  Location: [facility];  Service: Orthopedics;  Laterality: Left;    SHOULDER ARTHROSCOPY Right 10/29/2021    Procedure: ARTHROSCOPY, SHOULDER WITH MINI OPEN;  Surgeon: Saul Gonzalez MD;  Location: [facility];  Service: Orthopedics;  Laterality: Right;    SPINE SURGERY          Family History  His family history includes Asthma in his sister; Heart disease in his father; Hypertension in his father; No Known Problems in his mother; Throat cancer in his paternal grandfather.    Social History  He reports that he quit smoking about 27 years ago. His smoking use included cigarettes. He started smoking about 42 years ago. He has a 22.5 pack-year smoking history. He has never been exposed to tobacco smoke. He has never used smokeless tobacco. He reports current alcohol use of about 7.0 standard drinks  "of alcohol per week. He reports that he does not use drugs.    Allergies  He has no known allergies.    Medications  He has a current medication list which includes the following prescription(s): aspirin, calcium carbonate/vitamin d3, ezetimibe, psyllium husk, hydrocodone-acetaminophen, ketoconazole, multivitamin, naproxen sodium, ramipril, rosuvastatin, triamcinolone acetonide 0.5%, and ubidecarenone.    ROS:  Pertinent positive and negative review of systems as noted in HPI.      Objective:     /71 (BP Location: Left arm, Patient Position: Sitting)   Pulse 67   Temp 97.7 °F (36.5 °C) (Temporal)   Ht 6' 1" (1.854 m)   Wt 99 kg (218 lb 3.2 oz)   BMI 28.79 kg/m²    Constitutional: Well appearing, communicating. No acute distress  Right Ear:    Auricle normally developed   EAC: normal   Tympanic membrane: intact   Middle Ear: No effusion present and Ossicles in normal position  Left Ear:    Auricle normally developed   EAC: normal   Tympanic membrane: intact   Middle Ear: No effusion present and Ossicles in normal position  Neuro/Psychiatric:     Affect: Appropriate   Eyes: EOMI intact  Respiratory: No increased WOB, no stridor       Data Review:   MEDICAL RECORDS    I have reviewed the following medical records relevant to the care of this patient from unique sources outside of my institution or departmental specialty:  Primary Care    LABS    I have independently reviewed the lab results shown above. Findings significant for the conditions being treated include: none    IMAGING    No pertinent imaging available    AUDIO    I have independently reviewed the following audiogram and reviewed the report. Findings as follows:     Pure Tone Audiometry: Symmetric  Right - Normal (0-25 dB) to Mild (26-40 dB) high frequency sensorineural hearing loss  Left - Normal (0-25 dB) to Moderate (41-55 dB) high frequency sensorineural hearing loss    Tympanometry  Right: Type A  Left: Type A    Word Discrimination " Score  Right: 100%  Left 100%    Acoustic Reflexes  Right Stimulation - Right reflex: DNT   Left Reflex: DNT  Left Stimulation - Right reflex: DNT  Left Reflex: DNT    Procedures:       Assessment:     1. Sensorineural hearing loss (SNHL) of both ears      Plan:     I had a long discussion with the patient regarding his condition and the further workup and management options.  Not a candidate for hearing aids at this time. Repeat audiogram in 1 year.     Voice recognition software was used in the creation of this note/communication and any sound-alike errors which may have occurred from its use should be taken in context when interpreting. If such errors prevent a clear understanding of the note/communication, please contact the office for clarification.   Problem List Items Addressed This Visit    None  Visit Diagnoses         Sensorineural hearing loss (SNHL) of both ears

## 2025-07-31 ENCOUNTER — OFFICE VISIT (OUTPATIENT)
Dept: DERMATOLOGY | Facility: CLINIC | Age: 70
End: 2025-07-31
Payer: MEDICARE

## 2025-07-31 DIAGNOSIS — Z12.83 SCREENING EXAM FOR SKIN CANCER: ICD-10-CM

## 2025-07-31 DIAGNOSIS — L82.1 SEBORRHEIC KERATOSES: ICD-10-CM

## 2025-07-31 DIAGNOSIS — D23.9 DERMATOFIBROMA: ICD-10-CM

## 2025-07-31 DIAGNOSIS — D22.9 MULTIPLE BENIGN NEVI: ICD-10-CM

## 2025-07-31 DIAGNOSIS — L92.0 GRANULOMA ANNULARE: ICD-10-CM

## 2025-07-31 DIAGNOSIS — L73.9 FOLLICULITIS: ICD-10-CM

## 2025-07-31 DIAGNOSIS — L81.4 LENTIGO: ICD-10-CM

## 2025-07-31 DIAGNOSIS — D18.01 CHERRY ANGIOMA: ICD-10-CM

## 2025-07-31 DIAGNOSIS — L57.0 ACTINIC KERATOSIS: Primary | ICD-10-CM

## 2025-07-31 PROCEDURE — 99999 PR PBB SHADOW E&M-EST. PATIENT-LVL III: CPT | Mod: PBBFAC,,, | Performed by: STUDENT IN AN ORGANIZED HEALTH CARE EDUCATION/TRAINING PROGRAM

## 2025-07-31 NOTE — PROGRESS NOTES
Subjective:      Patient ID:  Richie Johnson is a 70 y.o. male who presents for   Chief Complaint   Patient presents with    Skin Check     Patient here for Total Body Skin Exam    Last seen by dermatologist: never    no - personal history of atypical moles removed  no - personal history of MM   no - family history of MM  yes - childhood blistering sunburns  nono - tanning bed use  no - personal history of NMSC    Patient with specific complaint of lesion(s)  Location: arms and chest  Duration: 5 years   Symptoms: red blotches   Relieving factors/Previous treatments: none               Review of Systems   Constitutional:  Negative for fever and chills.   Respiratory:  Negative for cough and shortness of breath.    Gastrointestinal:  Negative for nausea and vomiting.   Musculoskeletal:  Negative for joint swelling and arthralgias.   Skin:  Positive for activity-related sunscreen use. Negative for daily sunscreen use, recent sunburn and wears hat.   All other systems reviewed and are negative.  Hematologic/Lymphatic: Does not bruise/bleed easily.       Objective:   Physical Exam   Constitutional: He appears well-developed and well-nourished. No distress.   Neurological: He is alert and oriented to person, place, and time. He is not disoriented.   Psychiatric: He has a normal mood and affect.   Skin:   Areas Examined (abnormalities noted in diagram):   Scalp / Hair Palpated and Inspected  Head / Face Inspection Performed  Neck Inspection Performed  Chest / Axilla Inspection Performed  Abdomen Inspection Performed  Back Inspection Performed  RUE Inspected  LUE Inspection Performed  RLE Inspected  LLE Inspection Performed  Nails and Digits Inspection Performed                 Diagram Legend     Erythematous scaling macule/papule c/w actinic keratosis       Vascular papule c/w angioma      Pigmented verrucoid papule/plaque c/w seborrheic keratosis      Yellow umbilicated papule c/w sebaceous hyperplasia       Irregularly shaped tan macule c/w lentigo     1-2 mm smooth white papules consistent with Milia      Movable subcutaneous cyst with punctum c/w epidermal inclusion cyst      Subcutaneous movable cyst c/w pilar cyst      Firm pink to brown papule c/w dermatofibroma      Pedunculated fleshy papule(s) c/w skin tag(s)      Evenly pigmented macule c/w junctional nevus     Mildly variegated pigmented, slightly irregular-bordered macule c/w mildly atypical nevus      Flesh colored to evenly pigmented papule c/w intradermal nevus       Pink pearly papule/plaque c/w basal cell carcinoma      Erythematous hyperkeratotic cursted plaque c/w SCC      Surgical scar with no sign of skin cancer recurrence      Open and closed comedones      Inflammatory papules and pustules      Verrucoid papule consistent consistent with wart     Erythematous eczematous patches and plaques     Dystrophic onycholytic nail with subungual debris c/w onychomycosis     Umbilicated papule    Erythematous-base heme-crusted tan verrucoid plaque consistent with inflamed seborrheic keratosis     Erythematous Silvery Scaling Plaque c/w Psoriasis     See annotation      Assessment / Plan:        Actinic keratosis  Cryosurgery Procedure Note    Verbal consent from the patient is obtained including, but not limited to, risk of hypopigmentation/hyperpigmentation, scar, recurrence of lesion. The patient is aware of the precancerous quality and need for treatment of these lesions. Liquid nitrogen cryosurgery is applied to the 3 actinic keratoses, as detailed in the physical exam, to produce a freeze injury. The patient is aware that blisters may form and is instructed on wound care with gentle cleansing and use of vaseline ointment to keep moist until healed. The patient is supplied a handout on cryosurgery and is instructed to call if lesions do not completely resolve.    Folliculitis--scalp   Not bothersome so no treatment needed. Currently using keto  shampoo--can continue    Seborrheic keratoses  Multiple benign nevi  Lentigo  Cherry angioma  Dermatofibroma   - minor problem and chronic.   Reassurance given to patient. No treatment necessary.     Screening exam for skin cancer  Total body skin examination performed today including at least 12 points as noted in physical examination. No lesions suspicious for malignancy noted.    Recommend daily sun protection/avoidance, use of at least SPF 30, broad spectrum sunscreen (OTC drug), skin self examinations, and routine physician surveillance to optimize early detection    Granuloma annulare--arms  Reassurance given to patient   Not bothersome so no treatment needed           Follow up in about 1 year (around 7/31/2026) for TBSE.

## 2025-07-31 NOTE — PATIENT INSTRUCTIONS
CRYOSURGERY      Your doctor has used a method called cryosurgery to treat your skin condition. Cryosurgery refers to the use of very cold substances to treat a variety of skin conditions such as warts, pre-skin cancers, molluscum contagiosum, sun spots, and several benign growths. The substance we use in cryosurgery is liquid nitrogen and is so cold (-195 degrees Celsius) that is burns when administered.     Following treatment in the office, the skin may immediately burn and become red. You may find the area around the lesion is affected as well. It is sometimes necessary to treat not only the lesion, but a small area of the surrounding normal skin to achieve a good response.     A blister, and even a blood filled blister, may form after treatment.   This is a normal response. If the blister is painful, it is acceptable to sterilize a needle and with rubbing alcohol and gently pop the blister. It is important that you gently wash the area with soap and warm water as the blister fluid may contain wart virus if a wart was treated. Do no remove the roof of the blister.     The area treated can take anywhere from 1-3 weeks to heal. Healing time depends on the kind skin lesion treated, the location, and how aggressively the lesion was treated. It is recommended that the areas treated are covered with Vaseline or bacitracin ointment and a band-aid. If a band-aid is not practical, just ointment applied several times per day will do. Keeping these areas moist will speed the healing time.    Treatment with liquid nitrogen can leave a scar. In dark skin, it may be a light or dark scar, in light skin it may be a white or pink scar. These will generally fade with time, but may never go away completely.     If you have any concerns after your treatment, please feel free to call the office.       [address] [place] [address]/ [phone] FAX/ [email or web address]       Sunscreen Guidelines  Sunscreen  protects your skin by absorbing and reflecting ultraviolet rays. All sunscreens have a sun protection factor (SPF) rating that indicates how long a sunscreen will remain effective on the skin.    Why protect your skin?  The sun's rays are composed of many different wavelengths, including UVA, UVB, and visible light that each affect the skin differently.    UVB: sunburn, photoaging, skin cancer (melanoma, basal cell, and squamous cell carcinomas) and modulation of the skin's immune system.    UVA: similar to above but thought to contribute more to aging; at the same dose of UVB it is less powerful however the sun has 10-20 times the levels of UVA as compared with UVB.  Visible light: implicated in causing unwanted darkening of skin, such as melasma and post-inflammatory hyperpigmentation in darker skin types     If I have dark skin, do I need to worry about the sun?    More darkly pigmented skin is more protected against UV-induced skin cancer, sunburn, and photoaging, though may still suffer from sun-related conditions, including melasma, hyperpigmentation, and other dark spots.    Sun avoidance  As a general rule, stay out of the sun as much as possible between 10 a.m. - 4 p.m.    Download the EPA UV index heaven to track the UV index by hour in your zip code.      Which sunscreen should I choose?  The best sunscreen to use varies by individual. The one that feels best on your skin and fits your lifestyle will be the one you will likely use most regularly.   Active ingredients of sunscreen vary by , and may be a chemical (such as avobenzone or oxybenzone) or physical agent (such as zinc oxide or titanium dioxide). I recommend a physical agent.  A water-resistant sunscreen is one that maintains the SPF level after 40 minutes of water exposure. A very water-resistant sunscreen maintains the SPF level after 80 minutes of water exposure.    Sunscreen: this is the last layer in sun protection   Be generous: 1  "shot glass of sunscreen for your body, ½ teaspoon for your face/neck  Reapply every 2 hours  Broad spectrum (provides UVA/UVB protection), SPF 50 or above  Avoid spray sunscreens: less effective and have been found to contain benzene (carcinogen)    Sun protective clothing  Although sunscreen helps minimize sun damage, no sunscreen completely blocks all wavelengths of UV light. Wearing sun protective clothing such as hats, rashguards or swim shirts, and long sleeves and/or pants, as well as avoiding sun exposure from 10 a.m. to 4 p.m. will help protect your skin from overexposure and minimize sun damage. Seek shade.  Long sleeved clothing, hats, and sunglasses: makes sun protection easier, more effective, and can even be more affordable, since sunscreen needs to be reapplied frequently.    Solumbra (www.sunprectautions.[employer])  UniversityNow (www.[employer])  Mipsoibar (www.app2you)  Land's end (www.beStylish.com)  Hats from Beatrice Sonia (www.helenkaminski.com)    My Favorite Sunscreens:  Physical blockers: Can have a "white case" but in general are more effective  - Face: CeraVe tinted mineral sunscreen, Bare Minerals complexion rescue (20 shades), Elta MD (UV elements, UV physical, UV restore, etc), Tizo ultra zinc tinted, Cetaphil Sheer Mineral Face Liquid Sunscreen  - Body: Blue Lizard, Neutrogena Sheer Zinc, Eucerin Daily Protection, Aveeno Baby   "

## (undated) DEVICE — SYR 30CC LUER LOCK

## (undated) DEVICE — PENCIL ELECTROSURG HOLST W/BLD

## (undated) DEVICE — SUT PDS VIL/BLU DUAL ORTHO

## (undated) DEVICE — CANNULA SHOULDER 10/BOX

## (undated) DEVICE — TIP YANKAUERS BULB NO VENT

## (undated) DEVICE — PROBE ARTHO ENERGY 90 DEG

## (undated) DEVICE — BLADE SHAVER 4.5 6/BX

## (undated) DEVICE — GLOVE BIOGEL SKINSENSE PI 6.5

## (undated) DEVICE — BLADE SHAVER LANZA 4.2X13CM

## (undated) DEVICE — TUBING 4-LEAD ARTHOSCOPY

## (undated) DEVICE — BLADE GATOR 4.2

## (undated) DEVICE — BLADE VORTEX SHAVER 4.5MMX13CM

## (undated) DEVICE — SOL NACL IRR 3000ML

## (undated) DEVICE — GLOVE BIOGEL SKINSENSE PI 8.5

## (undated) DEVICE — SUT MCRYL PLUS 3-0 PS2 27IN

## (undated) DEVICE — TAPE MEDIPORE 3 X 10YD

## (undated) DEVICE — CLOSURE SKIN STERI STRIP 1/2X4

## (undated) DEVICE — UNDERGLOVE BIOGEL PI SZ 6.5 LF

## (undated) DEVICE — NDL SPECTRUM AUTOPASS

## (undated) DEVICE — SOL IRR NACL .9% 3000ML

## (undated) DEVICE — BLADE SURG STAINLESS STEEL #15

## (undated) DEVICE — APPLICATOR CHLORAPREP ORN 26ML

## (undated) DEVICE — ELECTRODE REM PLYHSV RETURN 9

## (undated) DEVICE — SOL IRR SOD CHL .9% POUR

## (undated) DEVICE — GAUZE SPONGE 4X4 12PLY

## (undated) DEVICE — SUT VICRYL 0 27 CT-2

## (undated) DEVICE — DRESSING N ADH OIL EMUL 3X3

## (undated) DEVICE — SET IRR URLGY 2LINE UNIV SPIKE

## (undated) DEVICE — SPONGE COTTON TRAY 4X4IN

## (undated) DEVICE — DRESSING N ADH OIL EMUL 3X8

## (undated) DEVICE — DRAPE SHOULDER BEACH CHAIR

## (undated) DEVICE — Device

## (undated) DEVICE — BLANKET HYPER ADULT 24X60IN

## (undated) DEVICE — SLING ARM LARGE FOAM STRAP

## (undated) DEVICE — SYR 10CC LUER LOCK

## (undated) DEVICE — NDL MAYO CAT 1/2 CIR #6

## (undated) DEVICE — KIT ASSISTARM SH KN STRL

## (undated) DEVICE — DRAPE STERI U-SHAPED 47X51IN

## (undated) DEVICE — UNDERGLOVES BIOGEL PI SZ 7 LF

## (undated) DEVICE — TAPE SURG MEDIPORE 6X72IN

## (undated) DEVICE — PAD ABDOMINAL STERILE 8X10IN

## (undated) DEVICE — SUT MONOCRYL 3-0 PS-2 UND

## (undated) DEVICE — SOL SALINE STER BOTTLE 500ML

## (undated) DEVICE — SEE MEDLINE ITEM 157160

## (undated) DEVICE — PAD ABD 8X10 STERILE